# Patient Record
Sex: FEMALE | Race: BLACK OR AFRICAN AMERICAN | Employment: FULL TIME | URBAN - METROPOLITAN AREA
[De-identification: names, ages, dates, MRNs, and addresses within clinical notes are randomized per-mention and may not be internally consistent; named-entity substitution may affect disease eponyms.]

---

## 2017-01-16 ENCOUNTER — OFFICE VISIT (OUTPATIENT)
Dept: INTERNAL MEDICINE CLINIC | Age: 32
End: 2017-01-16

## 2017-01-16 VITALS
HEIGHT: 64 IN | SYSTOLIC BLOOD PRESSURE: 113 MMHG | WEIGHT: 175 LBS | BODY MASS INDEX: 29.88 KG/M2 | HEART RATE: 85 BPM | OXYGEN SATURATION: 97 % | DIASTOLIC BLOOD PRESSURE: 76 MMHG | RESPIRATION RATE: 14 BRPM | TEMPERATURE: 96.9 F

## 2017-01-16 DIAGNOSIS — J30.89 SEASONAL ALLERGIC RHINITIS DUE TO FUNGAL SPORES: ICD-10-CM

## 2017-01-16 DIAGNOSIS — Z23 ENCOUNTER FOR IMMUNIZATION: ICD-10-CM

## 2017-01-16 DIAGNOSIS — I10 ESSENTIAL HYPERTENSION: Primary | ICD-10-CM

## 2017-01-16 DIAGNOSIS — J30.1 SEASONAL ALLERGIC RHINITIS DUE TO POLLEN: ICD-10-CM

## 2017-01-16 RX ORDER — CHOLECALCIFEROL (VITAMIN D3) 125 MCG
1000 TABLET ORAL DAILY
COMMUNITY
Start: 2017-01-16 | End: 2017-09-05 | Stop reason: ALTCHOICE

## 2017-01-16 RX ORDER — FLUTICASONE PROPIONATE 50 MCG
2 SPRAY, SUSPENSION (ML) NASAL DAILY
Qty: 1 BOTTLE | Refills: 5 | Status: SHIPPED | OUTPATIENT
Start: 2017-01-16 | End: 2017-01-16

## 2017-01-16 RX ORDER — FLUTICASONE PROPIONATE 50 MCG
2 SPRAY, SUSPENSION (ML) NASAL DAILY
Qty: 1 BOTTLE | Refills: 5 | Status: SHIPPED | OUTPATIENT
Start: 2017-01-16

## 2017-01-16 RX ORDER — LEVOCETIRIZINE DIHYDROCHLORIDE 5 MG/1
5 TABLET, FILM COATED ORAL DAILY
Qty: 30 TAB | Refills: 5 | Status: SHIPPED | OUTPATIENT
Start: 2017-01-16

## 2017-01-16 NOTE — MR AVS SNAPSHOT
Visit Information Date & Time Provider Department Dept. Phone Encounter #  
 1/16/2017  3:15  [facility] [place], [address] [facility] [phone] [id number] Follow-up Instructions Return in about 4 months (around 5/16/2017) for recheck allergies f/up htn 15 min . Your Appointments 6/7/2017 10:15 AM  
ESTABLISHED PATIENT with Kalyn Bloom MD  
[facility] at [facility]) Appt Note: 6 mo f/up [address] [place] [address]  
[phone] [address] Upcoming Health Maintenance Date Due  
 PAP AKA CERVICAL CYTOLOGY 9/24/2007 INFLUENZA AGE 9 TO ADULT 8/1/2016 DTaP/Tdap/Td series (3 - Td) 5/25/2026 Allergies as of 1/16/2017  Review Complete On: 1/16/2017 By: [address] [facility] [place], MD  
  
 Severity Noted Reaction Type Reactions Other Medication  01/24/2013   Side Effect Hives Allergic, to dust mites, molds Pollen Extracts  05/12/2015    Hives Current Immunizations  Reviewed on 5/31/2016 Name Date Influenza Vaccine 10/15/2015,  Deferred (Patient Refused) Influenza Vaccine Intradermal PF 1/16/2017 Pneumococcal Polysaccharide (PPSV-23) 10/15/2015 TDAP Vaccine 8/20/2011 Tdap 5/25/2016  9:21 PM  
  
 Not reviewed this visit You Were Diagnosed With   
  
 Codes Comments Essential hypertension    -  Primary ICD-10-CM: I10 
ICD-9-CM: 401.9 Vitamin D deficiency     ICD-10-CM: E55.9 ICD-9-CM: 268.9 Encounter for immunization     ICD-10-CM: R36 ICD-9-CM: V03.89 Seasonal allergic rhinitis due to pollen     ICD-10-CM: J30.1 ICD-9-CM: 477.0 Seasonal allergic rhinitis due to fungal spores     ICD-10-CM: J30.89 ICD-9-CM: 477.8 Cardiomyopathy, peripartum, postpartum     ICD-10-CM: O90.3 ICD-9-CM: 674.54 Vitals BP Pulse Temp Resp Height(growth percentile) Weight(growth percentile) 113/76 (BP 1 Location: Left arm, BP Patient Position: At rest) 85 96.9 °F (36.1 °C) (Oral) 14 5' 4\" (1.626 m) 175 lb (79.4 kg) LMP SpO2 BMI OB Status Smoking Status 01/10/2017 97% 30.04 kg/m2 Having regular periods Never Smoker BMI and BSA Data Body Mass Index Body Surface Area 30.04 kg/m 2 1.89 m 2 Preferred Pharmacy Pharmacy Name Phone [facility] [address], [address] [facility] [phone] Your Updated Medication List  
  
   
This list is accurate as of: 17  4:02 PM.  Always use your most recent med list.  
  
  
  
  
 carvedilol 25 mg tablet Commonly known as:  COREG  
two (2) times a day. DIGOX 0.125 mg tablet Generic drug:  digoxin  
daily. ergocalciferol (vitamin D2) 2,000 unit Tab Take 1,000 Units by mouth daily. fluticasone 50 mcg/actuation nasal spray Commonly known as:  Lindalee Goodview 2 Sprays by Both Nostrils route daily. levocetirizine 5 mg tablet Commonly known as:  Delories Isis Take 1 Tab by mouth daily. lisinopril 30 mg tablet Commonly known as:  PRINIVIL, ZESTRIL  
daily. MICROGESTIN 1.5/30 (21) 1.5-30 mg-mcg Tab Generic drug:  norethindrone ac-eth estradiol Take  by mouth daily. Prescriptions Sent to Pharmacy Refills  
 fluticasone (FLONASE) 50 mcg/actuation nasal spray 5 Si Sprays by Both Nostrils route daily. Class: Normal  
 Pharmacy: [facility] [place], [place] [address] RD AT [address] [facility] Ph #: [phone] Route: Both Nostrils  
 levocetirizine (XYZAL) 5 mg tablet 5 Sig: Take 1 Tab by mouth daily. Class: Normal  
 Pharmacy: [facility], [address] [place], [place] [address]E RD AT [address] [facility] Ph #: [phone] Route: Oral  
  
We Performed the Following DIGOXIN [11568 CPT(R)] IMM ADMIN Amaury Packerrini [15818 CPT(R)] METABOLIC PANEL, BASIC [72463 CPT(R)] Follow-up Instructions Return in about 4 months (around 5/16/2017) for recheck allergies f/up htn 15 min . Patient Instructions Call with any concerns Introducing [facility] & HEALTH SERVICES! Kaylacaesar Zavala introduces devsisters patient portal. Now you can access parts of your medical record, email your doctor's office, and request medication refills online. 1. In your internet browser, go to [email or web address]://[email or web address]. [employer] 2. Click on the First Time User? Click Here link in the Sign In box. You will see the New Member Sign Up page. 3. Enter your devsisters Access Code exactly as it appears below. You will not need to use this code after youve completed the sign-up process. If you do not sign up before the expiration date, you must request a new code. · devsisters Access Code: [email or web address] Expires: 3/6/2017  9:45 AM 
 
4. Enter the last four digits of your Social Security Number (xxxx) and Date of Birth (mm/dd/yyyy) as indicated and click Submit. You will be taken to the next sign-up page. 5. Create a devsisters ID. This will be your devsisters login ID and cannot be changed, so think of one that is secure and easy to remember. 6. Create a devsisters password. You can change your password at any time. 7. Enter your Password Reset Question and Answer. This can be used at a later time if you forget your password. 8. Enter your e-mail address. You will receive e-mail notification when new information is available in [address] 9. Click Sign Up. You can now view and download portions of your medical record. 10. Click the Download Summary menu link to download a portable copy of your medical information. If you have questions, please visit the Frequently Asked Questions section of the devsisters website.  Remember, devsisters is NOT to be used for urgent needs. For medical emergencies, dial 911. Now available from your iPhone and Android! Please provide this summary of care documentation to your next provider. Your primary care clinician is listed as Mone Pimentel. If you have any questions after today's visit, please call [phone].

## 2017-01-16 NOTE — PROGRESS NOTES
1. Have you been to the ER, urgent care clinic since your last visit? Hospitalized since your last visit[facility]. 2. Have you seen or consulted any other health care providers outside of the [address] [place] [address] since your last visit? Include any pap smears or colon screening. No     Chief Complaint   Patient presents with    Hypertension     4 month follow up     Not fasting    Patient states would like a flu shot.

## 2017-01-16 NOTE — PROGRESS NOTES
Chief Complaint   Patient presents with    Hypertension     4 month follow up           Subjective:   Des Boyce 28 y.o.  female with a  past medical history reviewed see below. comes in today for f/up HTn using meds as directed and low na diet and minimal cv exercise   Denies any med side effects no cp no sob   ROS: c/o nasal sinus congestion denies fever or chills  otherwise feeling generally well. All other systems reviewed and are negative      Current Outpatient Prescriptions   Medication Sig Dispense Refill    levocetirizine (XYZAL) 5 mg tablet Take 1 Tab by mouth daily. 30 Tab 5    ergocalciferol, vitamin D2, 2,000 unit tab Take 1,000 Units by mouth daily.  fluticasone (FLONASE) 50 mcg/actuation nasal spray 2 Sprays by Both Nostrils route daily. 1 Bottle 5    carvedilol (COREG) 25 mg tablet two (2) times a day.  lisinopril (PRINIVIL, ZESTRIL) 30 mg tablet daily.  DIGOX 125 mcg tablet daily.  norethindrone ac-eth estradiol (MICROGESTIN .5, ,) 1.5-30 mg-mcg tab Take  by mouth daily.        Allergies   Allergen Reactions    Other Medication Hives     Allergic, to dust mites, molds    Pollen Extracts Hives     Past Medical History   Diagnosis Date    AICD (automatic cardioverter/defibrillator) present     Anemia NEC     Cardiomyopathy ([address]     Contact dermatitis and other eczema, due to unspecified cause      hives    Hypertension      Past Surgical History   Procedure Laterality Date    Hx gyn      Hx pacemaker      Hx gyn  13         Hx pacemaker  3/19/15     biotronic icd #[id number]     Family History   Problem Relation Age of Onset    Hypertension Mother     Hypertension Father     Diabetes Maternal Grandmother     Heart Disease Maternal Grandmother     Arthritis-osteo Maternal Grandfather     Asthma Brother     Arthritis-osteo Paternal Grandmother     Arthritis-osteo Paternal Grandfather      Social History   Substance Use Topics    Smoking status: Never Smoker    Smokeless tobacco: Never Used    Alcohol use No          Objective:     Visit Vitals    /76 (BP 1 Location: Left arm, BP Patient Position: At rest)    Pulse 85    Temp 96.9 °F (36.1 °C) (Oral)    Resp 14    Ht 5' 4\" (1.626 m)    Wt 175 lb (79.4 kg)    LMP 01/10/2017    SpO2 97%    BMI 30.04 kg/m2     Gen: NAD, pleasant  HEENT: normal appearing head, nares patent, PERRLA, EOMI, oropharynx no erythema, no cervical lymphadenopathy neck supple   Cardio: RRR nl S1S2 no murmur  Lungs CTAB no wheeze no rales no rhonchi  ABD Soft non tender non distended + bowel sounds  Extremities: full ROM X 4 no clubbing no cyanosis  Neuro: no gross focal deficits noted, alert and orientated X 3  Psych.: well groomed no outward signs of depression. Assessment/Plan:   Jimmie Ramirez was seen today for hypertension. Diagnoses and all orders for this visit:    Essential hypertension  -     DIGOXIN  -     METABOLIC PANEL, BASIC    Encounter for immunization  -     Cancel: Influenza virus vaccine (QUADRIVALENT PRES FREE SYRINGE) IM 3 years and older  -     Influenza virus vaccine (FLUZONE INTRADERMAL PF)    Seasonal allergic rhinitis due to pollen  -     Discontinue: fluticasone (FLONASE) 50 mcg/actuation nasal spray; 2 Sprays by Both Nostrils route daily. Seasonal allergic rhinitis due to fungal spores  -     levocetirizine (XYZAL) 5 mg tablet; Take 1 Tab by mouth daily. Cardiomyopathy, peripartum, postpartum    Other orders  -     fluticasone (FLONASE) 50 mcg/actuation nasal spray; 2 Sprays by Both Nostrils route daily. Follow-up Disposition:  Return in about 4 months (around 5/16/2017) for recheck allergies f/up htn 15 min . Donal Stoner avs printed and given to the pt. [place] gloveross encouraged bp;s excellent dash diet reviewed   The patient voiced understanding of the above. Medication side effects were reviewed with the patient. Call with any concerns.

## 2017-01-18 LAB
BUN SERPL-MCNC: 11 MG/DL (ref 6–20)
BUN/CREAT SERPL: 14 (ref 8–20)
CALCIUM SERPL-MCNC: 8.8 MG/DL (ref 8.7–10.2)
CHLORIDE SERPL-SCNC: 106 MMOL/L (ref 96–106)
CO2 SERPL-SCNC: 20 MMOL/L (ref 18–29)
CREAT SERPL-MCNC: 0.8 MG/DL (ref 0.57–1)
DIGOXIN SERPL-MCNC: 0.6 NG/ML (ref 0.5–0.9)
GLUCOSE SERPL-MCNC: 105 MG/DL (ref 65–99)
POTASSIUM SERPL-SCNC: 4.1 MMOL/L (ref 3.5–5.2)
SODIUM SERPL-SCNC: 142 MMOL/L (ref 134–144)

## 2017-06-07 ENCOUNTER — OFFICE VISIT (OUTPATIENT)
Dept: CARDIOLOGY CLINIC | Age: 32
End: 2017-06-07

## 2017-06-07 VITALS
BODY MASS INDEX: 30.97 KG/M2 | HEIGHT: 64 IN | HEART RATE: 89 BPM | WEIGHT: 181.4 LBS | DIASTOLIC BLOOD PRESSURE: 72 MMHG | SYSTOLIC BLOOD PRESSURE: 110 MMHG | OXYGEN SATURATION: 99 %

## 2017-06-07 DIAGNOSIS — I10 ESSENTIAL HYPERTENSION: Primary | ICD-10-CM

## 2017-06-07 DIAGNOSIS — Z95.810 ICD (IMPLANTABLE CARDIOVERTER-DEFIBRILLATOR) IN PLACE: ICD-10-CM

## 2017-06-07 RX ORDER — NORETHINDRONE ACETATE AND ETHINYL ESTRADIOL, AND FERROUS FUMARATE 1.5-30(21)
KIT ORAL
COMMUNITY
Start: 2017-05-31 | End: 2017-09-05 | Stop reason: ALTCHOICE

## 2017-06-07 NOTE — MR AVS SNAPSHOT
Visit Information Date & Time Provider Department Dept. Phone Encounter #  
 6/7/2017 10:15 AM Jovan Mai MD [facility] at [facility] [phone] Upcoming Health Maintenance Date Due  
 PAP AKA CERVICAL CYTOLOGY 9/24/2007 INFLUENZA AGE 9 TO ADULT 8/1/2017 DTaP/Tdap/Td series (3 - Td) 5/25/2026 Allergies as of 6/7/2017  Review Complete On: 6/7/2017 By: Sharan Pop Severity Noted Reaction Type Reactions Other Medication  01/24/2013   Side Effect Hives Allergic, to dust mites, molds Pollen Extracts  05/12/2015    Hives Current Immunizations  Reviewed on 5/31/2016 Name Date Influenza Vaccine 10/15/2015,  Deferred (Patient Refused) Influenza Vaccine Intradermal PF 1/16/2017 Pneumococcal Polysaccharide (PPSV-23) 10/15/2015 TDAP Vaccine 8/20/2011 Tdap 5/25/2016  9:21 PM  
  
 Not reviewed this visit You Were Diagnosed With   
  
 Codes Comments Essential hypertension    -  Primary ICD-10-CM: I10 
ICD-9-CM: 401.9 Cardiomyopathy, peripartum, postpartum     ICD-10-CM: O90.3 ICD-9-CM: 674.54   
 ICD (implantable cardioverter-defibrillator) in place     ICD-10-CM: Z95.810 ICD-9-CM: V45.02 Vitals BP Pulse Height(growth percentile) Weight(growth percentile) SpO2 BMI  
 110/72 89 5' 4\" (1.626 m) 181 lb 6.4 oz (82.3 kg) 99% 31.14 kg/m2 OB Status Smoking Status Having regular periods Never Smoker Vitals History BMI and BSA Data Body Mass Index Body Surface Area  
 31.14 kg/m 2 1.93 m 2 Preferred Pharmacy Pharmacy Name Phone [facility] [facility] [phone] Your Updated Medication List  
  
   
This list is accurate as of: 6/7/17 11:25 AM.  Always use your most recent med list.  
  
  
  
  
 carvedilol 25 mg tablet Commonly known as:  Patricia Stable  
 Take 12.5 mg by mouth two (2) times a day. ergocalciferol (vitamin D2) 2,000 unit Tab Take 1,000 Units by mouth daily. fluticasone 50 mcg/actuation nasal spray Commonly known as:  Mobley Mya 2 Sprays by Both Nostrils route daily. JUNEL FE 1.5/30 (28) 1.5 mg-30 mcg (21)/75 mg (7) Tab Generic drug:  norethindrone-ethinyl estradiol-iron  
  
 levocetirizine 5 mg tablet Commonly known as:  Gertrudis Round Take 1 Tab by mouth daily. MICROGESTIN 1.5/30 (21) 1.5-30 mg-mcg Tab Generic drug:  norethindrone ac-eth estradiol Take  by mouth daily. We Performed the Following AMB POC EKG ROUTINE W/ 12 LEADS, INTER & REP [76117 CPT(R)] To-Do List   
 09/07/2017 ECHO:  2D ECHO COMPLETE ADULT (TTE) W OR WO CONTR Patient Instructions   
-- We will repeat the echo in September 
-- For now, decrease your Coreg dose to 12.5 mg by mouth twice a day for two weeks -- call the office with your home blood pressure readings, if they are good: 
-- Then decrease it again to 6.25 mg by mouth twice a day for two weeks -- Call the office again with your home blood pressure readings,  
-- After that, if your blood pressure remains in a good range, you can discontinue it altogether Home Blood Pressure Test: About This Test 
What is it? A home blood pressure test allows you to keep track of your blood pressure at home. Blood pressure is a measure of the force of blood against the walls of your arteries. Blood pressure readings include two numbers, such as 130/80 (say \"130 over 80\"). The first number is the systolic pressure. The second number is the diastolic pressure. Why is this test done? You may do this test at home to: · Find out if you have high blood pressure. · Track your blood pressure if you have high blood pressure. · Track how well medicine is working to reduce high blood pressure.  
· Check how lifestyle changes, such as weight loss and exercise, are affecting blood pressure. How can you prepare for the test? 
· Do not use caffeine, tobacco, or medicines known to raise blood pressure (such as nasal decongestant sprays) for at least 30 minutes before taking your blood pressure. · Do not exercise for at least 30 minutes before taking your blood pressure. What happens before the test? 
Take your blood pressure while you feel comfortable and relaxed. Sit quietly with both feet on the floor for at least 5 minutes before the test. 
What happens during the test? 
· Sit with your arm slightly bent and resting on a table so that your upper arm is at the same level as your heart. · Roll up your sleeve or take off your shirt to expose your upper arm. · Wrap the blood pressure cuff around your upper arm so that the lower edge of the cuff is about 1 inch above the bend of your elbow. Proceed with the following steps depending on if you are using an automatic or manual pressure monitor. Automatic blood pressure monitors · Press the on/off button on the automatic monitor and wait until the ready-to-measure \"heart\" symbol appears next to zero in the display window. · Press the start button. The cuff will inflate and deflate by itself. · Your blood pressure numbers will appear on the screen. · Write your numbers in your log book, along with the date and time. Manual blood pressure monitors · Place the earpieces of a stethoscope in your ears, and place the bell of the stethoscope over the artery, just below the cuff. · Close the valve on the rubber inflating bulb. · Squeeze the bulb rapidly with your opposite hand to inflate the cuff until the dial or column of mercury reads about 30 mm Hg higher than your usual systolic pressure. If you do not know your usual pressure, inflate the cuff to 210 mm Hg or until the pulse at your wrist disappears. · Open the pressure valve just slightly by twisting or pressing the valve on the bulb. · As you watch the pressure slowly fall, note the level on the dial at which you first start to hear a pulsing or tapping sound through the stethoscope. This is your systolic blood pressure. · Continue letting the air out slowly. The sounds will become muffled and will finally disappear. Note the pressure when the sounds completely disappear. This is your diastolic blood pressure. Let out all the remaining air. · Write your numbers in your log book, along with the date and time. What else should you know about the test? 
Results for adults ages 25 and older (mm Hg): · Normal (ideal): Systolic 729 or below. Diastolic 79 or below. · Prehypertension: Systolic 528 to 099. Diastolic 80 to 89. · Hypertension: Systolic 346 or above. Diastolic 90 or above. Follow-up care is a key part of your treatment and safety. Be sure to make and go to all appointments, and call your doctor if you are having problems. It's also a good idea to keep a list of the medicines you take. Where can you learn more? Go to http://desiree-kinga.info/. Enter C427 in the search box to learn more about \"Home Blood Pressure Test: About This Test.\" Current as of: January 27, 2016 Content Version: 11.2 © 2622-0284 [employer]. Care instructions adapted under license by [employer] (which disclaims liability or warranty for this information). If you have questions about a medical condition or this instruction, always ask your healthcare professional. [place] [address] any warranty or liability for your use of this information. Introducing [facility] & HEALTH SERVICES! Saúl Velasquez introduces [employer] patient portal. Now you can access parts of your medical record, email your doctor's office, and request medication refills online. 1. In your internet browser, go to [email or web address]. [employer]. [employer] 2. Click on the First Time User? Click Here link in the Sign In box.  You will see the New Member Sign Up page. 3. Enter your [employer] Access Code exactly as it appears below. You will not need to use this code after youve completed the sign-up process. If you do not sign up before the expiration date, you must request a new code. · [employer] Access Code: [email or web address] Expires: 9/5/2017 11:21 AM 
 
4. Enter the last four digits of your Social Security Number (xxxx) and Date of Birth (mm/dd/yyyy) as indicated and click Submit. You will be taken to the next sign-up page. 5. Create a [employer] ID. This will be your [employer] login ID and cannot be changed, so think of one that is secure and easy to remember. 6. Create a [employer] password. You can change your password at any time. 7. Enter your Password Reset Question and Answer. This can be used at a later time if you forget your password. 8. Enter your e-mail address. You will receive e-mail notification when new information is available in [address] 9. Click Sign Up. You can now view and download portions of your medical record. 10. Click the Download Summary menu link to download a portable copy of your medical information. If you have questions, please visit the Frequently Asked Questions section of the [employer] website. Remember, [employer] is NOT to be used for urgent needs. For medical emergencies, dial 911. Now available from your iPhone and Android! Please provide this summary of care documentation to your next provider. Your primary care clinician is listed as Trisha Sumner. If you have any questions after today's visit, please call [phone].

## 2017-06-07 NOTE — PROGRESS NOTES
[facility]   [address] [facility], [address] [place] [address]                                          NEW PATIENT HPI/FOLLOW-UP      NAME:  Emil Skelton   :   1985   MRN:   [id number]   PCP:  Iris Silva MD           Subjective: The patient is a 28y.o. year old female with a history of  who returns for a routine follow-up. Since the last visit, patient reports stopping her lisinopril and digoxin, on her own several weeks ago. Possibly April. She is not regularly check HR and BP at home. She is taking Coreg, but only daily rather than BID. She is not planning any pregnancies at this time. She reports no new symptoms. Denies change in exercise tolerance, chest pain, edema, medication intolerance, palpitations, shortness of breath, PND/orthopnea wheezing, sputum, syncope, dizziness or light headedness. Doing satisfactorily. Review of Systems  General: Pt denies excessive weight gain or loss. Pt is able to conduct ADL's. Respiratory: Denies shortness of breath, MOULTON, wheezing or stridor.   Cardiovascular: Denies precordial pain, palpitations, edema or PND  Gastrointestinal: Denies poor appetite, indigestion, abdominal pain or blood in stool  Peripheral vascular: Denies claudication, leg cramps  Neuropsychiatric: Denies paresthesias,tingling,numbness,anxiety,depression,fatigue  Musculoskeletal: Denies pain,tenderness, soreness,swelling      Past Medical History:   Diagnosis Date    AICD (automatic cardioverter/defibrillator) present     Anemia NEC     Cardiomyopathy ([facility] [address]     Contact dermatitis and other eczema, due to unspecified cause     hives    Hypertension      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    Vitamin D deficiency 2011    Snoring 2011      Past Surgical History: Procedure Laterality Date    HX GYN      HX GYN  13        HX PACEMAKER      HX PACEMAKER  3/19/15    biotronic icd #[id number]     Allergies   Allergen Reactions    Other Medication Hives     Allergic, to dust mites, molds    Pollen Extracts Hives      Family History   Problem Relation Age of Onset    Hypertension Mother     Hypertension Father     Diabetes Maternal Grandmother     Heart Disease Maternal Grandmother     Arthritis-osteo Maternal Grandfather     Asthma Brother     Arthritis-osteo Paternal Grandmother     Arthritis-osteo Paternal Grandfather       Social History     Social History    Marital status:      Spouse name: N/A    Number of children: 0    Years of education: N/A     Occupational History    FDC analyist      Unknown     Social History Main Topics    Smoking status: Never Smoker    Smokeless tobacco: Never Used    Alcohol use No    Drug use: No      Comment: denies     Sexual activity: Yes     Partners: Male     Birth control/ protection: Pill     Other Topics Concern    Not on file     Social History Narrative    ** Merged History Encounter **           Current Outpatient Prescriptions   Medication Sig    JUNEL FE 1., , 1.5 mg-30 mcg (21)/75 mg (7) tab     levocetirizine (XYZAL) 5 mg tablet Take 1 Tab by mouth daily.  ergocalciferol, vitamin D2, 2,000 unit tab Take 1,000 Units by mouth daily.  fluticasone (FLONASE) 50 mcg/actuation nasal spray 2 Sprays by Both Nostrils route daily.  carvedilol (COREG) 25 mg tablet two (2) times a day.  lisinopril (PRINIVIL, ZESTRIL) 30 mg tablet daily.  DIGOX 125 mcg tablet daily.  norethindrone ac-eth estradiol (MICROGESTIN ,) 1.5-30 mg-mcg tab Take  by mouth daily. No current facility-administered medications for this visit.          I have reviewed the MAs notes, vitals, problem list, allergy list, medical history, family medical, social history and medications. Objective:     Physical Exam:     Vitals:    06/07/17 1048   BP: 110/72   Pulse: 89   SpO2: 99%   Weight: 181 lb 6.4 oz (82.3 kg)   Height: 5' 4\" (1.626 m)    Body mass index is 31.14 kg/(m^2). General: WDWN adult female, in no acute distress. Reserved affect. HEENT: No carotid bruits, no JVD, trach is midline. Heart:  Normal S1/S2 negative S3 or S4. Regular, no murmur, gallop or rub.   Respiratory: Clear bilaterally, no wheezing or rales  Abdomen:   Soft, non-tender, bowel sounds are active.   Extremities:  No edema, normal cap refill, no cyanosis. Neuro: A&Ox3, speech clear, gait stable. Skin: Skin color is normal. No rashes or lesions. No diaphoresis.   Vascular: 2+ pulses symmetric in all extremities        Data Review:       Cardiographics:    EKG: NSR    Cardiology Labs:    Results for orders placed or performed during the hospital encounter of 09/16/15   EKG, 12 LEAD, INITIAL   Result Value Ref Range    Ventricular Rate 73 BPM    Atrial Rate 73 BPM    P-R Interval 138 ms    QRS Duration 82 ms    Q-T Interval 448 ms    QTC Calculation (Bezet) 493 ms    Calculated P Axis 51 degrees    Calculated R Axis 44 degrees    Calculated T Axis 2 degrees    Diagnosis       Normal sinus rhythm  Normal ekg  Confirmed by Carol Pruitt ([id number]) on 9/17/2015 7:29:30 AM         Lab Results   Component Value Date/Time    Cholesterol, total 139 06/02/2016 08:19 AM    HDL Cholesterol 45 06/02/2016 08:19 AM    LDL, calculated 76 06/02/2016 08:19 AM    Triglyceride 89 06/02/2016 08:19 AM       Lab Results   Component Value Date/Time    Sodium 142 01/17/2017 02:05 PM    Potassium 4.1 01/17/2017 02:05 PM    Chloride 106 01/17/2017 02:05 PM    CO2 20 01/17/2017 02:05 PM    Anion gap 9 09/16/2015 08:29 PM    Glucose 105 01/17/2017 02:05 PM    BUN 11 01/17/2017 02:05 PM    Creatinine 0.80 01/17/2017 02:05 PM    BUN/Creatinine ratio 14 01/17/2017 02:05 PM    GFR est  01/17/2017 02:05 PM    GFR est non-AA 99 01/17/2017 02:05 PM    Calcium 8.8 01/17/2017 02:05 PM    Bilirubin, total 0.3 09/16/2015 08:29 PM    AST (SGOT) 15 09/16/2015 08:29 PM    Alk. phosphatase 57 09/16/2015 08:29 PM    Protein, total 7.9 09/16/2015 08:29 PM    Albumin 3.5 09/16/2015 08:29 PM    Globulin 4.4 09/16/2015 08:29 PM    A-G Ratio 0.8 09/16/2015 08:29 PM    ALT (SGPT) 18 09/16/2015 08:29 PM          Assessment:       ICD-10-CM ICD-9-CM    1. Essential hypertension I10 401.9 AMB POC EKG ROUTINE W/ 12 LEADS, INTER & REP      2D ECHO COMPLETE ADULT (TTE) W OR WO CONTR      CANCELED: 2D ECHO COMPLETE ADULT (TTE) W OR WO CONTR   2. Cardiomyopathy, peripartum, postpartum O90.3 674.54 2D ECHO COMPLETE ADULT (TTE) W OR WO CONTR      CANCELED: 2D ECHO COMPLETE ADULT (TTE) W OR WO CONTR   3. ICD (implantable cardioverter-defibrillator) in place Z95.810 V45.02 2D ECHO COMPLETE ADULT (TTE) W OR WO CONTR      CANCELED: 2D ECHO COMPLETE ADULT (TTE) W OR WO CONTR         Discussion: Patient presents at this time stable from a cardiac perspective. Pleased with present status. Plan:     1. -- ECHO in 1 month   -- Decrease Coreg to 12.5 mg BID now   -- Call office with home BP readings, if BP controlled   -- Then decrease again to 6.25 mg BID in 2 weeks, if BP controlled   -- Then discontinue Coreg 1 month from now    2. Encouraged to exercise to tolerance, lose weight, stop smoking and follow low fat, low cholesterol, low sodium predominantly Plant-based (consider Mediterranean) diet. 3.Follow up: 6 months   -- Call with questions or concerns. Will follow up any test results by phone and/or f/u here in office if needed. .      I have discussed the diagnosis with the patient and the intended plan as seen in the above orders. The patient has received an after-visit summary and questions were answered concerning future plans. I have discussed any concerning medication side effects and warnings with the patient as well. Patient seen and examined. All pertinent data reviewed. I have reviewed detailed note as outlined by Vladimir Ying. Case discussed with Nursing/medical assistant staff and Vladimir Ying. Patient returns doing well. Has on her own stopped all meds in 4/17 for PPCM except Coreg 25 mg every day sometimes. Has been taking BP's at grocery store and <140/90. Would like to stop all meds but cautioned should be done slowly. As off all meds and VS's stable,continue to wean off. Repeat echo within next month. Call if SOB,leg swelling,etc. Plans as outlined.       Ember Lange PA-C  6/7/2017     ADELFO Villa

## 2017-06-07 NOTE — PATIENT INSTRUCTIONS
-- We will repeat the echo in July  -- For now, decrease your Coreg dose to 12.5 mg by mouth twice a day for two weeks  -- call the office with your home blood pressure readings, if they are good:  -- Then decrease it again to 6.25 mg by mouth twice a day for two weeks  -- Call the office again with your home blood pressure readings,   -- After that, if your blood pressure remains in a good range, you can discontinue it altogether          Home Blood Pressure Test: About This Test  What is it? A home blood pressure test allows you to keep track of your blood pressure at home. Blood pressure is a measure of the force of blood against the walls of your arteries. Blood pressure readings include two numbers, such as 130/80 (say \"130 over 80\"). The first number is the systolic pressure. The second number is the diastolic pressure. Why is this test done? You may do this test at home to:  · Find out if you have high blood pressure. · Track your blood pressure if you have high blood pressure. · Track how well medicine is working to reduce high blood pressure. · Check how lifestyle changes, such as weight loss and exercise, are affecting blood pressure. How can you prepare for the test?  · Do not use caffeine, tobacco, or medicines known to raise blood pressure (such as nasal decongestant sprays) for at least 30 minutes before taking your blood pressure. · Do not exercise for at least 30 minutes before taking your blood pressure. What happens before the test?  Take your blood pressure while you feel comfortable and relaxed. Sit quietly with both feet on the floor for at least 5 minutes before the test.  What happens during the test?  · Sit with your arm slightly bent and resting on a table so that your upper arm is at the same level as your heart. · Roll up your sleeve or take off your shirt to expose your upper arm.   · Wrap the blood pressure cuff around your upper arm so that the lower edge of the cuff is about 1 inch above the bend of your elbow. Proceed with the following steps depending on if you are using an automatic or manual pressure monitor. Automatic blood pressure monitors  · Press the on/off button on the automatic monitor and wait until the ready-to-measure \"heart\" symbol appears next to zero in the display window. · Press the start button. The cuff will inflate and deflate by itself. · Your blood pressure numbers will appear on the screen. · Write your numbers in your log book, along with the date and time. Manual blood pressure monitors  · Place the earpieces of a stethoscope in your ears, and place the bell of the stethoscope over the artery, just below the cuff. · Close the valve on the rubber inflating bulb. · Squeeze the bulb rapidly with your opposite hand to inflate the cuff until the dial or column of mercury reads about 30 mm Hg higher than your usual systolic pressure. If you do not know your usual pressure, inflate the cuff to 210 mm Hg or until the pulse at your wrist disappears. · Open the pressure valve just slightly by twisting or pressing the valve on the bulb. · As you watch the pressure slowly fall, note the level on the dial at which you first start to hear a pulsing or tapping sound through the stethoscope. This is your systolic blood pressure. · Continue letting the air out slowly. The sounds will become muffled and will finally disappear. Note the pressure when the sounds completely disappear. This is your diastolic blood pressure. Let out all the remaining air. · Write your numbers in your log book, along with the date and time. What else should you know about the test?  Results for adults ages 25 and older (mm Hg):  · Normal (ideal): Systolic 884 or below. Diastolic 79 or below. · Prehypertension: Systolic 090 to 401. Diastolic 80 to 89. · Hypertension: Systolic 115 or above. Diastolic 90 or above. Follow-up care is a key part of your treatment and safety.  Be sure to make and go to all appointments, and call your doctor if you are having problems. It's also a good idea to keep a list of the medicines you take. Where can you learn more? Go to http://desiree-kinga.info/. Enter C427 in the search box to learn more about \"Home Blood Pressure Test: About This Test.\"  Current as of: January 27, 2016  Content Version: 11.2  © 6835-1783 [employer], [employer]. Care instructions adapted under license by [facility] (which disclaims liability or warranty for this information). If you have questions about a medical condition or this instruction, always ask your healthcare professional. [address] any warranty or liability for your use of this information.

## 2017-07-10 ENCOUNTER — HOSPITAL ENCOUNTER (OUTPATIENT)
Dept: NON INVASIVE DIAGNOSTICS | Age: 32
Discharge: HOME | End: 2017-07-10
Attending: PHYSICIAN ASSISTANT
Payer: COMMERCIAL

## 2017-07-10 DIAGNOSIS — Z95.810 ICD (IMPLANTABLE CARDIOVERTER-DEFIBRILLATOR) IN PLACE: ICD-10-CM

## 2017-07-10 DIAGNOSIS — I10 ESSENTIAL HYPERTENSION: ICD-10-CM

## 2017-07-10 PROCEDURE — 93306 TTE W/DOPPLER COMPLETE: CPT

## 2017-07-13 ENCOUNTER — TELEPHONE (OUTPATIENT)
Dept: CARDIOLOGY CLINIC | Age: 32
End: 2017-07-13

## 2017-07-13 NOTE — TELEPHONE ENCOUNTER
Outgoing call to patient per. [facility] JON STOKES PA-C, Dr. Regarding Coreg dose and BP readings. Patient was asked if she's still taking the Coreg and if she was taking her blood pressure as instructed from last office visit? Patient stated \"she's currently taking 25 mg of Coreg, and that she hasn't been checking her blood pressure. \" Patient is scheduled to follow up with Subhash Oseguera, to discuss Echo, results. on 7/17/17 at 10:00 am. Patient verbalized understanding.

## 2017-07-14 ENCOUNTER — OFFICE VISIT (OUTPATIENT)
Dept: CARDIOLOGY CLINIC | Age: 32
End: 2017-07-14

## 2017-07-14 VITALS
SYSTOLIC BLOOD PRESSURE: 119 MMHG | HEIGHT: 64 IN | DIASTOLIC BLOOD PRESSURE: 75 MMHG | WEIGHT: 185.8 LBS | HEART RATE: 99 BPM | OXYGEN SATURATION: 100 % | BODY MASS INDEX: 31.72 KG/M2

## 2017-07-14 DIAGNOSIS — R06.83 SNORING: ICD-10-CM

## 2017-07-14 DIAGNOSIS — I10 ESSENTIAL HYPERTENSION: ICD-10-CM

## 2017-07-14 DIAGNOSIS — I50.22 SYSTOLIC CHF, CHRONIC (HCC): ICD-10-CM

## 2017-07-14 DIAGNOSIS — Z95.810 ICD (IMPLANTABLE CARDIOVERTER-DEFIBRILLATOR) IN PLACE: ICD-10-CM

## 2017-07-14 RX ORDER — VALSARTAN 40 MG/1
40 TABLET ORAL DAILY
Qty: 30 TAB | Refills: 6 | Status: SHIPPED | OUTPATIENT
Start: 2017-07-14 | End: 2018-01-08 | Stop reason: SDUPTHER

## 2017-07-14 RX ORDER — CHLORTHALIDONE 25 MG/1
12.5 TABLET ORAL DAILY
Qty: 30 TAB | Refills: 6 | Status: SHIPPED | OUTPATIENT
Start: 2017-07-14 | End: 2018-01-31

## 2017-07-14 NOTE — MR AVS SNAPSHOT
Visit Information Date & Time Provider Department Dept. Phone Encounter #  
 7/14/2017 10:00 AM Andria Kocher, MD [facility] at [place] [address] [id number] Your Appointments 7/25/2017 11:15 AM  
ESTABLISHED PATIENT with Andria Kocher, MD [facility] at [facility]) Appt Note: 2 WEEKS F/U; 2 WEEKS F/U  
 [address]  
[phone] [address] Upcoming Health Maintenance Date Due  
 PAP AKA CERVICAL CYTOLOGY 9/24/2007 INFLUENZA AGE 9 TO ADULT 8/1/2017 DTaP/Tdap/Td series (3 - Td) 5/25/2026 Allergies as of 7/14/2017  Review Complete On: 6/8/2017 By: Andria Kocher, MD  
  
 Severity Noted Reaction Type Reactions Other Medication  01/24/2013   Side Effect Hives Allergic, to dust mites, molds Pollen Extracts  05/12/2015    Hives Current Immunizations  Reviewed on 5/31/2016 Name Date Influenza Vaccine 10/15/2015,  Deferred (Patient Refused) Influenza Vaccine Intradermal PF 1/16/2017 Pneumococcal Polysaccharide (PPSV-23) 10/15/2015 TDAP Vaccine 8/20/2011 Tdap 5/25/2016  9:21 PM  
  
 Not reviewed this visit You Were Diagnosed With   
  
 Codes Comments Cardiomyopathy, peripartum, postpartum    -  Primary ICD-10-CM: O90.3 ICD-9-CM: 674.54 Essential hypertension     ICD-10-CM: I10 
ICD-9-CM: 401.9 ICD (implantable cardioverter-defibrillator) in place     ICD-10-CM: Z95.810 ICD-9-CM: V45.02 Snoring     ICD-10-CM: R06.83 
ICD-9-CM: 786.09 Systolic CHF, chronic (HCC)     ICD-10-CM: I50.22 ICD-9-CM: 428.22, 428.0 EF 40-45% Vitals BP Pulse Height(growth percentile) Weight(growth percentile) SpO2 BMI  
 119/75 99 5' 4\" (1.626 m) 185 lb 12.8 oz (84.3 kg) 100% 31.89 kg/m2 OB Status Smoking Status Having regular periods Never Smoker Vitals History BMI and BSA Data Body Mass Index Body Surface Area  
 31.89 kg/m 2 1.95 m 2 Preferred Pharmacy Pharmacy Name Phone [facility], [address] [facility] [phone] Your Updated Medication List  
  
   
This list is accurate as of: 7/14/17 11:29 AM.  Always use your most recent med list.  
  
  
  
  
 carvedilol 25 mg tablet Commonly known as:  Yuan Rider Take 12.5 mg by mouth two (2) times a day. chlorthalidone 25 mg tablet Commonly known as:  Hermina Iva Take 0.5 Tabs by mouth daily. Indications: PERIPHERAL EDEMA DUE TO CHRONIC HEART FAILURE  
  
 ergocalciferol (vitamin D2) 2,000 unit Tab Take 1,000 Units by mouth daily. fluticasone 50 mcg/actuation nasal spray Commonly known as:  Laisha Hurtado 2 Sprays by Both Nostrils route daily. JUNEL FE 1.5/30 (28) 1.5 mg-30 mcg (21)/75 mg (7) Tab Generic drug:  norethindrone-ethinyl estradiol-iron  
  
 levocetirizine 5 mg tablet Commonly known as:  Marval Kitten Take 1 Tab by mouth daily. valsartan 40 mg tablet Commonly known as:  DIOVAN Take 1 Tab by mouth daily. Indications: Chronic Heart Failure Prescriptions Sent to Pharmacy Refills  
 valsartan (DIOVAN) 40 mg tablet 6 Sig: Take 1 Tab by mouth daily. Indications: Chronic Heart Failure Class: Normal  
 Pharmacy: [facility], [address] AT [address] [place] [address] Ph #: [phone] Route: Oral  
 chlorthalidone (HYGROTEN) 25 mg tablet 6 Sig: Take 0.5 Tabs by mouth daily. Indications: PERIPHERAL EDEMA DUE TO CHRONIC HEART FAILURE Class: Normal  
 Pharmacy: [facility], [address] AT [address] [place] [address] Ph #: [phone] Route: Oral  
  
We Performed the Following REFERRAL TO CARDIAC ELECTROPHYSIOLOGY [REF11 Custom] Comments:  
 Please evaluate patient's ICD; pacemaker clinic Referral Information Referral ID Referred By Referred To  
  
 [id number] Tony Cooper MD   
   [id number] [facility] [place], [address] Phone: [phone] Fax: [phone] Visits Status Start Date End Date 1 New Request 7/14/17 7/14/18 If your referral has a status of pending review or denied, additional information will be sent to support the outcome of this decision. Introducing [facility]! Jj Franklin introduces [employer] patient portal. Now you can access parts of your medical record, email your doctor's office, and request medication refills online. 1. In your internet browser, go to [email or web address] 2. Click on the First Time User? Click Here link in the Sign In box. You will see the New Member Sign Up page. 3. Enter your [employer] Access Code exactly as it appears below. You will not need to use this code after youve completed the sign-up process. If you do not sign up before the expiration date, you must request a new code. · [employer] Access Code: [email or web address] Expires: 9/5/2017 11:21 AM 
 
4. Enter the last four digits of your Social Security Number (xxxx) and Date of Birth (mm/dd/yyyy) as indicated and click Submit. You will be taken to the next sign-up page. 5. Create a Misticomt ID. This will be your [employer] login ID and cannot be changed, so think of one that is secure and easy to remember. 6. Create a [employer] password. You can change your password at any time. 7. Enter your Password Reset Question and Answer. This can be used at a later time if you forget your password. 8. Enter your e-mail address. You will receive e-mail notification when new information is available in [address] 9. Click Sign Up. You can now view and download portions of your medical record. 10. Click the Download Summary menu link to download a portable copy of your medical information. If you have questions, please visit the Frequently Asked Questions section of the [employer] website. Remember, [facility] is NOT to be used for urgent needs. For medical emergencies, dial 911. Now available from your iPhone and Android! Please provide this summary of care documentation to your next provider. Your primary care clinician is listed as Kristin Crisostomo. If you have any questions after today's visit, please call [phone].

## 2017-07-14 NOTE — PROGRESS NOTES
[facility]   [address] [facility], [address] [place] [address]                                          NEW PATIENT HPI/FOLLOW-UP      NAME:  Tracy Roque   :   1985   MRN:   [id number]   PCP:  Mauro Arellano MD           Subjective: The patient is a 28y.o. year old female h/o iban/postpartum cardiomyopathy, s/p ICD and HTN who returns for a routine follow-up. Since the last visit, patient had updated echo and pt was asked to follow up for results. Pt reports no new symptoms, but when pressed states that she is often fatigued, dyspneic after a flight of stairs, has gained weight recently and cannot lay flat to sleep d/t SOB. Records show a 4 lb weight gain since last visit 1 month ago. She has not been taking home BP readings. She notes she is still on Coreg 12.5mg BID. Denies  chest pain, edema, medication intolerance, palpitations, wheezing, sputum, syncope, dizziness or light headedness. Doing satisfactorily. Review of Systems  General: +weight gain. Pt is able to conduct ADL's. Respiratory: +shortness of breath, MOULTON, Denies wheezing or stridor.   Cardiovascular: Denies precordial pain, palpitations, edema or PND  Gastrointestinal: Denies poor appetite, indigestion, abdominal pain or blood in stool  Peripheral vascular: Denies claudication, leg cramps  Neuropsychiatric: +fatigue Denies paresthesias,tingling,numbness,anxiety,depression,  Musculoskeletal: Denies pain,tenderness, soreness,swelling      Past Medical History:   Diagnosis Date    AICD (automatic cardioverter/defibrillator) present     Anemia NEC     Cardiomyopathy ([facility] [address]     Contact dermatitis and other eczema, due to unspecified cause     hives    Hypertension      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    Vitamin D deficiency 2011    Snoring 2011      Past Surgical History:   Procedure Laterality Date    HX GYN      HX GYN  13        HX PACEMAKER      HX PACEMAKER  3/19/15    biotronic icd #[id number]     Allergies   Allergen Reactions    Other Medication Hives     Allergic, to dust mites, molds    Pollen Extracts Hives      Family History   Problem Relation Age of Onset    Hypertension Mother     Hypertension Father     Diabetes Maternal Grandmother     Heart Disease Maternal Grandmother     Arthritis-osteo Maternal Grandfather     Asthma Brother     Arthritis-osteo Paternal Grandmother     Arthritis-osteo Paternal Grandfather       Social History     Social History    Marital status:      Spouse name: N/A    Number of children: 0    Years of education: N/A     Occupational History    alf analyist      Unknown     Social History Main Topics    Smoking status: Never Smoker    Smokeless tobacco: Never Used    Alcohol use No    Drug use: No      Comment: denies     Sexual activity: Yes     Partners: Male     Birth control/ protection: Pill     Other Topics Concern    Not on file     Social History Narrative    ** Merged History Encounter **           Current Outpatient Prescriptions   Medication Sig    valsartan (DIOVAN) 40 mg tablet Take 1 Tab by mouth daily. Indications: Chronic Heart Failure    chlorthalidone (HYGROTEN) 25 mg tablet Take 0.5 Tabs by mouth daily. Indications: PERIPHERAL EDEMA DUE TO CHRONIC HEART FAILURE    JUNEL FE 1.5, , 1.5 mg-30 mcg (21)/75 mg (7) tab     levocetirizine (XYZAL) 5 mg tablet Take 1 Tab by mouth daily.  ergocalciferol, vitamin D2, 2,000 unit tab Take 1,000 Units by mouth daily.  fluticasone (FLONASE) 50 mcg/actuation nasal spray 2 Sprays by Both Nostrils route daily.  carvedilol (COREG) 25 mg tablet Take 12.5 mg by mouth two (2) times a day. No current facility-administered medications for this visit. I have reviewed the MAs notes, vitals, problem list, allergy list, medical history, family medical, social history and medications. Objective:     Physical Exam:     Vitals:    07/14/17 1029   BP: 119/75   Pulse: 99   SpO2: 100%   Weight: 185 lb 12.8 oz (84.3 kg)   Height: 5' 4\" (1.626 m)    Body mass index is 31.89 kg/(m^2). Vitals 7/14/2017 6/7/2017 1/16/2017 12/6/2016 9/27/2016   Weight 185 lb 12.8 oz 181 lb 6.4 oz 175 lb 176 lb 12.8 oz      Vitals 9/27/2016 9/13/2016 8/18/2016 8/18/2016 6/6/2016   Weight 178 lb 12.8 oz 179 lb 1.6 oz  183 lb 14.4 oz 175 lb 8 oz     Vitals 5/31/2016 5/25/2016 4/12/2016 4/12/2016 4/12/2016   Weight 176 lb 8 oz 170 lb            General: WDWN , adult female in no acute distress. Full affect. HEENT: No carotid bruits, no JVD, trach is midline. Heart:  Normal S1/S2 negative S3 or S4. Regular, no murmur, gallop or rub.   Respiratory: Clear bilaterally, no wheezing or rales  Abdomen:   Soft, non-tender, bowel sounds are active.   Extremities:  No edema, normal cap refill, no cyanosis. Neuro: A&Ox3, speech clear, gait stable. Skin: Skin color is normal. No rashes or lesions. No diaphoresis.   Vascular: 2+ pulses symmetric in all extremities      Data Review:       Cardiographics:    EKG: None today    Cardiology Labs:    Results for orders placed or performed during the hospital encounter of 09/16/15   EKG, 12 LEAD, INITIAL   Result Value Ref Range    Ventricular Rate 73 BPM    Atrial Rate 73 BPM    P-R Interval 138 ms    QRS Duration 82 ms    Q-T Interval 448 ms    QTC Calculation (Bezet) 493 ms    Calculated P Axis 51 degrees    Calculated R Axis 44 degrees    Calculated T Axis 2 degrees    Diagnosis       Normal sinus rhythm  Normal ekg  Confirmed by Sirena Conway ([id number]) on 9/17/2015 7:29:30 AM         Lab Results   Component Value Date/Time    Cholesterol, total 139 06/02/2016 08:19 AM    HDL Cholesterol 45 06/02/2016 08:19 AM    LDL, calculated 76 06/02/2016 08:19 AM    Triglyceride 89 06/02/2016 08:19 AM       Lab Results   Component Value Date/Time    Sodium 142 01/17/2017 02:05 PM    Potassium 4.1 01/17/2017 02:05 PM    Chloride 106 01/17/2017 02:05 PM    CO2 20 01/17/2017 02:05 PM    Anion gap 9 09/16/2015 08:29 PM    Glucose 105 01/17/2017 02:05 PM    BUN 11 01/17/2017 02:05 PM    Creatinine 0.80 01/17/2017 02:05 PM    BUN/Creatinine ratio 14 01/17/2017 02:05 PM    GFR est  01/17/2017 02:05 PM    GFR est non-AA 99 01/17/2017 02:05 PM    Calcium 8.8 01/17/2017 02:05 PM    Bilirubin, total 0.3 09/16/2015 08:29 PM    AST (SGOT) 15 09/16/2015 08:29 PM    Alk. phosphatase 57 09/16/2015 08:29 PM    Protein, total 7.9 09/16/2015 08:29 PM    Albumin 3.5 09/16/2015 08:29 PM    Globulin 4.4 09/16/2015 08:29 PM    A-G Ratio 0.8 09/16/2015 08:29 PM    ALT (SGPT) 18 09/16/2015 08:29 PM      ECHO    IMPRESSIONS:  Very mild LV systolic impairment. PA pressure not obtained andf may be  normal since there is no TR. Device cable in RV    SUMMARY:  Left ventricle: Size was at the upper limits of normal. Systolic function  was normal by visual assessment. Ejection fraction was estimated to be 40  % to 45 %. There were no regional wall motion abnormalities. There was  mild diffuse hypokinesis. Hypertrophy was noted. Right ventricle: The ventricle was mildly dilated. Wall thickness was at  the upper limits of normal.    Atrial septum: The septum appears intact with no unusual motion    Right atrium: The coronary sinus was dilated. Summary Measurements  2D measurements:  Left Atrium:   Ao Diam was 2.2 cm.  LA Diam was 3.4 cm.  LVOT Diam was 1.9  cm. Left Ventricle:   %FS was 24.7 %.  EDV(Teich) was 123.9 ml.  EF(Teich)  was 48.7 %.  ESV(Teich) was 63.6 ml.  IVSd was 1.1 cm.  LVEDV MOD A4C was  108.3 ml.  LVEF MOD A4C was 31.2 %.  LVESV MOD A4C was 74.6 ml.  LVIDd was  5.1 cm.  LVIDs was 3.8 cm.  LVLd A4C was 7.7 cm.  LVLs A4C was 6.9 cm.    LVPWd was 1 cm.  SV MOD A4C was 33.8 ml.  SV(Teich) was 60.3 ml. Unspecified Anatomy:   HONORIO Planimetry was 2.7 cm2.  AVAI Planimetry was 0  cm2/m2.  LAAs A4C was 11.6 cm2.  LAESV A-L A4C was 23.1 ml.  LAESV MOD A4C  was 19.9 ml.  LALs A4C was 4.9 cm.  MVA Planimetry was 5.6 cm2.  Briana was  11 cm2.  RAAs was 8.1 cm2.  RAEDV A-L was 23.1 ml.  RAEDV MOD was 21 ml. RAESV A-L was 13.5 ml.  RAESV MOD was 12.9 ml.  RALd was 4.5 cm.  RALs was  4.1 cm. CW measurements:  Mitral Valve:   MV PHT was 51.2 ms.  MV VTI was 17.3 cm.  MV Vmax was 0.7  m/s.  MV Vmean was 0.4 m/s.  MV maxPG was 1.8 mmHg.  MV meanPG was 0.8  mmHg.  MVA By PHT was 4.3 cm2. Pulmonic Valve:   AK Dec [place] was 2.8  m/s2.  AK DecT was 434.3 ms.  AK PHT was 125.9 ms.  AK Vmax was 1.2 m/s. AK maxPG was 5.9 mmHg. PW measurements:  Left Atrium:   LVOT Vmax was 0.7 m/s.  LVOT maxPG was 2 mmHg. Mitral Valve:   MV A Jose Alberto was 0.4 m/s.  MV Dec [place] was 2.7 m/s2.  MV DecT  was 118 ms.  MV E Jose Alberto was 0.3 m/s.  MV E/A Ratio was 0.8 . Pulmonic Valve:   PV Vmax was 0.7 m/s.  PV maxPG was 1.8 mmHg. Unspecified Anatomy:   Lateral A' was 0.1 m/s.  Lateral E' was 0.1 m/s. Lateral E/E' was 2.9 .  Septal A' was 0.1 m/s.  Septal E' was 0.1 m/s. Septal E/E' was 4.3 . INDICATIONS: Assess left ventricular function. HISTORY: Prior history: Follow up of post partum cardiomyopathy    PROCEDURE: This was a routine study. The study included complete 2D  imaging, M-mode, complete spectral Doppler, and color Doppler. The heart  rate was 82 bpm, at the start of the study. Systolic blood pressure was  110 mmHg, at the start of the study. Diastolic blood pressure was 70 mmHg,  at the start of the study. Images were obtained from the parasternal,  apical, subcostal, and suprasternal notch acoustic windows. Image quality  was good. LEFT VENTRICLE: Size was at the upper limits of normal. Systolic function  was normal by visual assessment. Ejection fraction was estimated to be 40  % to 45 %.  There were no regional wall motion abnormalities. There was  mild diffuse hypokinesis. Wall thickness was normal. Hypertrophy was  noted. DOPPLER: There was no dynamic obstruction. The transmitral flow  pattern was normal. The deceleration time of the early transmitral flow  velocity was normal. The pulmonary vein flow pattern was normal. Left  ventricular diastolic function parameters were normal.    RIGHT VENTRICLE: The ventricle was mildly dilated. Systolic function was  normal. There were no regional wall motion abnormalities. Wall thickness  was at the upper limits of normal. There was no evidence of a mass. A  pacing wire was present in the ventricular cavity. The tip was at the RV  apex. There was no associated thrombus. DOPPLER: Systolic pressure was not  estimated. LEFT ATRIUM: Size was at the upper limits of normal. No mass was present. ATRIAL SEPTUM: The septum appears intact with no unusual motion    RIGHT ATRIUM: Size was normal. CORONARY SINUS: The coronary sinus was  dilated. MITRAL VALVE: Normal valve structure. There was normal leaflet separation. DOPPLER: The transmitral velocity was within the normal range. There was  no evidence for stenosis. There was no regurgitation. AORTIC VALVE: The valve was trileaflet. Leaflets exhibited normal  thickness and normal cuspal separation. DOPPLER: Transaortic velocity was  within the normal range. There was no stenosis. There was no regurgitation. TRICUSPID VALVE: Normal valve structure. There was normal leaflet  separation. DOPPLER: The transtricuspid velocity was within the normal  range. There was no evidence for tricuspid stenosis. There was no  regurgitation. PULMONIC VALVE: Leaflets exhibited normal thickness, no calcification, and  normal cuspal separation. DOPPLER: The transpulmonic velocity was within  the normal range. There was trivial regurgitation. AORTA: The root exhibited normal size. PERICARDIUM: There was no pericardial effusion. The pericardium was normal  in appearance. SYSTEM MEASUREMENT TABLES    2D  Ao Diam: 2.2 cm  LA Diam: 3.4 cm  LVOT Diam: 1.9 cm  %FS: 24.7 %  EDV(Teich): 123.9 ml  EF(Teich): 48.7 %  ESV(Teich): 63.6 ml  IVSd: 1.1 cm  LVEDV MOD A4C: 108.3 ml  LVEF MOD A4C: 31.2 %  LVESV MOD A4C: 74.6 ml  LVIDd: 5.1 cm  LVIDs: 3.8 cm  LVLd A4C: 7.7 cm  LVLs A4C: 6.9 cm  LVPWd: 1 cm  SV MOD A4C: 33.8 ml  SV(Teich): 60.3 ml  HONORIO Planimetry: 2.7 cm2  AVAI Planimetry: 0 cm2/m2  LAAs A4C: 11.6 cm2  LAESV A-L A4C: 23.1 ml  LAESV MOD A4C: 19.9 ml  LALs A4C: 4.9 cm  MVA Planimetry: 5.6 cm2  Briana: 11 cm2  RAAs: 8.1 cm2  RAEDV A-L: 23.1 ml  RAEDV MOD: 21 ml  RAESV A-L: 13.5 ml  RAESV MOD: 12.9 ml  RALd: 4.5 cm  RALs: 4.1 cm    CW  MV PHT: 51.2 ms  MV VTI: 17.3 cm  MV Vmax: 0.7 m/s  MV Vmean: 0.4 m/s  MV maxP.8 mmHg  MV meanP.8 mmHg  MVA By PHT: 4.3 cm2  SD Dec [place]: 2.8 m/s2  SD DecT: 434.3 ms  SD PHT: 125.9 ms  SD Vmax: 1.2 m/s  SD maxP.9 mmHg    PW  LVOT Vmax: 0.7 m/s  LVOT maxP mmHg  MV A Jose Alberto: 0.4 m/s  MV Dec [place]: 2.7 m/s2  MV DecT: 118 ms  MV E Jose Alberto: 0.3 m/s  MV E/A Ratio: 0.8  PV Vmax: 0.7 m/s  PV maxP.8 mmHg  Lateral A': 0.1 m/s  Lateral E': 0.1 m/s  Lateral E/E': 2.9  Septal A': 0.1 m/s  Septal E': 0.1 m/s  Septal E/E': 4.3    Prepared and E-signed by    Harjinder Morse MD  Signed 10-Jul-2017 16:21:42    Assessment:       ICD-10-CM ICD-9-CM    1. Cardiomyopathy, peripartum, postpartum O90.3 674.54 valsartan (DIOVAN) 40 mg tablet      REFERRAL TO CARDIAC ELECTROPHYSIOLOGY      chlorthalidone (HYGROTEN) 25 mg tablet   2. Essential hypertension I10 401.9 valsartan (DIOVAN) 40 mg tablet      REFERRAL TO CARDIAC ELECTROPHYSIOLOGY      chlorthalidone (HYGROTEN) 25 mg tablet   3. ICD (implantable cardioverter-defibrillator) in place Z95.810 V45.02 valsartan (DIOVAN) 40 mg tablet      REFERRAL TO CARDIAC ELECTROPHYSIOLOGY      chlorthalidone (HYGROTEN) 25 mg tablet   4.  Snoring R06.83 786.09 valsartan (DIOVAN) 40 mg tablet      REFERRAL TO CARDIAC ELECTROPHYSIOLOGY      chlorthalidone (HYGROTEN) 25 mg tablet   5. Systolic CHF, chronic (HCC) I50.22 428.22 valsartan (DIOVAN) 40 mg tablet     428.0 REFERRAL TO CARDIAC ELECTROPHYSIOLOGY      chlorthalidone (HYGROTEN) 25 mg tablet    EF 40-45%         Discussion: Patient presents at this time stable from a cardiac perspective with worsening systolic function on recent echo. Pt shows slightly reduced EF of 40-45% (previously 55-60%); hypertrophy of LV and RV and dilation of RV --  new findings when compared with previous echo. Having mild symptoms consistent with mild systolic heart failure. Has not had routine ICD care/check up with previous EP MD, is asking for another practice to follow up with. Plan: 1. Continue Coreg 12.5 mg BID   -- Valsartan 40 mg every day   -- Chlorthalidone 12.5 mg every day   -- referral to Pacemaker clinic; [facility] Darian Duncan [facility]    2. Encouraged to exercise to tolerance, and follow low fat, low cholesterol, low sodium predominantly Plant-based (consider Mediterranean) diet. -- Keep daily weights and BP      -- Call with questions or concerns. -- Will follow up any test results by phone and/or f/u here in office if needed. [employer] Dials 3.Follow up: 2 weeks    I have discussed the diagnosis with the patient and the intended plan as seen in the above orders. The patient has received an after-visit summary and questions were answered concerning future plans. I have discussed any concerning medication side effects and warnings with the patient as well. Patient seen and examined. All pertinent data reviewed. I have reviewed detailed note as outlined by Elda Cruz. Case discussed with Nursing/medical assistant staff and Elda Cruz. Patient with recurrent reduction in LVEF 40-45% from 55-60% 2016 and 25-30% 2015 with hx of post-partum CM. Stopped meds on her own since last echo in 2016 and lost to f/u.  Will to resume ARB for ACE,continue Coreg as 12.5 mg BID and chlorthalidone. Adjust as BP will allow. Had been on digoxin since 2015. Plans as outlined.         Paco Rosas PA-C  7/14/2017    ADELFO Baker

## 2017-07-19 DIAGNOSIS — I10 ESSENTIAL HYPERTENSION: ICD-10-CM

## 2017-07-19 DIAGNOSIS — Z95.810 ICD (IMPLANTABLE CARDIOVERTER-DEFIBRILLATOR) IN PLACE: Primary | ICD-10-CM

## 2017-07-19 RX ORDER — CARVEDILOL 12.5 MG/1
6.25 TABLET ORAL 2 TIMES DAILY WITH MEALS
Qty: 30 TAB | Refills: 3 | Status: SHIPPED | OUTPATIENT
Start: 2017-07-19 | End: 2017-10-09 | Stop reason: SDUPTHER

## 2017-07-19 NOTE — PROGRESS NOTES
Ms. Cathy Persaud called on-call service tonight noting hypotension with systolic numbers in 89T today. She has taken only 12.5 mg coreg this morning and was questioning whether she should take chlorthalidone and valsartan tonight. She sounds fatigued and congested over the phone and patient endorses an URI. States she has not been taking much PO in past few days and feel dehydrated. She notes a 5 lb weight LOSS since chlorthalidone was initiated at her last OV on 7/14/17. I instructed her to hold her meds tonight, rest and drink plenty of fluids and I would reduce coreg to 6.25 BID starting tomorrow. Will follow up with her by phone tomorrow as well.     Balta Farmer PA-C

## 2017-07-25 ENCOUNTER — OFFICE VISIT (OUTPATIENT)
Dept: CARDIOLOGY CLINIC | Age: 32
End: 2017-07-25

## 2017-07-25 VITALS
HEART RATE: 87 BPM | SYSTOLIC BLOOD PRESSURE: 109 MMHG | DIASTOLIC BLOOD PRESSURE: 74 MMHG | BODY MASS INDEX: 30.93 KG/M2 | OXYGEN SATURATION: 99 % | RESPIRATION RATE: 18 BRPM | WEIGHT: 181.2 LBS | TEMPERATURE: 96.1 F | HEIGHT: 64 IN

## 2017-07-25 DIAGNOSIS — I10 ESSENTIAL HYPERTENSION: Primary | ICD-10-CM

## 2017-07-25 DIAGNOSIS — J06.9 UPPER RESPIRATORY TRACT INFECTION, UNSPECIFIED TYPE: ICD-10-CM

## 2017-07-25 DIAGNOSIS — Z95.810 ICD (IMPLANTABLE CARDIOVERTER-DEFIBRILLATOR) IN PLACE: ICD-10-CM

## 2017-07-25 NOTE — PROGRESS NOTES
Chief Complaint   Patient presents with    Hypertension     2 week f/u     1. Have you been to the ER, urgent care clinic since your last visit? Hospitalized since your last visit? No    2. Have you seen or consulted any other health care providers outside of the [address] [place] [address] since your last visit? Include any pap smears or colon screening.  Yes When: [facility] 7/18/2017 for sinus infection

## 2017-07-25 NOTE — MR AVS SNAPSHOT
Visit Information Date & Time Provider Department Dept. Phone Encounter #  
 7/25/2017 11:15 AM Claudeen Platts, MD [facility] at [address] [place] [address] [id number] Your Appointments 8/22/2017 10:35 AM  
ESTABLISHED PATIENT with Claudeen Platts, MD [facility] at [facility]) Appt Note: 6 MO. F/U  
 [address] [place]  
[phone] [address] Upcoming Health Maintenance Date Due  
 PAP AKA CERVICAL CYTOLOGY 9/24/2007 INFLUENZA AGE 9 TO ADULT 8/1/2017 DTaP/Tdap/Td series (3 - Td) 5/25/2026 Allergies as of 7/25/2017  Review Complete On: 7/25/2017 By: Uriel Saez LPN Severity Noted Reaction Type Reactions Other Medication  01/24/2013   Side Effect Hives Allergic, to dust mites, molds Pollen Extracts  05/12/2015    Hives Current Immunizations  Reviewed on 5/31/2016 Name Date Influenza Vaccine 10/15/2015,  Deferred (Patient Refused) Influenza Vaccine Intradermal PF 1/16/2017 Pneumococcal Polysaccharide (PPSV-23) 10/15/2015 TDAP Vaccine 8/20/2011 Tdap 5/25/2016  9:21 PM  
  
 Not reviewed this visit You Were Diagnosed With   
  
 Codes Comments Essential hypertension    -  Primary ICD-10-CM: I10 
ICD-9-CM: 401.9 Cardiomyopathy, peripartum, postpartum     ICD-10-CM: O90.3 ICD-9-CM: 674.54   
 ICD (implantable cardioverter-defibrillator) in place     ICD-10-CM: Z95.810 ICD-9-CM: V45.02 Upper respiratory tract infection, unspecified type     ICD-10-CM: J06.9 ICD-9-CM: 465.9 Vitals BP Pulse Temp Resp Height(growth percentile) Weight(growth percentile) 109/74 (BP 1 Location: Left arm, BP Patient Position: Sitting) 87 96.1 °F (35.6 °C) (Oral) 18 5' 4\" (1.626 m) 181 lb 3.2 oz (82.2 kg) LMP SpO2 BMI OB Status Smoking Status 07/18/2017 99% 31.1 kg/m2 Having regular periods Never Smoker BMI and BSA Data Body Mass Index Body Surface Area  
 31.1 kg/m 2 1.93 m 2 Preferred Pharmacy Pharmacy Name Phone [phone], [phone] [facility] [phone] Your Updated Medication List  
  
   
This list is accurate as of: 7/25/17 12:29 PM.  Always use your most recent med list.  
  
  
  
  
 carvedilol 12.5 mg tablet Commonly known as:  Ashley Solid Take 0.5 Tabs by mouth two (2) times daily (with meals). Indications: Chronic Heart Failure  
  
 chlorthalidone 25 mg tablet Commonly known as:  Rose Roles Take 0.5 Tabs by mouth daily. Indications: PERIPHERAL EDEMA DUE TO CHRONIC HEART FAILURE  
  
 ergocalciferol (vitamin D2) 2,000 unit Tab Take 1,000 Units by mouth daily. fluticasone 50 mcg/actuation nasal spray Commonly known as:  Abraham Gary 2 Sprays by Both Nostrils route daily. JUNEL FE 1.5/30 (28) 1.5 mg-30 mcg (21)/75 mg (7) Tab Generic drug:  norethindrone-ethinyl estradiol-iron  
  
 levocetirizine 5 mg tablet Commonly known as:  Fredrica Candido Take 1 Tab by mouth daily. valsartan 40 mg tablet Commonly known as:  DIOVAN Take 1 Tab by mouth daily. Indications: Chronic Heart Failure Introducing [facility]! Rebecca Jeffery introduces [employer] patient portal. Now you can access parts of your medical record, email your doctor's office, and request medication refills online. 1. In your internet browser, go to [email or web address] 2. Click on the First Time User? Click Here link in the Sign In box. You will see the New Member Sign Up page. 3. Enter your [employer] Access Code exactly as it appears below. You will not need to use this code after youve completed the sign-up process. If you do not sign up before the expiration date, you must request a new code. · [employer] Access Code: [email or web address] Expires: 9/5/2017 11:21 AM 
 
4. Enter the last four digits of your Social Security Number (xxxx) and Date of Birth (mm/dd/yyyy) as indicated and click Submit. You will be taken to the next sign-up page. 5. Create a [employer] ID. This will be your [employer] login ID and cannot be changed, so think of one that is secure and easy to remember. 6. Create a [employer] password. You can change your password at any time. 7. Enter your Password Reset Question and Answer. This can be used at a later time if you forget your password. 8. Enter your e-mail address. You will receive e-mail notification when new information is available in [address] 9. Click Sign Up. You can now view and download portions of your medical record. 10. Click the Download Summary menu link to download a portable copy of your medical information. If you have questions, please visit the Frequently Asked Questions section of the [employer] website. Remember, [employer] is NOT to be used for urgent needs. For medical emergencies, dial 911. Now available from your iPhone and Android! Please provide this summary of care documentation to your next provider. Your primary care clinician is listed as Ever Leigh. If you have any questions after today's visit, please call [phone].

## 2017-07-25 NOTE — PROGRESS NOTES
[facility]   [address] [facility], [address] [place] [address]                                          NEW PATIENT HPI/FOLLOW-UP      NAME:  Pavel oCok   :   1985   MRN:   [id number]   PCP:  [address] [place], [place]           Subjective: The patient is a 28y.o. year old female  who returns for a routine follow-up. Since the last visit, patient has spoken with STEPHEN Huertas as on-call provider with concerns for HYPOtension with lightheadedness upon standing on regimen of Coreg, valsartan and chlorthalidone. Pt reported she was fighting and URI and not tolerating PO as per her usual. She reported a 5 lb weight loss since starting the chlorthalidone. She was instructed to continue Coreg at half dose of only 6.25 mg BID and hold other medications. She reports she is doing better. She is mid-way through Amoxicillin regimen for her URI. She is taking PO a little better but she is still not feeling at her peak. Lightheadedness has resolved. Denies change in exercise tolerance, chest pain, edema, medication intolerance, palpitations, shortness of breath, PND/orthopnea wheezing, sputum, syncope, dizziness or light headedness. Doing satisfactorily. Review of Systems  General: Pt denies excessive weight gain or loss. Pt is able to conduct ADL's. Respiratory: Denies shortness of breath, MOULTON, wheezing or stridor.   Cardiovascular: Denies precordial pain, palpitations, edema or PND  Gastrointestinal: Denies poor appetite, indigestion, abdominal pain or blood in stool  Peripheral vascular: Denies claudication, leg cramps  Neuropsychiatric: Denies paresthesias,tingling,numbness,anxiety,depression,fatigue  Musculoskeletal: Denies pain,tenderness, soreness,swelling      Past Medical History:   Diagnosis Date    AICD (automatic cardioverter/defibrillator) present     Anemia NEC     Cardiomyopathy ([facility] [address]     Contact dermatitis and other eczema, due to unspecified cause     hives  Hypertension      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    Vitamin D deficiency 2011    Snoring 2011      Past Surgical History:   Procedure Laterality Date    HX GYN      HX GYN  13        HX PACEMAKER      HX PACEMAKER  3/19/15    biotronic icd #[id number]     Allergies   Allergen Reactions    Other Medication Hives     Allergic, to dust mites, molds    Pollen Extracts Hives      Family History   Problem Relation Age of Onset    Hypertension Mother     Hypertension Father     Diabetes Maternal Grandmother     Heart Disease Maternal Grandmother     Arthritis-osteo Maternal Grandfather     Asthma Brother     Arthritis-osteo Paternal Grandmother     Arthritis-osteo Paternal Grandfather       Social History     Social History    Marital status:      Spouse name: N/A    Number of children: 0    Years of education: N/A     Occupational History    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      Unknown     Social History Main Topics    Smoking status: Never Smoker    Smokeless tobacco: Never Used    Alcohol use No    Drug use: No      Comment: denies     Sexual activity: Yes     Partners: Male     Birth control/ protection: Pill     Other Topics Concern    Not on file     Social History Narrative    ** Merged History Encounter **           Current Outpatient Prescriptions   Medication Sig    carvedilol (COREG) 12.5 mg tablet Take 0.5 Tabs by mouth two (2) times daily (with meals). Indications: Chronic Heart Failure    JUNEL FE 1.5/ 28, 1.5 mg-30 mcg (21)/75 mg (7) tab     levocetirizine (XYZAL) 5 mg tablet Take 1 Tab by mouth daily.  ergocalciferol, vitamin D2, 2,000 unit tab Take 1,000 Units by mouth daily.  fluticasone (FLONASE) 50 mcg/actuation nasal spray 2 Sprays by Both Nostrils route daily.     valsartan (DIOVAN) 40 mg tablet Take 1 Tab by mouth daily. Indications: Chronic Heart Failure    chlorthalidone (HYGROTEN) 25 mg tablet Take 0.5 Tabs by mouth daily. Indications: PERIPHERAL EDEMA DUE TO CHRONIC HEART FAILURE     No current facility-administered medications for this visit. I have reviewed the nurses notes, vitals, problem list, allergy list, medical history, family medical, social history and medications. Objective:     Physical Exam:     Vitals:    07/25/17 1206   BP: 109/74   Pulse: 87   Resp: 18   Temp: 96.1 °F (35.6 °C)   TempSrc: Oral   SpO2: 99%   Weight: 181 lb 3.2 oz (82.2 kg)   Height: 5' 4\" (1.626 m)    Body mass index is 31.1 kg/(m^2). General: WDWN adult female, in no acute distress. Reserved in affect. HEENT: No carotid bruits, no JVD, trach is midline. Heart:  Normal S1/S2 negative S3 or S4. Regular, no murmur, gallop or rub.   Respiratory: Clear bilaterally, no wheezing or rales  Abdomen:   Soft, non-tender, bowel sounds are active.   Extremities:  No edema, normal cap refill, no cyanosis. Neuro: A&Ox3, speech clear, gait stable. Skin: Skin color is normal. No rashes or lesions. No diaphoresis. Vascular: 2+ pulses symmetric in all extremities        Data Review:       Cardiographics:    EKG: None today.     Cardiology Labs:    Results for orders placed or performed during the hospital encounter of 09/16/15   EKG, 12 LEAD, INITIAL   Result Value Ref Range    Ventricular Rate 73 BPM    Atrial Rate 73 BPM    P-R Interval 138 ms    QRS Duration 82 ms    Q-T Interval 448 ms    QTC Calculation (Bezet) 493 ms    Calculated P Axis 51 degrees    Calculated R Axis 44 degrees    Calculated T Axis 2 degrees    Diagnosis       Normal sinus rhythm  Normal ekg  Confirmed by Sher Mcgovern ([id number]) on 9/17/2015 7:29:30 AM         Lab Results   Component Value Date/Time    Cholesterol, total 139 06/02/2016 08:19 AM    HDL Cholesterol 45 06/02/2016 08:19 AM    LDL, calculated 76 06/02/2016 08:19 AM    Triglyceride 89 06/02/2016 08:19 AM       Lab Results   Component Value Date/Time    Sodium 142 01/17/2017 02:05 PM    Potassium 4.1 01/17/2017 02:05 PM    Chloride 106 01/17/2017 02:05 PM    CO2 20 01/17/2017 02:05 PM    Anion gap 9 09/16/2015 08:29 PM    Glucose 105 01/17/2017 02:05 PM    BUN 11 01/17/2017 02:05 PM    Creatinine 0.80 01/17/2017 02:05 PM    BUN/Creatinine ratio 14 01/17/2017 02:05 PM    GFR est  01/17/2017 02:05 PM    GFR est non-AA 99 01/17/2017 02:05 PM    Calcium 8.8 01/17/2017 02:05 PM    Bilirubin, total 0.3 09/16/2015 08:29 PM    AST (SGOT) 15 09/16/2015 08:29 PM    Alk. phosphatase 57 09/16/2015 08:29 PM    Protein, total 7.9 09/16/2015 08:29 PM    Albumin 3.5 09/16/2015 08:29 PM    Globulin 4.4 09/16/2015 08:29 PM    A-G Ratio 0.8 09/16/2015 08:29 PM    ALT (SGPT) 18 09/16/2015 08:29 PM          Assessment:       ICD-10-CM ICD-9-CM    1. Essential hypertension I10 401.9    2. Cardiomyopathy, peripartum, postpartum O90.3 674.54    3. ICD (implantable cardioverter-defibrillator) in place Z95.810 V45.02    4. Upper respiratory tract infection, unspecified type J06.9 465.9          Discussion: Patient presents at this time stable from a cardiac perspective. May not tolerate chlorthalidone in future. Last echo showed decreased EF of 40-45%. Would like to place her back on ARB when she shows tolerance, I.e after the URI is resolved. Plan: 1. Continue Coreg 6.25 mg BID   -- HOLD valsartan 40 mg    -- HOLD chlorthalidone    2. Encouraged to exercise to tolerance, lose weight, and follow low fat, low cholesterol, low sodium predominantly Plant-based (consider Mediterranean) diet. -- Call with questions or concerns. -- Will follow up any test results by phone and/or f/u here in office if needed. [place] 3.Follow up: 1 month    I have discussed the diagnosis with the patient and the intended plan as seen in the above orders.   The patient has received an after-visit summary and questions were answered concerning future plans. I have discussed any concerning medication side effects and warnings with the patient as well. Patient seen and examined. All pertinent data reviewed. I have reviewed detailed note as outlined by Todd Razo. Case discussed with Nursing/medical assistant staff and Todd Razo. Patient with vasodilator intolerance. Plans as outlined.     Indu Mooney PA-C  7/25/2017     ADELFO Rosales

## 2017-08-15 ENCOUNTER — CLINICAL SUPPORT (OUTPATIENT)
Dept: CARDIOLOGY CLINIC | Age: 32
End: 2017-08-15

## 2017-08-15 DIAGNOSIS — Z95.810 ICD (IMPLANTABLE CARDIOVERTER-DEFIBRILLATOR) IN PLACE: Primary | ICD-10-CM

## 2017-09-05 ENCOUNTER — OFFICE VISIT (OUTPATIENT)
Dept: CARDIOLOGY CLINIC | Age: 32
End: 2017-09-05

## 2017-09-05 VITALS
BODY MASS INDEX: 31.48 KG/M2 | HEART RATE: 112 BPM | OXYGEN SATURATION: 99 % | WEIGHT: 184.4 LBS | HEIGHT: 64 IN | SYSTOLIC BLOOD PRESSURE: 123 MMHG | DIASTOLIC BLOOD PRESSURE: 81 MMHG

## 2017-09-05 DIAGNOSIS — Z95.810 ICD (IMPLANTABLE CARDIOVERTER-DEFIBRILLATOR) IN PLACE: ICD-10-CM

## 2017-09-05 DIAGNOSIS — R06.83 SNORING: ICD-10-CM

## 2017-09-05 DIAGNOSIS — I10 ESSENTIAL HYPERTENSION: Primary | ICD-10-CM

## 2017-09-05 RX ORDER — AMOXICILLIN 875 MG/1
TABLET, FILM COATED ORAL
COMMUNITY
Start: 2017-07-18 | End: 2017-09-05 | Stop reason: ALTCHOICE

## 2017-09-05 RX ORDER — CARVEDILOL 25 MG/1
TABLET ORAL
COMMUNITY
Start: 2017-07-03 | End: 2017-09-05 | Stop reason: ALTCHOICE

## 2017-09-05 NOTE — PROGRESS NOTES
[place]   [address] [facility], [address] [place] [address]                                          NEW PATIENT HPI/FOLLOW-UP      NAME:  Chitra Hunt   :   1985   MRN:   [id number]   PCP:  Amaury Oconnor MD           Subjective: The patient is a 28y.o. year old female h/o HTN and more recently HYPOtension, post/peripartum cardiomyopathy with ICD in situ who returns for a routine follow-up. Since the last visit, patient reports no further episodes of hypotension. Started back on Valsartan 40 mg after she finished Amoxicillin course. Is not recording, but is tracking home BPs and she states they have been in 120s/80s. Denies change in exercise tolerance, chest pain, edema, medication intolerance, palpitations, shortness of breath, PND/orthopnea wheezing, sputum, syncope, dizziness or light headedness. Doing satisfactorily. Review of Systems  General: Pt denies excessive weight gain or loss. Pt is able to conduct ADL's. Respiratory: Denies shortness of breath, MOULTON, wheezing or stridor.   Cardiovascular: Denies precordial pain, palpitations, edema or PND  Gastrointestinal: Denies poor appetite, indigestion, abdominal pain or blood in stool  Peripheral vascular: Denies claudication, leg cramps  Neuropsychiatric: Denies paresthesias,tingling,numbness,anxiety,depression,fatigue  Musculoskeletal: Denies pain,tenderness, soreness,swelling      Past Medical History:   Diagnosis Date    AICD (automatic cardioverter/defibrillator) present     Anemia NEC     Cardiomyopathy ([facility] [address]     Contact dermatitis and other eczema, due to unspecified cause     hives    Hypertension      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    Vitamin D deficiency 2011    Snoring 2011      Past Surgical History:   Procedure Laterality Date    HX GYN      HX GYN  13        HX PACEMAKER      HX PACEMAKER  3/19/15    biotronic icd #[id number]     Allergies   Allergen Reactions    Other Medication Hives     Allergic, to dust mites, molds    Pollen Extracts Hives      Family History   Problem Relation Age of Onset    Hypertension Mother     Hypertension Father     Diabetes Maternal Grandmother     Heart Disease Maternal Grandmother     Arthritis-osteo Maternal Grandfather     Asthma Brother     Arthritis-osteo Paternal Grandmother     Arthritis-osteo Paternal Grandfather       Social History     Social History    Marital status:      Spouse name: N/A    Number of children: 0    Years of education: N/A     Occupational History    long-term analyist      Unknown     Social History Main Topics    Smoking status: Never Smoker    Smokeless tobacco: Never Used    Alcohol use No    Drug use: No      Comment: denies     Sexual activity: Yes     Partners: Male     Birth control/ protection: Pill     Other Topics Concern    Not on file     Social History Narrative    ** Merged History Encounter **           Current Outpatient Prescriptions   Medication Sig    CHOLECALCIFEROL, VITAMIN D3, (VITAMIN D3 PO) Take  by mouth daily.  carvedilol (COREG) 12.5 mg tablet Take 0.5 Tabs by mouth two (2) times daily (with meals). Indications: Chronic Heart Failure    valsartan (DIOVAN) 40 mg tablet Take 1 Tab by mouth daily. Indications: Chronic Heart Failure    chlorthalidone (HYGROTEN) 25 mg tablet Take 0.5 Tabs by mouth daily. Indications: PERIPHERAL EDEMA DUE TO CHRONIC HEART FAILURE    levocetirizine (XYZAL) 5 mg tablet Take 1 Tab by mouth daily.  fluticasone (FLONASE) 50 mcg/actuation nasal spray 2 Sprays by Both Nostrils route daily. No current facility-administered medications for this visit.          I have reviewed the MAs notes, vitals, problem list, allergy list, medical history, family medical, social history and medications. Objective:     Physical Exam:     Vitals:    09/05/17 0946   BP: 123/81   Pulse: (!) 112   SpO2: 99%   Weight: 184 lb 6.4 oz (83.6 kg)   Height: 5' 4\" (1.626 m)    Body mass index is 31.65 kg/(m^2). General: WDWN adult female, in no acute distress. Pleasant affect. HEENT: No carotid bruits, no JVD, trach is midline. Heart:  Normal S1/S2 negative S3 or S4. Regular, no murmur, gallop or rub.   Respiratory: Clear bilaterally, no wheezing or rales  Abdomen:   Soft, non-tender, bowel sounds are active.   Extremities:  No edema, normal cap refill, no cyanosis. Neuro: A&Ox3, speech clear, gait stable. Skin: Skin color is normal. No rashes or lesions. No diaphoresis. Vascular: 2+ pulses symmetric in all extremities      Data Review:       Cardiographics:    EKG: Sinus tachycardia to 108 bpm. Normal axis. No ST segment changes, flattened T waves laterally.     Cardiology Labs:    Results for orders placed or performed during the hospital encounter of 09/16/15   EKG, 12 LEAD, INITIAL   Result Value Ref Range    Ventricular Rate 73 BPM    Atrial Rate 73 BPM    P-R Interval 138 ms    QRS Duration 82 ms    Q-T Interval 448 ms    QTC Calculation (Bezet) 493 ms    Calculated P Axis 51 degrees    Calculated R Axis 44 degrees    Calculated T Axis 2 degrees    Diagnosis       Normal sinus rhythm  Normal ekg  Confirmed by Sher Mcgovern ([id number]) on 9/17/2015 7:29:30 AM         Lab Results   Component Value Date/Time    Cholesterol, total 139 06/02/2016 08:19 AM    HDL Cholesterol 45 06/02/2016 08:19 AM    LDL, calculated 76 06/02/2016 08:19 AM    Triglyceride 89 06/02/2016 08:19 AM       Lab Results   Component Value Date/Time    Sodium 142 01/17/2017 02:05 PM    Potassium 4.1 01/17/2017 02:05 PM    Chloride 106 01/17/2017 02:05 PM    CO2 20 01/17/2017 02:05 PM    Anion gap 9 09/16/2015 08:29 PM    Glucose 105 01/17/2017 02:05 PM    BUN 11 01/17/2017 02:05 PM Creatinine 0.80 01/17/2017 02:05 PM    BUN/Creatinine ratio 14 01/17/2017 02:05 PM    GFR est  01/17/2017 02:05 PM    GFR est non-AA 99 01/17/2017 02:05 PM    Calcium 8.8 01/17/2017 02:05 PM    Bilirubin, total 0.3 09/16/2015 08:29 PM    AST (SGOT) 15 09/16/2015 08:29 PM    Alk. phosphatase 57 09/16/2015 08:29 PM    Protein, total 7.9 09/16/2015 08:29 PM    Albumin 3.5 09/16/2015 08:29 PM    Globulin 4.4 09/16/2015 08:29 PM    A-G Ratio 0.8 09/16/2015 08:29 PM    ALT (SGPT) 18 09/16/2015 08:29 PM          Assessment:       ICD-10-CM ICD-9-CM    1. Essential hypertension I10 401.9 AMB POC EKG ROUTINE W/ 12 LEADS, INTER & REP   2. Cardiomyopathy, peripartum, postpartum O90.3 674.54    3. ICD (implantable cardioverter-defibrillator) in place Z95.810 V45.02          Discussion: Patient presents at this time stable from a cardiac perspective. BP well controlled, on Coreg and Valsartan. Episodes of HYPOtension appear to have resolved with improvement in URI and completing of Abx course. Will slowly increase BB to help with tachycardia and achieve highest tolerable dose for CM. Generally pleased with present status. Plan:     1. INCREASE Coreg to 12.5 mg BID   -- Continue Valsartan 40 mg every day    2. Encouraged to exercise to tolerance, lose weight, ng and follow low fat, low cholesterol, low sodium predominantly Plant-based (consider Mediterranean) diet. 3.Follow up: 1 month in office; 2 weeks by phone with home BP readings   -- Call with questions or concerns. I have discussed the diagnosis with the patient and the intended plan as seen in the above orders. The patient has received an after-visit summary and questions were answered concerning future plans. I have discussed any concerning medication side effects and warnings with the patient as well. Patient seen and examined. All pertinent data reviewed. I have reviewed detailed note as outlined by Todd Razo.  Case discussed with Nursing/medical assistant staff and Kathytej Nick. Patient presents for further adjustment of Cardiomyopathy meds with hx of post-partum CM. Had been on Coreg 25 mg BID and digoxin before stopping both meds herself. Goal is to resume to same. Plans as outlined.       Charla Motley PA-C  9/5/2017     EMANUEL Nonnie Cipro

## 2017-09-05 NOTE — MR AVS SNAPSHOT
Visit Information Date & Time Provider Department Dept. Phone Encounter #  
 9/5/2017  9:30 AM Claudeen Platts, MD [facility] 1 [id number] Your Appointments 11/30/2017  8:45 AM  
PACEMAKER with PACEMAKER, [facility]) Appt Note: 3mo bio scicd [address]  
[phone] [address]  
  
    
 11/30/2017  9:00 AM  
New Patient with Mariola Oconnor MD  
[facility]) Appt Note: Dr. Fanny Roman patient, [address]  
[phone] [address]  
  
    
  
 11/17/2017  8:00 AM  
REMOTE OFFICE VISIT with [facility]) Appt Note: NOT AN OFFICE VISIT - BIOTRONIK ICD  
 [id number]  
[phone] [address] Upcoming Health Maintenance Date Due  
 PAP AKA CERVICAL CYTOLOGY 9/24/2007 INFLUENZA AGE 9 TO ADULT 8/1/2017 DTaP/Tdap/Td series (3 - Td) 5/25/2026 Allergies as of 9/5/2017  Review Complete On: 9/5/2017 By: Monet Yost Severity Noted Reaction Type Reactions Other Medication  01/24/2013   Side Effect Hives Allergic, to dust mites, molds Pollen Extracts  05/12/2015    Hives Current Immunizations  Reviewed on 5/31/2016 Name Date Influenza Vaccine 10/15/2015,  Deferred (Patient Refused) Influenza Vaccine Intradermal PF 1/16/2017 Pneumococcal Polysaccharide (PPSV-23) 10/15/2015 TDAP Vaccine 8/20/2011 Tdap 5/25/2016  9:21 PM  
  
 Not reviewed this visit You Were Diagnosed With   
  
 Codes Comments Essential hypertension    -  Primary ICD-10-CM: I10 
ICD-9-CM: 401.9 Cardiomyopathy, peripartum, postpartum     ICD-10-CM: O90.3 ICD-9-CM: 674.54   
 ICD (implantable cardioverter-defibrillator) in place     ICD-10-CM: Z95.810 ICD-9-CM: V45.02 Snoring     ICD-10-CM: R06.83 
ICD-9-CM: 786.09 Vitals BP Pulse Height(growth percentile) Weight(growth percentile) SpO2 BMI  
 123/81 (!) 112 5' 4\" (1.626 m) 184 lb 6.4 oz (83.6 kg) 99% 31.65 kg/m2 OB Status Smoking Status Having regular periods Never Smoker Vitals History BMI and BSA Data Body Mass Index Body Surface Area  
 31.65 kg/m 2 1.94 m 2 Preferred Pharmacy Pharmacy Name Phone [address] [facility], [address] [phone] Your Updated Medication List  
  
   
This list is accurate as of: 9/5/17 10:14 AM.  Always use your most recent med list.  
  
  
  
  
 carvedilol 12.5 mg tablet Commonly known as:  Devin Jamison Take 0.5 Tabs by mouth two (2) times daily (with meals). Indications: Chronic Heart Failure  
  
 chlorthalidone 25 mg tablet Commonly known as:  Tavo Ma Take 0.5 Tabs by mouth daily. Indications: PERIPHERAL EDEMA DUE TO CHRONIC HEART FAILURE  
  
 fluticasone 50 mcg/actuation nasal spray Commonly known as:  Shakila Geetha 2 Sprays by Both Nostrils route daily. levocetirizine 5 mg tablet Commonly known as:  Charol High Take 1 Tab by mouth daily. valsartan 40 mg tablet Commonly known as:  DIOVAN Take 1 Tab by mouth daily. Indications: Chronic Heart Failure VITAMIN D3 PO Take  by mouth daily. We Performed the Following AMB POC EKG ROUTINE W/ 12 LEADS, INTER & REP [68442 CPT(R)] Patient Instructions Follow up: 1 month in office; 2 weeks by phone with home Blood Pressure readings INCREASE Coreg to 12.5 mg twice a day 
 -- Continue Valsartan 40 mg every day Encouraged to exercise to tolerance, and follow low fat, low cholesterol, low sodium predominantly Plant-based (consider Mediterranean) diet. Learning About the [address] Diet What is the Mediterranean diet? The Mediterranean diet is a style of eating rather than a diet plan. It features foods eaten in [place], [place], [place] and [place], and other countries along the [facility]. It emphasizes eating foods like fish, fruits, vegetables, beans, high-fiber breads and whole grains, nuts, and olive oil. This style of eating includes limited red meat, cheese, and sweets. Why choose the Mediterranean diet? A Mediterranean-style diet may improve heart health. It contains more fat than other heart-healthy diets. But the fats are mainly from nuts, unsaturated oils (such as fish oils and olive oil), and certain nut or seed oils (such as canola, soybean, or flaxseed oil). These fats may help protect the heart and blood vessels. How can you get started on the Mediterranean diet? Here are some things you can do to switch to a more Mediterranean way of eating. What to eat · Eat a variety of fruits and vegetables each day, such as grapes, blueberries, tomatoes, broccoli, peppers, figs, olives, spinach, eggplant, beans, lentils, and chickpeas. · Eat a variety of whole-grain foods each day, such as oats, brown rice, and whole wheat bread, pasta, and couscous. · Eat fish at least 2 times a week. Try tuna, salmon, mackerel, lake trout, herring, or sardines. · Eat moderate amounts of low-fat dairy products, such as milk, cheese, or yogurt. · Eat moderate amounts of poultry and eggs. · Choose healthy (unsaturated) fats, such as nuts, olive oil, and certain nut or seed oils like canola, soybean, and flaxseed. · Limit unhealthy (saturated) fats, such as butter, palm oil, and coconut oil. And limit fats found in animal products, such as meat and dairy products made with whole milk. Try to eat red meat only a few times a month in very small amounts. · Limit sweets and desserts to only a few times a week. This includes sugar-sweetened drinks like soda. The Mediterranean diet may also include red wine with your meal1 glass each day for women and up to 2 glasses a day for men. Tips for eating at home · Use herbs, spices, garlic, lemon zest, and citrus juice instead of salt to add flavor to foods. · Add avocado slices to your sandwich instead of marlow. · Have fish for lunch or dinner instead of red meat. Brush the fish with olive oil, and broil or grill it. · Sprinkle your salad with seeds or nuts instead of cheese. · Cook with olive or canola oil instead of butter or oils that are high in saturated fat. · Switch from 2% milk or whole milk to 1% or fat-free milk. · Dip raw vegetables in a vinaigrette dressing or hummus instead of dips made from mayonnaise or sour cream. 
· Have a piece of fruit for dessert instead of a piece of cake. Try baked apples, or have some dried fruit. Tips for eating out · Try broiled, grilled, baked, or poached fish instead of having it fried or breaded. · Ask your  to have your meals prepared with olive oil instead of butter. · Order dishes made with marinara sauce or sauces made from olive oil. Avoid sauces made from cream or mayonnaise. · Choose whole-grain breads, whole wheat pasta and pizza crust, brown rice, beans, and lentils. · Cut back on butter or margarine on bread. Instead, you can dip your bread in a small amount of olive oil. · Ask for a side salad or grilled vegetables instead of french fries or chips. Where can you learn more? Go to http://desiree-kinga.info/. Enter 510-574-5665 in the search box to learn more about \"Learning About the Mediterranean Diet. \" Current as of: December 29, 2016 Content Version: 11.3 © 3916-9119 [employer], [employer].  Care instructions adapted under license by [employer] (which disclaims liability or warranty for this information). If you have questions about a medical condition or this instruction, always ask your healthcare professional. [address] any warranty or liability for your use of this information. Introducing [facility]! Bhavik Erazo introduces [email or web address] patient portal. Now you can access parts of your medical record, email your doctor's office, and request medication refills online. 1. In your internet browser, go to [email or web address] 2. Click on the First Time User? Click Here link in the Sign In box. You will see the New Member Sign Up page. 3. Enter your [email or web address].com Access Code exactly as it appears below. You will not need to use this code after youve completed the sign-up process. If you do not sign up before the expiration date, you must request a new code. · [email or web address] Access Code: [email or web address] Expires: 9/5/2017 11:21 AM 
 
4. Enter the last four digits of your Social Security Number (xxxx) and Date of Birth (mm/dd/yyyy) as indicated and click Submit. You will be taken to the next sign-up page. 5. Create a [email or web address] ID. This will be your [email or web address] login ID and cannot be changed, so think of one that is secure and easy to remember. 6. Create a [email or web address].com password. You can change your password at any time. 7. Enter your Password Reset Question and Answer. This can be used at a later time if you forget your password. 8. Enter your e-mail address. You will receive e-mail notification when new information is available in [address] 9. Click Sign Up. You can now view and download portions of your medical record. 10. Click the Download Summary menu link to download a portable copy of your medical information. If you have questions, please visit the Frequently Asked Questions section of the [email or web address] website. Remember, [email or web address].com is NOT to be used for urgent needs. For medical emergencies, dial 911. Now available from your iPhone and Android! Please provide this summary of care documentation to your next provider. Your primary care clinician is listed as Blake Williamson. If you have any questions after today's visit, please call [phone].

## 2017-09-05 NOTE — PATIENT INSTRUCTIONS
Follow up: 1 month in office; 2 weeks by phone with home Blood Pressure readings    INCREASE Coreg to 12.5 mg twice a day   -- Continue Valsartan 40 mg every day    Encouraged to exercise to tolerance, and follow low fat, low cholesterol, low sodium predominantly Plant-based (consider Mediterranean) diet. Learning About the [address] Diet  What is the Mediterranean diet? The Mediterranean diet is a style of eating rather than a diet plan. It features foods eaten in [place], [place], [place] and [place], and other countries along the . It emphasizes eating foods like fish, fruits, vegetables, beans, high-fiber breads and whole grains, nuts, and olive oil. This style of eating includes limited red meat, cheese, and sweets. Why choose the Mediterranean diet? A Mediterranean-style diet may improve heart health. It contains more fat than other heart-healthy diets. But the fats are mainly from nuts, unsaturated oils (such as fish oils and olive oil), and certain nut or seed oils (such as canola, soybean, or flaxseed oil). These fats may help protect the heart and blood vessels. How can you get started on the Mediterranean diet? Here are some things you can do to switch to a more Mediterranean way of eating. What to eat  · Eat a variety of fruits and vegetables each day, such as grapes, blueberries, tomatoes, broccoli, peppers, figs, olives, spinach, eggplant, beans, lentils, and chickpeas. · Eat a variety of whole-grain foods each day, such as oats, brown rice, and whole wheat bread, pasta, and couscous. · Eat fish at least 2 times a week. Try tuna, salmon, mackerel, lake trout, herring, or sardines. · Eat moderate amounts of low-fat dairy products, such as milk, cheese, or yogurt. · Eat moderate amounts of poultry and eggs. · Choose healthy (unsaturated) fats, such as nuts, olive oil, and certain nut or seed oils like canola, soybean, and flaxseed.   · Limit unhealthy (saturated) fats, such as butter, palm oil, and coconut oil. And limit fats found in animal products, such as meat and dairy products made with whole milk. Try to eat red meat only a few times a month in very small amounts. · Limit sweets and desserts to only a few times a week. This includes sugar-sweetened drinks like soda. The Mediterranean diet may also include red wine with your meal--1 glass each day for women and up to 2 glasses a day for men. Tips for eating at home  · Use herbs, spices, garlic, lemon zest, and citrus juice instead of salt to add flavor to foods. · Add avocado slices to your sandwich instead of marlow. · Have fish for lunch or dinner instead of red meat. Brush the fish with olive oil, and broil or grill it. · Sprinkle your salad with seeds or nuts instead of cheese. · Cook with olive or canola oil instead of butter or oils that are high in saturated fat. · Switch from 2% milk or whole milk to 1% or fat-free milk. · Dip raw vegetables in a vinaigrette dressing or hummus instead of dips made from mayonnaise or sour cream.  · Have a piece of fruit for dessert instead of a piece of cake. Try baked apples, or have some dried fruit. Tips for eating out  · Try broiled, grilled, baked, or poached fish instead of having it fried or breaded. · Ask your  to have your meals prepared with olive oil instead of butter. · Order dishes made with marinara sauce or sauces made from olive oil. Avoid sauces made from cream or mayonnaise. · Choose whole-grain breads, whole wheat pasta and pizza crust, brown rice, beans, and lentils. · Cut back on butter or margarine on bread. Instead, you can dip your bread in a small amount of olive oil. · Ask for a side salad or grilled vegetables instead of french fries or chips. Where can you learn more? Go to http://desiree-kinga.info/. Enter 125-737-6344 in the search box to learn more about \"Learning About the Mediterranean Diet. \"  Current as of: December 29, 2016  Content Version: 11.3  © 1393-6687 [employer]. Care instructions adapted under license by [employer] (which disclaims liability or warranty for this information). If you have questions about a medical condition or this instruction, always ask your healthcare professional. [place] [address] any warranty or liability for your use of this information.

## 2017-10-03 ENCOUNTER — OFFICE VISIT (OUTPATIENT)
Dept: CARDIOLOGY CLINIC | Age: 32
End: 2017-10-03

## 2017-10-03 VITALS
DIASTOLIC BLOOD PRESSURE: 75 MMHG | WEIGHT: 182.6 LBS | BODY MASS INDEX: 31.18 KG/M2 | HEIGHT: 64 IN | OXYGEN SATURATION: 100 % | SYSTOLIC BLOOD PRESSURE: 112 MMHG | HEART RATE: 96 BPM

## 2017-10-03 DIAGNOSIS — R06.83 SNORING: ICD-10-CM

## 2017-10-03 DIAGNOSIS — Z95.810 ICD (IMPLANTABLE CARDIOVERTER-DEFIBRILLATOR) IN PLACE: ICD-10-CM

## 2017-10-03 DIAGNOSIS — I10 ESSENTIAL HYPERTENSION: Primary | ICD-10-CM

## 2017-10-03 NOTE — PROGRESS NOTES
[facility]   [address] [facility], [address] [place] [address]                                          NEW PATIENT HPI/FOLLOW-UP      NAME:  Lamine Lorenzana   :   1985   MRN:   [id number]   PCP:  Jessi Nicolas MD           Subjective: The patient is a 33y.o. year old female  who returns for a routine follow-up. Since the last visit, patient reports no new symptoms. BP < 130/85. HR's in 80-90's. Denies change in exercise tolerance, chest pain, edema, medication intolerance, palpitations, shortness of breath, PND/orthopnea wheezing, sputum, syncope, dizziness or light headedness. Doing satisfactorily. Review of Systems  General: Pt denies excessive weight gain or loss. Pt is able to conduct ADL's. Respiratory: Denies shortness of breath, MOULTON, wheezing or stridor.   Cardiovascular: Denies precordial pain, palpitations, edema or PND  Gastrointestinal: Denies poor appetite, indigestion, abdominal pain or blood in stool  Peripheral vascular: Denies claudication, leg cramps  Neuropsychiatric: Denies paresthesias,tingling,numbness,anxiety,depression,fatigue  Musculoskeletal: Denies pain,tenderness, soreness,swelling      Past Medical History:   Diagnosis Date    AICD (automatic cardioverter/defibrillator) present     Anemia NEC     Cardiomyopathy ([facility] [address]     Contact dermatitis and other eczema, due to unspecified cause     hives    Hypertension      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    Vitamin D deficiency 2011    Snoring 2011      Past Surgical History:   Procedure Laterality Date    HX GYN      HX GYN  13        HX PACEMAKER      HX PACEMAKER  3/19/15    biotronic icd #[id number]     Allergies   Allergen Reactions    Other Medication Hives     Allergic, to dust mites, molds    Pollen Extracts Hives      Family History   Problem Relation Age of Onset    Hypertension Mother     Hypertension Father     Diabetes Maternal Grandmother     Heart Disease Maternal Grandmother     Arthritis-osteo Maternal Grandfather     Asthma Brother     Arthritis-osteo Paternal Grandmother     Arthritis-osteo Paternal Grandfather       Social History     Social History    Marital status:      Spouse name: N/A    Number of children: 0    Years of education: N/A     Occupational History    halfway analyist      Unknown     Social History Main Topics    Smoking status: Never Smoker    Smokeless tobacco: Never Used    Alcohol use No    Drug use: No      Comment: denies     Sexual activity: Yes     Partners: Male     Birth control/ protection: Pill     Other Topics Concern    Not on file     Social History Narrative    ** Merged History Encounter **           Current Outpatient Prescriptions   Medication Sig    CHOLECALCIFEROL, VITAMIN D3, (VITAMIN D3 PO) Take  by mouth daily.  carvedilol (COREG) 12.5 mg tablet Take 0.5 Tabs by mouth two (2) times daily (with meals). Indications: Chronic Heart Failure    valsartan (DIOVAN) 40 mg tablet Take 1 Tab by mouth daily. Indications: Chronic Heart Failure    chlorthalidone (HYGROTEN) 25 mg tablet Take 0.5 Tabs by mouth daily. Indications: PERIPHERAL EDEMA DUE TO CHRONIC HEART FAILURE    levocetirizine (XYZAL) 5 mg tablet Take 1 Tab by mouth daily.  fluticasone (FLONASE) 50 mcg/actuation nasal spray 2 Sprays by Both Nostrils route daily. No current facility-administered medications for this visit. I have reviewed the nurses notes, vitals, problem list, allergy list, medical history, family medical, social history and medications.         Objective:     Physical Exam:     Vitals:    10/03/17 1021   BP: 112/75   Pulse: 96   SpO2: 100%   Weight: 182 lb 9.6 oz (82.8 kg)   Height: 5' 4\" (1.626 m)    Body mass index is 31.34 kg/(m^2). General: Well developed, in no acute distress. HEENT: No carotid bruits, no JVD, trach is midline. Heart:  Normal S1/S2 negative S3 or S4. Regular, no murmur, gallop or rub.   Respiratory: Clear bilaterally, no wheezing or rales  Abdomen:   Soft, non-tender, bowel sounds are active.   Extremities:  No edema, normal cap refill, no cyanosis. Neuro: A&Ox3, speech clear, gait stable. Skin: Skin color is normal. No rashes or lesions. No diaphoresis. Vascular: 2+ pulses symmetric in all extremities        Data Review:       Cardiographics:    Cardiology Labs:    Results for orders placed or performed during the hospital encounter of 09/16/15   EKG, 12 LEAD, INITIAL   Result Value Ref Range    Ventricular Rate 73 BPM    Atrial Rate 73 BPM    P-R Interval 138 ms    QRS Duration 82 ms    Q-T Interval 448 ms    QTC Calculation (Bezet) 493 ms    Calculated P Axis 51 degrees    Calculated R Axis 44 degrees    Calculated T Axis 2 degrees    Diagnosis       Normal sinus rhythm  Normal ekg  Confirmed by Sole Long ([id number]) on 9/17/2015 7:29:30 AM         Lab Results   Component Value Date/Time    Cholesterol, total 139 06/02/2016 08:19 AM    HDL Cholesterol 45 06/02/2016 08:19 AM    LDL, calculated 76 06/02/2016 08:19 AM    Triglyceride 89 06/02/2016 08:19 AM       Lab Results   Component Value Date/Time    Sodium 142 01/17/2017 02:05 PM    Potassium 4.1 01/17/2017 02:05 PM    Chloride 106 01/17/2017 02:05 PM    CO2 20 01/17/2017 02:05 PM    Anion gap 9 09/16/2015 08:29 PM    Glucose 105 01/17/2017 02:05 PM    BUN 11 01/17/2017 02:05 PM    Creatinine 0.80 01/17/2017 02:05 PM    BUN/Creatinine ratio 14 01/17/2017 02:05 PM    GFR est  01/17/2017 02:05 PM    GFR est non-AA 99 01/17/2017 02:05 PM    Calcium 8.8 01/17/2017 02:05 PM    Bilirubin, total 0.3 09/16/2015 08:29 PM    AST (SGOT) 15 09/16/2015 08:29 PM    Alk.  phosphatase 57 09/16/2015 08:29 PM    Protein, total 7.9 09/16/2015 08:29 PM Albumin 3.5 09/16/2015 08:29 PM    Globulin 4.4 09/16/2015 08:29 PM    A-G Ratio 0.8 09/16/2015 08:29 PM    ALT (SGPT) 18 09/16/2015 08:29 PM          Assessment:       ICD-10-CM ICD-9-CM    1. Essential hypertension I10 401.9    2. Cardiomyopathy, peripartum, postpartum O90.3 674.54    3. ICD (implantable cardioverter-defibrillator) in place Z95.810 V45.02    4. Snoring R06.83 786.09          Discussion: Patient presents at this time stable from a cardiac perspective. BP at goal. HR tad up into 80-90's. Will increase Coreg to 18.75 mg BID. Pleased with present cardiac status. Plan: 1. Continue same meds. Lipid profile and labs followed by PCP. 2.Encouraged to exercise to tolerance, lose weight and follow low fat, low cholesterol, low sodium predominantly Plant-based (consider Mediterranean) diet. Call with questions or concerns. Will follow up any test results by phone and/or f/u here in office if needed. Jg Jones 3.Follow up: 1 month. Call with HR and BP in 1 week. Adjust meds as needed. Had been on Coreg 25 mgf BID prior to patient self adjusting back to lower doses. Repeat echo 1-2 months. I have discussed the diagnosis with the patient and the intended plan as seen in the above orders. The patient has received an after-visit summary and questions were answered concerning future plans. I have discussed any concerning medication side effects and warnings with the patient as well.     Amrit Barlow MD  10/3/2017

## 2017-10-03 NOTE — MR AVS SNAPSHOT
Visit Information Date & Time Provider Department Dept. Phone Encounter #  
 10/3/2017  9:15 AM Giovanna Carmichael MD [facility] at [facility] [id number] Your Appointments 11/30/2017  8:45 AM  
PACEMAKER with PACEMAKER, [facility] [address]) Appt Note: 3mo bio scicd [address]  
[phone] [address]  
  
    
 11/30/2017  9:00 AM  
New Patient with [facility] [address]) Appt Note: Dr. Esquivel Poor patient, [address] [address] [facility]  
[phone] [address]  
  
    
  
 11/17/2017  8:00 AM  
REMOTE OFFICE VISIT with [facility] [address]) Appt Note: NOT AN OFFICE VISIT - BIOTRONIK ICD  
 [address]  
[phone] [address] Upcoming Health Maintenance Date Due  
 PAP AKA CERVICAL CYTOLOGY 9/24/2007 INFLUENZA AGE 9 TO ADULT 8/1/2017 DTaP/Tdap/Td series (3 - Td) 5/25/2026 Allergies as of 10/3/2017  Review Complete On: 9/5/2017 By: Giovanna Carmichael MD  
  
 Severity Noted Reaction Type Reactions Other Medication  01/24/2013   Side Effect Hives Allergic, to dust mites, molds Pollen Extracts  05/12/2015    Hives Current Immunizations  Reviewed on 5/31/2016 Name Date Influenza Vaccine 10/15/2015,  Deferred (Patient Refused) Influenza Vaccine Intradermal PF 1/16/2017 Pneumococcal Polysaccharide (PPSV-23) 10/15/2015 TDAP Vaccine 8/20/2011 Tdap 5/25/2016  9:21 PM  
  
 Not reviewed this visit You Were Diagnosed With   
  
 Codes Comments Essential hypertension    -  Primary ICD-10-CM: I10 
ICD-9-CM: 401.9 Cardiomyopathy, peripartum, postpartum     ICD-10-CM: O90.3 ICD-9-CM: 674.54   
 ICD (implantable cardioverter-defibrillator) in place     ICD-10-CM: Z95.810 ICD-9-CM: V45.02 Snoring     ICD-10-CM: R06.83 
ICD-9-CM: 786.09 Vitals BP Pulse Height(growth percentile) Weight(growth percentile) SpO2 BMI  
 112/75 96 5' 4\" (1.626 m) 182 lb 9.6 oz (82.8 kg) 100% 31.34 kg/m2 OB Status Smoking Status Having regular periods Never Smoker Vitals History BMI and BSA Data Body Mass Index Body Surface Area  
 31.34 kg/m 2 1.93 m 2 Preferred Pharmacy Pharmacy Name Phone [facility], [address] [facility] [phone] Your Updated Medication List  
  
   
This list is accurate as of: 10/3/17 10:38 AM.  Always use your most recent med list.  
  
  
  
  
 carvedilol 12.5 mg tablet Commonly known as:  Wilpaul Encarnacion Take 0.5 Tabs by mouth two (2) times daily (with meals). Indications: Chronic Heart Failure  
  
 chlorthalidone 25 mg tablet Commonly known as:  Yessi Bugler Take 0.5 Tabs by mouth daily. Indications: PERIPHERAL EDEMA DUE TO CHRONIC HEART FAILURE  
  
 COQ10 (UBIQUINOL) PO Take 100 mg by mouth two (2) times a day. fluticasone 50 mcg/actuation nasal spray Commonly known as:  Mai Gut 2 Sprays by Both Nostrils route daily. levocetirizine 5 mg tablet Commonly known as:  Debra Old Take 1 Tab by mouth daily. valsartan 40 mg tablet Commonly known as:  DIOVAN Take 1 Tab by mouth daily. Indications: Chronic Heart Failure VITAMIN D3 PO Take  by mouth daily. Introducing [facility]! Ziyad Ratliff introduces [employer]centric patient portal. Now you can access parts of your medical record, email your doctor's office, and request medication refills online. 1. In your internet browser, go to [email or web address] 2. Click on the First Time User? Click Here link in the Sign In box. You will see the New Member Sign Up page. 3. Enter your BrandMaker Access Code exactly as it appears below. You will not need to use this code after youve completed the sign-up process. If you do not sign up before the expiration date, you must request a new code. · BrandMaker Access Code: [email or web address] Expires: 1/1/2018 10:38 AM 
 
4. Enter the last four digits of your Social Security Number (xxxx) and Date of Birth (mm/dd/yyyy) as indicated and click Submit. You will be taken to the next sign-up page. 5. Create a BrandMaker ID. This will be your BrandMaker login ID and cannot be changed, so think of one that is secure and easy to remember. 6. Create a BrandMaker password. You can change your password at any time. 7. Enter your Password Reset Question and Answer. This can be used at a later time if you forget your password. 8. Enter your e-mail address. You will receive e-mail notification when new information is available in [address] 9. Click Sign Up. You can now view and download portions of your medical record. 10. Click the Download Summary menu link to download a portable copy of your medical information. If you have questions, please visit the Frequently Asked Questions section of the BrandMaker website. Remember, BrandMaker is NOT to be used for urgent needs. For medical emergencies, dial 911. Now available from your iPhone and Android! Please provide this summary of care documentation to your next provider. Your primary care clinician is listed as Nicol Hughes. If you have any questions after today's visit, please call [phone].

## 2017-10-09 DIAGNOSIS — I10 ESSENTIAL HYPERTENSION: ICD-10-CM

## 2017-10-09 DIAGNOSIS — Z95.810 ICD (IMPLANTABLE CARDIOVERTER-DEFIBRILLATOR) IN PLACE: ICD-10-CM

## 2017-10-09 RX ORDER — CARVEDILOL 12.5 MG/1
18.75 TABLET ORAL 2 TIMES DAILY WITH MEALS
Qty: 90 TAB | Refills: 3 | Status: SHIPPED | OUTPATIENT
Start: 2017-10-09 | End: 2017-11-10 | Stop reason: SDUPTHER

## 2017-11-10 ENCOUNTER — OFFICE VISIT (OUTPATIENT)
Dept: CARDIOLOGY CLINIC | Age: 32
End: 2017-11-10

## 2017-11-10 VITALS
DIASTOLIC BLOOD PRESSURE: 69 MMHG | HEART RATE: 92 BPM | BODY MASS INDEX: 31.58 KG/M2 | SYSTOLIC BLOOD PRESSURE: 110 MMHG | OXYGEN SATURATION: 100 % | HEIGHT: 64 IN | WEIGHT: 185 LBS

## 2017-11-10 DIAGNOSIS — Z95.810 ICD (IMPLANTABLE CARDIOVERTER-DEFIBRILLATOR) IN PLACE: ICD-10-CM

## 2017-11-10 DIAGNOSIS — R06.83 SNORING: ICD-10-CM

## 2017-11-10 DIAGNOSIS — I10 ESSENTIAL HYPERTENSION: Primary | ICD-10-CM

## 2017-11-10 RX ORDER — CARVEDILOL 25 MG/1
25 TABLET ORAL 2 TIMES DAILY WITH MEALS
Qty: 60 TAB | Refills: 6 | Status: SHIPPED | OUTPATIENT
Start: 2017-11-10 | End: 2018-06-23 | Stop reason: SDUPTHER

## 2017-11-10 NOTE — MR AVS SNAPSHOT
Visit Information Date & Time Provider Department Dept. Phone Encounter #  
 11/10/2017  9:30 AM Radha Kurtz MD [facility] at [facility]  Your Appointments 11/30/2017  8:45 AM  
PACEMAKER with PACEMAKER, [facility] [address]) Appt Note: 3mo bio scicd [id number] [facility]  
[phone] [facility]  
  
    
 11/30/2017  9:00 AM  
New Patient with Carla Downs MD  
[facility] [address]) Appt Note: Dr. Rojas Grad patient, [address] [facility]  
[phone] [address] [facility]  
  
    
  
 11/17/2017  8:00 AM  
REMOTE OFFICE VISIT with [facility] [address]) Appt Note: NOT AN OFFICE VISIT - BIOTRONIK ICD  
 [id number] [facility]  
[phone] [address] [facility] Upcoming Health Maintenance Date Due  
 PAP AKA CERVICAL CYTOLOGY 9/24/2007 Influenza Age 5 to Adult 8/1/2017 DTaP/Tdap/Td series (3 - Td) 5/25/2026 Allergies as of 11/10/2017  Review Complete On: 10/3/2017 By: Radha Kurtz MD  
  
 Severity Noted Reaction Type Reactions Other Medication  01/24/2013   Side Effect Hives Allergic, to dust mites, molds Pollen Extracts  05/12/2015    Hives Current Immunizations  Reviewed on 5/31/2016 Name Date Influenza Vaccine 10/15/2015,  Deferred (Patient Refused) Influenza Vaccine Intradermal PF 1/16/2017 Pneumococcal Polysaccharide (PPSV-23) 10/15/2015 TDAP Vaccine 8/20/2011 Tdap 5/25/2016  9:21 PM  
  
 Not reviewed this visit You Were Diagnosed With   
  
 Codes Comments Essential hypertension    -  Primary ICD-10-CM: I10 
ICD-9-CM: 401.9 Cardiomyopathy, peripartum, postpartum     ICD-10-CM: O90.3 ICD-9-CM: 674.54   
 ICD (implantable cardioverter-defibrillator) in place     ICD-10-CM: Z95.810 ICD-9-CM: V45.02 Snoring     ICD-10-CM: R06.83 
ICD-9-CM: 786.09 Vitals BP Pulse Height(growth percentile) Weight(growth percentile) SpO2 BMI  
 110/69 92 5' 4\" (1.626 m) 185 lb (83.9 kg) 100% 31.76 kg/m2 OB Status Smoking Status Having regular periods Never Smoker Vitals History BMI and BSA Data Body Mass Index Body Surface Area 31.76 kg/m 2 1.95 m 2 Preferred Pharmacy Pharmacy Name Phone [address] [facility], [address] [facility] [phone] Your Updated Medication List  
  
   
This list is accurate as of: 11/10/17 10:15 AM.  Always use your most recent med list.  
  
  
  
  
 carvedilol 12.5 mg tablet Commonly known as:  Jinx Re Take 1.5 Tabs by mouth two (2) times daily (with meals). Indications: Chronic Heart Failure  
  
 chlorthalidone 25 mg tablet Commonly known as:  Vonzella Gaytan Take 0.5 Tabs by mouth daily. Indications: PERIPHERAL EDEMA DUE TO CHRONIC HEART FAILURE  
  
 COQ10 (UBIQUINOL) PO Take 100 mg by mouth two (2) times a day. fluticasone 50 mcg/actuation nasal spray Commonly known as:  Med Hyman 2 Sprays by Both Nostrils route daily. levocetirizine 5 mg tablet Commonly known as:  Julia Nails Take 1 Tab by mouth daily. valsartan 40 mg tablet Commonly known as:  DIOVAN Take 1 Tab by mouth daily. Indications: Chronic Heart Failure VITAMIN D3 PO Take  by mouth daily. We Performed the Following AMB POC EKG ROUTINE W/ 12 LEADS, INTER & REP [39499 CPT(R)] Introducing [facility] & [facility]! Shaquille Briseno introduces [employer] patient portal. Now you can access parts of your medical record, email your doctor's office, and request medication refills online. 1. In your internet browser, go to [email or web address] 2. Click on the First Time User? Click Here link in the Sign In box. You will see the New Member Sign Up page. 3. Enter your Grid Net Access Code exactly as it appears below. You will not need to use this code after youve completed the sign-up process. If you do not sign up before the expiration date, you must request a new code. · Grid Net Access Code: [email or web address] Expires: 1/1/2018  9:38 AM 
 
4. Enter the last four digits of your Social Security Number (xxxx) and Date of Birth (mm/dd/yyyy) as indicated and click Submit. You will be taken to the next sign-up page. 5. Create a SoundCuret ID. This will be your Grid Net login ID and cannot be changed, so think of one that is secure and easy to remember. 6. Create a Grid Net password. You can change your password at any time. 7. Enter your Password Reset Question and Answer. This can be used at a later time if you forget your password. 8. Enter your e-mail address. You will receive e-mail notification when new information is available in [address] 9. Click Sign Up. You can now view and download portions of your medical record. 10. Click the Download Summary menu link to download a portable copy of your medical information. If you have questions, please visit the Frequently Asked Questions section of the Grid Net website. Remember, Grid Net is NOT to be used for urgent needs. For medical emergencies, dial 911. Now available from your iPhone and Android! Please provide this summary of care documentation to your next provider. Your primary care clinician is listed as Angelina Jeter. If you have any questions after today's visit, please call [phone].

## 2017-11-10 NOTE — PROGRESS NOTES
[facility]   [address] [facility], [address] [place] [address] Road                                          NEW PATIENT HPI/FOLLOW-UP      NAME:  David Caldwell   :   1985   MRN:   [id number]   PCP:  Alfonzo Torres MD           Subjective: The patient is a 33y.o. year old female  who returns for a routine follow-up. Since the last visit, patient reports no new symptoms. SBP and HR's better-- mmHg range and diastolics < 90. Denies change in exercise tolerance, chest pain, edema, medication intolerance, palpitations, shortness of breath, PND/orthopnea wheezing, sputum, syncope, dizziness or light headedness. Doing satisfactorily. Review of Systems  General: Pt denies excessive weight gain or loss. Pt is able to conduct ADL's. Respiratory: Denies shortness of breath, MOULTON, wheezing or stridor.   Cardiovascular: Denies precordial pain, palpitations, edema or PND  Gastrointestinal: Denies poor appetite, indigestion, abdominal pain or blood in stool  Peripheral vascular: Denies claudication, leg cramps  Neuropsychiatric: Denies paresthesias,tingling,numbness,anxiety,depression,fatigue  Musculoskeletal: Denies pain,tenderness, soreness,swelling      Past Medical History:   Diagnosis Date    AICD (automatic cardioverter/defibrillator) present     Anemia NEC     Cardiomyopathy ([facility] [address]     Contact dermatitis and other eczema, due to unspecified cause     hives    Hypertension      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    Vitamin D deficiency 2011    Snoring 2011      Past Surgical History:   Procedure Laterality Date    HX GYN      HX GYN  13        HX PACEMAKER      HX PACEMAKER  3/19/15    biotronic icd #[id number]     Allergies   Allergen Reactions    Other Medication Hives Allergic, to dust mites, molds    Pollen Extracts Hives      Family History   Problem Relation Age of Onset    Hypertension Mother     Hypertension Father     Diabetes Maternal Grandmother     Heart Disease Maternal Grandmother     Arthritis-osteo Maternal Grandfather     Asthma Brother     Arthritis-osteo Paternal Grandmother     Arthritis-osteo Paternal Grandfather       Social History     Social History    Marital status:      Spouse name: N/A    Number of children: 0    Years of education: N/A     Occupational History    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 Indications: Chronic Heart Failure    COQ10, UBIQUINOL, PO Take 100 mg by mouth two (2) times a day.  CHOLECALCIFEROL, VITAMIN D3, (VITAMIN D3 PO) Take  by mouth daily.  valsartan (DIOVAN) 40 mg tablet Take 1 Tab by mouth daily. Indications: Chronic Heart Failure    chlorthalidone (HYGROTEN) 25 mg tablet Take 0.5 Tabs by mouth daily. Indications: PERIPHERAL EDEMA DUE TO CHRONIC HEART FAILURE    levocetirizine (XYZAL) 5 mg tablet Take 1 Tab by mouth daily.  fluticasone (FLONASE) 50 mcg/actuation nasal spray 2 Sprays by Both Nostrils route daily. No current facility-administered medications for this visit. I have reviewed the nurses notes, vitals, problem list, allergy list, medical history, family medical, social history and medications.         Objective:     Physical Exam:     Vitals:    11/10/17 0948   BP: 110/69   Pulse: 92 SpO2: 100%   Weight: 185 lb (83.9 kg)   Height: 5' 4\" (1.626 m)    Body mass index is 31.76 kg/(m^2). General: Well developed, in no acute distress. HEENT: No carotid bruits, no JVD, trach is midline. Heart:  Normal S1/S2 negative S3 or S4. Regular, no murmur, gallop or rub.   Respiratory: Clear bilaterally, no wheezing or rales  Abdomen:   Soft, non-tender, bowel sounds are active.   Extremities:  No edema, normal cap refill, no cyanosis. Neuro: A&Ox3, speech clear, gait stable. Skin: Skin color is normal. No rashes or lesions. No diaphoresis.   Vascular: 2+ pulses symmetric in all extremities        Data Review:       Cardiographics:    EKG: NSR, minor non-specific Twave changes    Cardiology Labs:    Results for orders placed or performed during the hospital encounter of 09/16/15   EKG, 12 LEAD, INITIAL   Result Value Ref Range    Ventricular Rate 73 BPM    Atrial Rate 73 BPM    P-R Interval 138 ms    QRS Duration 82 ms    Q-T Interval 448 ms    QTC Calculation (Bezet) 493 ms    Calculated P Axis 51 degrees    Calculated R Axis 44 degrees    Calculated T Axis 2 degrees    Diagnosis       Normal sinus rhythm  Normal ekg  Confirmed by Linda Hdez ([id number]) on 9/17/2015 7:29:30 AM         Lab Results   Component Value Date/Time    Cholesterol, total 139 06/02/2016 08:19 AM    HDL Cholesterol 45 06/02/2016 08:19 AM    LDL, calculated 76 06/02/2016 08:19 AM    Triglyceride 89 06/02/2016 08:19 AM       Lab Results   Component Value Date/Time    Sodium 142 01/17/2017 02:05 PM    Potassium 4.1 01/17/2017 02:05 PM    Chloride 106 01/17/2017 02:05 PM    CO2 20 01/17/2017 02:05 PM    Anion gap 9 09/16/2015 08:29 PM    Glucose 105 01/17/2017 02:05 PM    BUN 11 01/17/2017 02:05 PM    Creatinine 0.80 01/17/2017 02:05 PM    BUN/Creatinine ratio 14 01/17/2017 02:05 PM    GFR est  01/17/2017 02:05 PM    GFR est non-AA 99 01/17/2017 02:05 PM    Calcium 8.8 01/17/2017 02:05 PM    Bilirubin, total 0.3 09/16/2015 08:29 PM    AST (SGOT) 15 09/16/2015 08:29 PM    Alk. phosphatase 57 09/16/2015 08:29 PM    Protein, total 7.9 09/16/2015 08:29 PM    Albumin 3.5 09/16/2015 08:29 PM    Globulin 4.4 09/16/2015 08:29 PM    A-G Ratio 0.8 09/16/2015 08:29 PM    ALT (SGPT) 18 09/16/2015 08:29 PM          Assessment:       ICD-10-CM ICD-9-CM    1. Essential hypertension I10 401.9 AMB POC EKG ROUTINE W/ 12 LEADS, INTER & REP   2. Cardiomyopathy, peripartum, postpartum O90.3 674.54 AMB POC EKG ROUTINE W/ 12 LEADS, INTER & REP   3. ICD (implantable cardioverter-defibrillator) in place Z95.810 V45.02 AMB POC EKG ROUTINE W/ 12 LEADS, INTER & REP   4. Snoring R06.83 786.09          Discussion: Patient presents at this time stable from a cardiac perspective. BP AT GOAL. HR in 80's primarily on Coreg 18.75 mg BID. Will maximize back to 25 mg BID and maintain Valsartan 40 mg and Chlorthalidone 25 mg every day for now. Continue to keep diary and call in 1-2 weeks with BP/HR results. Echo after total 3 months on readjusted meds which will be in 2 months. BMP/mag at this time as well. Pleased with present cardiac status. Plan: 1. Continue same meds. Lipid profile and labs followed by PCP. 2.Encouraged to exercise to tolerance, lose weight and follow low fat, low cholesterol, low sodium predominantly Plant-based (consider Mediterranean) diet. Call with questions or concerns. Will follow up any test results by phone and/or f/u here in office if needed. Evangelina Barrios 3.Follow up:2 months    I have discussed the diagnosis with the patient and the intended plan as seen in the above orders. The patient has received an after-visit summary and questions were answered concerning future plans. I have discussed any concerning medication side effects and warnings with the patient as well.     Juliane Rodriguez MD  11/10/2017

## 2017-11-17 ENCOUNTER — OFFICE VISIT (OUTPATIENT)
Dept: CARDIOLOGY CLINIC | Age: 32
End: 2017-11-17

## 2017-11-17 DIAGNOSIS — Z95.810 ICD (IMPLANTABLE CARDIOVERTER-DEFIBRILLATOR) IN PLACE: Primary | ICD-10-CM

## 2017-11-30 ENCOUNTER — CLINICAL SUPPORT (OUTPATIENT)
Dept: CARDIOLOGY CLINIC | Age: 32
End: 2017-11-30

## 2017-11-30 ENCOUNTER — OFFICE VISIT (OUTPATIENT)
Dept: CARDIOLOGY CLINIC | Age: 32
End: 2017-11-30

## 2017-11-30 VITALS
BODY MASS INDEX: 30.52 KG/M2 | WEIGHT: 178.8 LBS | RESPIRATION RATE: 16 BRPM | OXYGEN SATURATION: 98 % | HEIGHT: 64 IN | SYSTOLIC BLOOD PRESSURE: 110 MMHG | HEART RATE: 94 BPM | DIASTOLIC BLOOD PRESSURE: 68 MMHG

## 2017-11-30 DIAGNOSIS — Z95.810 ICD (IMPLANTABLE CARDIOVERTER-DEFIBRILLATOR) IN PLACE: ICD-10-CM

## 2017-11-30 DIAGNOSIS — E55.9 VITAMIN D DEFICIENCY: ICD-10-CM

## 2017-11-30 DIAGNOSIS — I10 ESSENTIAL HYPERTENSION: Primary | ICD-10-CM

## 2017-11-30 DIAGNOSIS — Z95.810 ICD (IMPLANTABLE CARDIOVERTER-DEFIBRILLATOR) IN PLACE: Primary | ICD-10-CM

## 2017-11-30 NOTE — PROGRESS NOTES
Subjective:      Fe Norris is a 33 y.o. female is here for EP consult and to establish care in device clinic. The patient denies chest pain/ shortness of breath, orthopnea, PND, LE edema, palpitations, syncope, presyncope or fatigue.        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    Vitamin D deficiency 2011    Snoring 2011      Nguyen Pantoja MD  Past Medical History:   Diagnosis Date    AICD (automatic cardioverter/defibrillator) present     Anemia NEC     Cardiomyopathy ([address]     Contact dermatitis and other eczema, due to unspecified cause     hives    Hypertension       Past Surgical History:   Procedure Laterality Date    HX GYN      HX GYN  13        HX PACEMAKER      HX PACEMAKER  3/19/15    biotronic icd #[id number]     Allergies   Allergen Reactions    Other Medication Hives     Allergic, to dust mites, molds    Pollen Extracts Hives      Family History   Problem Relation Age of Onset    Hypertension Mother     Hypertension Father     Diabetes Maternal Grandmother     Heart Disease Maternal Grandmother     Arthritis-osteo Maternal Grandfather     Asthma Brother     Arthritis-osteo Paternal Grandmother     Arthritis-osteo Paternal Grandfather     negative for cardiac disease  Social History     Social History    Marital status:      Spouse name: N/A    Number of children: 0    Years of education: N/A     Occupational History    penitentiary analyist      Unknown     Social History Main Topics    Smoking status: Never Smoker    Smokeless tobacco: Never Used    Alcohol use No    Drug use: No      Comment: denies     Sexual activity: Yes     Partners: Male     Birth control/ protection: Pill     Other Topics Concern    None     Social History Narrative    ** Merged History Encounter **          Current Outpatient Prescriptions   Medication Sig    carvedilol (COREG) 25 mg tablet Take 1 Tab by mouth two (2) times daily (with meals). Indications: Chronic Heart Failure    COQ10, UBIQUINOL, PO Take 100 mg by mouth two (2) times a day.  CHOLECALCIFEROL, VITAMIN D3, (VITAMIN D3 PO) Take  by mouth daily.  valsartan (DIOVAN) 40 mg tablet Take 1 Tab by mouth daily. Indications: Chronic Heart Failure    chlorthalidone (HYGROTEN) 25 mg tablet Take 0.5 Tabs by mouth daily. Indications: PERIPHERAL EDEMA DUE TO CHRONIC HEART FAILURE    levocetirizine (XYZAL) 5 mg tablet Take 1 Tab by mouth daily.  fluticasone (FLONASE) 50 mcg/actuation nasal spray 2 Sprays by Both Nostrils route daily. No current facility-administered medications for this visit. Vitals:    11/30/17 0851   BP: 110/68   Pulse: 94   Resp: 16   SpO2: 98%   Weight: 178 lb 12.8 oz (81.1 kg)   Height: 5' 4\" (1.626 m)       I have reviewed the nurses notes, vitals, problem list, allergy list, medical history, family, social history and medications. Review of Symptoms:    General: Pt denies excessive weight gain or loss. Pt is able to conduct ADL's  HEENT: Denies blurred vision, headaches, epistaxis and difficulty swallowing. Respiratory: Denies shortness of breath, MOULTON, wheezing or stridor. Cardiovascular: Denies precordial pain, palpitations, edema or PND  Gastrointestinal: Denies poor appetite, indigestion, abdominal pain or blood in stool  Urinary: Denies dysuria, pyuria  Musculoskeletal: Denies pain or swelling from muscles or joints  Neurologic: Denies tremor, paresthesias, or sensory motor disturbance  Skin: Denies rash, itching or texture change. Psych: Denies depression      Physical Exam:      General: Well developed, in no acute distress. HEENT: Eyes - PERRL, no jvd  Heart:  Normal S1/S2 negative S3 or S4.  Regular, no murmur, gallop or rub.   Respiratory: Clear bilaterally x 4, no wheezing or rales  Abdomen:   Soft, non-tender, bowel sounds are active.   Extremities:  No edema, normal cap refill, no cyanosis. Musculoskeletal: No clubbing  Neuro: A&Ox3, speech clear, gait stable. Skin: Skin color is normal. No rashes or lesions. Non diaphoretic  Vascular: 2+ pulses symmetric in all extremities    Cardiographics    Ekg: sinus rhythm   Biotronik ICD: 0% pacing, ST    Results for orders placed or performed during the hospital encounter of 09/16/15   EKG, 12 LEAD, INITIAL   Result Value Ref Range    Ventricular Rate 73 BPM    Atrial Rate 73 BPM    P-R Interval 138 ms    QRS Duration 82 ms    Q-T Interval 448 ms    QTC Calculation (Bezet) 493 ms    Calculated P Axis 51 degrees    Calculated R Axis 44 degrees    Calculated T Axis 2 degrees    Diagnosis       Normal sinus rhythm  Normal ekg  Confirmed by Lilliana Arenas ([id number]) on 9/17/2015 7:29:30 AM           Lab Results   Component Value Date/Time    WBC 9.5 09/16/2015 08:29 PM    HGB 12.2 09/16/2015 08:29 PM    HCT 36.4 09/16/2015 08:29 PM    PLATELET 840 19/05/1371 08:29 PM    MCV 92.9 09/16/2015 08:29 PM      Lab Results   Component Value Date/Time    Sodium 142 01/17/2017 02:05 PM    Potassium 4.1 01/17/2017 02:05 PM    Chloride 106 01/17/2017 02:05 PM    CO2 20 01/17/2017 02:05 PM    Anion gap 9 09/16/2015 08:29 PM    Glucose 105 01/17/2017 02:05 PM    BUN 11 01/17/2017 02:05 PM    Creatinine 0.80 01/17/2017 02:05 PM    BUN/Creatinine ratio 14 01/17/2017 02:05 PM    GFR est  01/17/2017 02:05 PM    GFR est non-AA 99 01/17/2017 02:05 PM    Calcium 8.8 01/17/2017 02:05 PM    Bilirubin, total 0.3 09/16/2015 08:29 PM    AST (SGOT) 15 09/16/2015 08:29 PM    Alk.  phosphatase 57 09/16/2015 08:29 PM    Protein, total 7.9 09/16/2015 08:29 PM    Albumin 3.5 09/16/2015 08:29 PM    Globulin 4.4 09/16/2015 08:29 PM    A-G Ratio 0.8 09/16/2015 08:29 PM    ALT (SGPT) 18 09/16/2015 08:29 PM         Assessment:     Assessment:        ICD-10-CM ICD-9-CM    1. Essential hypertension I10 401.9 AMB POC EKG ROUTINE W/ 12 LEADS, INTER & REP   2. Cardiomyopathy, peripartum, postpartum O90.3 674.54    3. ICD (implantable cardioverter-defibrillator) in place Z95.810 V45.02    4. Vitamin D deficiency E55.9 268.9      Orders Placed This Encounter    AMB POC EKG ROUTINE W/ 12 LEADS, INTER & REP     Order Specific Question:   Reason for Exam:     Answer:   routine        Plan:   Ms. Ilia Sofia is here to establish care for ICD/Cardiomyopathy management. Her ICD was placed in 2015 for post/iban partum cardiomyopathy. Device interrogation today demonstrates normal functioning with 0% pacing, 19 episodes of a sinus tachycardia (150bpm) which coincides with exertional activity as recorded on her fitbit. She denies cardiac complaints. Continue follow ups per device clinic and with Dr. Eriberto Reed annually. Continue medical management for HTN, cardiomyopathy. Thank you for allowing me to participate in Frieda Blackmon 's care.     Mackenzie Cagle NP

## 2017-11-30 NOTE — PROGRESS NOTES
1. Have you been to the ER, urgent care clinic since your last visit? Hospitalized since your last visit? No.      2. Have you seen or consulted any other health care providers outside of the [address] [place] [address] since your last visit? Include any pap smears or colon screening.      No.      Chief Complaint   Patient presents with    New Patient     device check-pt denies any cardiac symptoms

## 2018-01-08 ENCOUNTER — TELEPHONE (OUTPATIENT)
Dept: CARDIOLOGY CLINIC | Age: 33
End: 2018-01-08

## 2018-01-08 DIAGNOSIS — R06.83 SNORING: ICD-10-CM

## 2018-01-08 DIAGNOSIS — I50.22 SYSTOLIC CHF, CHRONIC (HCC): ICD-10-CM

## 2018-01-08 DIAGNOSIS — Z95.810 ICD (IMPLANTABLE CARDIOVERTER-DEFIBRILLATOR) IN PLACE: ICD-10-CM

## 2018-01-08 DIAGNOSIS — I10 ESSENTIAL HYPERTENSION: ICD-10-CM

## 2018-01-08 RX ORDER — VALSARTAN 40 MG/1
40 TABLET ORAL DAILY
Qty: 30 TAB | Refills: 11 | Status: SHIPPED | OUTPATIENT
Start: 2018-01-08 | End: 2018-01-31 | Stop reason: DRUGHIGH

## 2018-01-08 NOTE — TELEPHONE ENCOUNTER
spoke to pt . verified 2 pt id. pt requesting refill on valsartan pt states she lost her medication and is to soon to refill valsartan 40mg. pt requesting samples told pt i would send information to kosta MITCHELL to advise. Pt verbalized understanding.

## 2018-01-08 NOTE — TELEPHONE ENCOUNTER
Spoke to pt . Verified 2 pt id. Told pt per STEPHEN mccabe A new rx has been sent to her pharmacy  and to  at pharmacy as we have no samples to give her. If she runs into other problems, please let us know. Pt verbalized understanding.

## 2018-01-10 ENCOUNTER — OFFICE VISIT (OUTPATIENT)
Dept: CARDIOLOGY CLINIC | Age: 33
End: 2018-01-10

## 2018-01-10 VITALS
OXYGEN SATURATION: 99 % | WEIGHT: 181 LBS | DIASTOLIC BLOOD PRESSURE: 78 MMHG | HEIGHT: 64 IN | RESPIRATION RATE: 16 BRPM | HEART RATE: 97 BPM | SYSTOLIC BLOOD PRESSURE: 104 MMHG | BODY MASS INDEX: 30.9 KG/M2

## 2018-01-10 DIAGNOSIS — I10 ESSENTIAL HYPERTENSION: Primary | ICD-10-CM

## 2018-01-10 DIAGNOSIS — Z95.810 ICD (IMPLANTABLE CARDIOVERTER-DEFIBRILLATOR) IN PLACE: ICD-10-CM

## 2018-01-10 NOTE — MR AVS SNAPSHOT
Visit Information Date & Time Provider Department Dept. Phone Encounter #  
 1/10/2018  9:00 AM Alfredo Escalera MD [facility] [phone] [id number] 2/16/2018  8:00 AM  
REMOTE OFFICE VISIT with [facility] [address]) Appt Note: NOT AN OFFICE VISIT - REMOTE BIO ICD  
 [id number] [facility]  
[phone] [id number] [facility] Upcoming Health Maintenance Date Due  
 PAP AKA CERVICAL CYTOLOGY 9/24/2007 Influenza Age 5 to Adult 8/1/2017 DTaP/Tdap/Td series (3 - Td) 5/25/2026 Allergies as of 1/10/2018  Review Complete On: 1/10/2018 By: Glenda Hunter LPN Severity Noted Reaction Type Reactions Other Medication  01/24/2013   Side Effect Hives Allergic, to dust mites, molds Pollen Extracts  05/12/2015    Hives Current Immunizations  Reviewed on 5/31/2016 Name Date Influenza Vaccine 10/15/2015,  Deferred (Patient Refused) Influenza Vaccine Intradermal PF 1/16/2017 Pneumococcal Polysaccharide (PPSV-23) 10/15/2015 TDAP Vaccine 8/20/2011 Tdap 5/25/2016  9:21 PM  
  
 Not reviewed this visit You Were Diagnosed With   
  
 Codes Comments Essential hypertension    -  Primary ICD-10-CM: I10 
ICD-9-CM: 401.9 Cardiomyopathy, peripartum, postpartum     ICD-10-CM: O90.3 ICD-9-CM: 674.54   
 ICD (implantable cardioverter-defibrillator) in place     ICD-10-CM: Z95.810 ICD-9-CM: V45.02 Vitals BP Pulse Resp Height(growth percentile) Weight(growth percentile) SpO2  
 104/78 (BP 1 Location: Right arm, BP Patient Position: Sitting) 97 16 5' 4\" (1.626 m) 181 lb (82.1 kg) 99% BMI OB Status Smoking Status 31.07 kg/m2 Having regular periods Never Smoker Vitals History BMI and BSA Data Body Mass Index Body Surface Area  31.07 kg/m 2 1.93 m 2  
  
  
 Preferred Pharmacy Pharmacy Name Phone [phone] [facility] [phone] Your Updated Medication List  
  
   
This list is accurate as of: 1/10/18 10:10 AM.  Always use your most recent med list.  
  
  
  
  
 carvedilol 25 mg tablet Commonly known as:  Gwendolyn Sham Take 1 Tab by mouth two (2) times daily (with meals). Indications: Chronic Heart Failure  
  
 chlorthalidone 25 mg tablet Commonly known as:  Tamara Edvin Take 0.5 Tabs by mouth daily. Indications: PERIPHERAL EDEMA DUE TO CHRONIC HEART FAILURE  
  
 COQ10 (UBIQUINOL) PO Take 100 mg by mouth two (2) times a day. fluticasone 50 mcg/actuation nasal spray Commonly known as:  Marlaine Romance 2 Sprays by Both Nostrils route daily. levocetirizine 5 mg tablet Commonly known as:  Marcellina Clamp Take 1 Tab by mouth daily. valsartan 40 mg tablet Commonly known as:  DIOVAN Take 1 Tab by mouth daily. Indications: Chronic Heart Failure VITAMIN D3 PO Take  by mouth daily. We Performed the Following MAGNESIUM [id number] CPT(R)] METABOLIC PANEL, BASIC [66205 CPT(R)] REFERRAL TO HEART FAILURE CLINIC [RTZ117 Custom] Comments:  
 Please evaluate patient for hypotension in setting of dilated cardiomyopathy. Repeat echo ordered. To-Do List   
 01/10/2018 ECHO:  2D ECHO COMPLETE ADULT (TTE) W OR WO CONTR Referral Information Referral ID Referred By Referred To  
  
 [id number] Loi Orourke MD   
   [address] [place] [address] Phone: [phone] Fax: [phone] Visits Status Start Date End Date 1 New Request 1/10/18 1/10/19 If your referral has a status of pending review or denied, additional information will be sent to support the outcome of this decision. Patient Instructions -- Please continue all your current medications -- We will update labs and echo and make adjustments to meds if needed, based on these results -- Please get echo schedule and labs drawn in the next week 
-- We will call with results -- Referral to Dr. Paul Stallworth 
 
--1 month follow up visit Dilated Cardiomyopathy: Care Instructions Your Care Instructions Dilated cardiomyopathy is a condition that weakens your heart muscle and causes it to stretch, or dilate. When your heart muscle is weak, it can't pump out blood as well as it should. More blood stays in your heart after each heartbeat. As more blood fills and stays in the heart, the heart muscle stretches even more and gets even weaker. Many things can cause dilated cardiomyopathy. It can be caused by another disease or condition, such as high blood pressure or a heart attack. Some people have a family history of dilated cardiomyopathy. For some people, the cause is not known. You may not have any symptoms at first. Or you may have mild symptoms, such as feeling very tired or weak. If your heart gets weaker, you may develop heart failure. Heart failure means that your heart muscle doesn't pump as much blood as your body needs. If this happens, you will feel other symptoms such as shortness of breath or trouble breathing when you lie down. The goal of treatment is to slow the disease and help you feel better. You may also have treatment for the cause of the cardiomyopathy. You will probably take a few medicines. If your doctor thinks it will help your heart and prevent problems, you may get a device such as a pacemaker. Self-care is another important part of your treatment. It includes the things you can do every day to feel better and stay as healthy as possible. Follow-up care is a key part of your treatment and safety. Be sure to make and go to all appointments, and call your doctor if you are having problems.  It's also a good idea to know your test results and keep a list of the medicines you take. How can you care for yourself at home? Medicines ? · Be safe with medicines. Take your medicines exactly as prescribed. Call your doctor if you think you are having a problem with your medicine. You may be taking some of the following medicines: ¨ Angiotensin-converting enzyme (ACE) inhibitors or angiotensin II receptor blockers (ARBs). These make it easier for blood to flow. ¨ Diuretics. These help remove excess fluid from the body. ¨ Beta-blockers. These slow the heart rate and can help the heart fill with blood more completely. ? Heart-healthy lifestyle ? · Be active. Exercise regularly, but don't exercise too hard. If you aren't already active, your doctor may want you to start exercising. But don't start until you have talked with your doctor to make an exercise program that is safe for you. ? · Do not smoke. Smoking can make a heart condition worse. If you need help quitting, talk to your doctor about stop-smoking programs and medicines. These can increase your chances of quitting for good. ? · Eat a heart-healthy diet. ? · Stay at a healthy weight. Lose weight if you need to.  
? · If your doctor recommends it, limit sodium. This helps keep fluid from building up in your body. It may help you feel better. ? Weight monitoring ? · Weigh yourself without clothing at the same time each day. Record your weight. Call your doctor if you have a sudden weight gain, such as more than 2 to 3 pounds in a day or 5 pounds in a week. (Your doctor may suggest a different range of weight gain.) A sudden weight gain may mean that your condition is getting worse. When should you call for help? Call 911 anytime you think you may need emergency care. For example, call if: 
? · You have symptoms of sudden heart failure. These may include: ¨ Severe trouble breathing. ¨ A fast or irregular heartbeat. ¨ Coughing up pink, foamy mucus. ¨ Passing out. ?Call your doctor now or seek immediate medical care if: 
? · You have new or changed symptoms of heart failure, such as: ¨ New or increased shortness of breath. ¨ New or worse swelling in your legs, ankles, or feet. ¨ Sudden weight gain, such as more than 2 to 3 pounds in a day or 5 pounds in a week. (Your doctor may suggest a different range of weight gain.) ¨ Feeling dizzy or lightheaded or like you may faint. ¨ Feeling so tired or weak that you cannot do your usual activities. ¨ Not sleeping well. Shortness of breath wakes you at night. You need extra pillows to prop yourself up to breathe easier. ? Watch closely for changes in your health, and be sure to contact your doctor if you have any problems. Where can you learn more? Go to http://desiree-kinga.info/. Enter B164 in the search box to learn more about \"Dilated Cardiomyopathy: Care Instructions. \" Current as of: September 21, 2016 Content Version: 11.4 © 3702-7004 [employer]. Care instructions adapted under license by [employer] (which disclaims liability or warranty for this information). If you have questions about a medical condition or this instruction, always ask your healthcare professional. [address] any warranty or liability for your use of this information. Introducing [facility]! [facility] introduces Up[employer]ique patient portal. Now you can access parts of your medical record, email your doctor's office, and request medication refills online. 1. In your internet browser, go to [email or web address] 2. Click on the First Time User? Click Here link in the Sign In box. You will see the New Member Sign Up page. 3. Enter your Upclique Access Code exactly as it appears below. You will not need to use this code after youve completed the sign-up process. If you do not sign up before the expiration date, you must request a new code. · Repligen Access Code: [email or web address] Expires: 4/10/2018 10:05 AM 
 
4. Enter the last four digits of your Social Security Number (xxxx) and Date of Birth (mm/dd/yyyy) as indicated and click Submit. You will be taken to the next sign-up page. 5. Create a Repligen ID. This will be your Repligen login ID and cannot be changed, so think of one that is secure and easy to remember. 6. Create a Repligen password. You can change your password at any time. 7. Enter your Password Reset Question and Answer. This can be used at a later time if you forget your password. 8. Enter your e-mail address. You will receive e-mail notification when new information is available in [address] 9. Click Sign Up. You can now view and download portions of your medical record. 10. Click the Download Summary menu link to download a portable copy of your medical information. If you have questions, please visit the Frequently Asked Questions section of the Re[employer] website. Remember, Repligen is NOT to be used for urgent needs. For medical emergencies, dial 911. Now available from your iPhone and Android! Please provide this summary of care documentation to your next provider. Your primary care clinician is listed as [facility]. If you have any questions after today's visit, please call [phone].

## 2018-01-10 NOTE — PROGRESS NOTES
[facility]   [address] [facility], [address] [place] [address]                                          NEW PATIENT HPI/FOLLOW-UP      NAME:  Kimberly Meadows   :   1985   MRN:   [id number]   PCP:  Garrett Noriega MD           Subjective: The patient is a 33y.o. year old female with h/o peripartum cardiomyopathy, s/p ICD, and HTN who returns for a routine follow-up. Since the last visit, patient reports no new symptoms. Is tolerating the Coreg at 25 mg BID. Is currently w/o valsartan as she has not picked up the prescription sent 2 days ago. Denies change in exercise tolerance, chest pain, edema, medication intolerance, palpitations, shortness of breath, PND/orthopnea wheezing, sputum, syncope, dizziness or light headedness. Doing satisfactorily. Is asking whether it is safe to continue the fluid pill even when she is not swelling. Review of Systems  General: Pt denies excessive weight gain or loss. Pt is able to conduct ADL's. Respiratory: Denies shortness of breath, MOULTON, wheezing or stridor.   Cardiovascular: Denies precordial pain, palpitations, edema or PND  Gastrointestinal: Denies poor appetite, indigestion, abdominal pain or blood in stool  Peripheral vascular: Denies claudication, leg cramps  Neuropsychiatric: Denies paresthesias,tingling,numbness,anxiety,depression,fatigue  Musculoskeletal: Denies pain,tenderness, soreness,swelling      Past Medical History:   Diagnosis Date    AICD (automatic cardioverter/defibrillator) present     Anemia NEC     Cardiomyopathy ([facility] [address]     Contact dermatitis and other eczema, due to unspecified cause     hives    Hypertension      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    Vitamin D deficiency 2011    Snoring 2011      Past Surgical History:   Procedure Laterality Date    HX GYN      HX GYN  13        HX PACEMAKER      HX PACEMAKER  3/19/15    biotronic icd #[id number]     Allergies   Allergen Reactions    Other Medication Hives     Allergic, to dust mites, molds    Pollen Extracts Hives      Family History   Problem Relation Age of Onset    Hypertension Mother     Hypertension Father     Diabetes Maternal Grandmother     Heart Disease Maternal Grandmother     Arthritis-osteo Maternal Grandfather     Asthma Brother     Arthritis-osteo Paternal Grandmother     Arthritis-osteo Paternal Grandfather       Social History     Social History    Marital status:      Spouse name: N/A    Number of children: 0    Years of education: N/A     Occupational History    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 Indications: Chronic Heart Failure    carvedilol (COREG) 25 mg tablet Take 1 Tab by mouth two (2) times daily (with meals). Indications: Chronic Heart Failure    COQ10, UBIQUINOL, PO Take 100 mg by mouth two (2) times a day.  CHOLECALCIFEROL, VITAMIN D3, (VITAMIN D3 PO) Take  by mouth daily.  chlorthalidone (HYGROTEN) 25 mg tablet Take 0.5 Tabs by mouth daily. Indications: PERIPHERAL EDEMA DUE TO CHRONIC HEART FAILURE    levocetirizine (XYZAL) 5 mg tablet Take 1 Tab by mouth daily.  fluticasone (FLONASE) 50 mcg/actuation nasal spray 2 Sprays by Both Nostrils route daily. No current facility-administered medications for this visit.          I have reviewed the nurses notes, vitals, problem list, allergy list, medical history, family medical, social history and medications. Objective:     Physical Exam:     Vitals:    01/10/18 0922 01/10/18 0925   BP: 102/72 104/78   Pulse: 97    Resp: 16    SpO2: 99%    Weight: 181 lb (82.1 kg)    Height: 5' 4\" (1.626 m)     Body mass index is 31.07 kg/(m^2). General: WDWN adult female, in no acute distress. Reserved affect. HEENT: No carotid bruits, no JVD, trach is midline. Heart:  Normal S1/S2 negative S3 or S4. Regular, no murmur, gallop or rub.   Respiratory: Clear bilaterally, no wheezing or rales  Abdomen:   Soft, non-tender, bowel sounds are active.   Extremities:  No edema, normal cap refill, no cyanosis. Neuro: A&Ox3, speech clear, gait stable. Skin: Skin color is normal. No rashes or lesions. No diaphoresis.   Vascular: 2+ pulses symmetric in all extremities        Data Review:       Cardiographics:    EKG interpretation:  None performed today      Cardiology Labs:    Results for orders placed or performed during the hospital encounter of 09/16/15   EKG, 12 LEAD, INITIAL   Result Value Ref Range    Ventricular Rate 73 BPM    Atrial Rate 73 BPM    P-R Interval 138 ms    QRS Duration 82 ms    Q-T Interval 448 ms    QTC Calculation (Bezet) 493 ms    Calculated P Axis 51 degrees    Calculated R Axis 44 degrees    Calculated T Axis 2 degrees    Diagnosis       Normal sinus rhythm  Normal ekg  Confirmed by Gerda Klein ([id number]) on 9/17/2015 7:29:30 AM         Lab Results   Component Value Date/Time    Cholesterol, total 139 06/02/2016 08:19 AM    HDL Cholesterol 45 06/02/2016 08:19 AM    LDL, calculated 76 06/02/2016 08:19 AM    Triglyceride 89 06/02/2016 08:19 AM       Lab Results   Component Value Date/Time    Sodium 142 01/17/2017 02:05 PM    Potassium 4.1 01/17/2017 02:05 PM    Chloride 106 01/17/2017 02:05 PM    CO2 20 01/17/2017 02:05 PM    Anion gap 9 09/16/2015 08:29 PM    Glucose 105 01/17/2017 02:05 PM BUN 11 01/17/2017 02:05 PM    Creatinine 0.80 01/17/2017 02:05 PM    BUN/Creatinine ratio 14 01/17/2017 02:05 PM    GFR est  01/17/2017 02:05 PM    GFR est non-AA 99 01/17/2017 02:05 PM    Calcium 8.8 01/17/2017 02:05 PM    Bilirubin, total 0.3 09/16/2015 08:29 PM    AST (SGOT) 15 09/16/2015 08:29 PM    Alk. phosphatase 57 09/16/2015 08:29 PM    Protein, total 7.9 09/16/2015 08:29 PM    Albumin 3.5 09/16/2015 08:29 PM    Globulin 4.4 09/16/2015 08:29 PM    A-G Ratio 0.8 09/16/2015 08:29 PM    ALT (SGPT) 18 09/16/2015 08:29 PM          Assessment:       ICD-10-CM ICD-9-CM    1. Essential hypertension I10 401.9    2. Cardiomyopathy, peripartum, postpartum O90.3 674.54    3. ICD (implantable cardioverter-defibrillator) in place Z95.810 V45.02          Discussion: Patient presents at this time stable from a cardiac perspective. BP was well controlled without Valsartan. Question medication compliance; pt may be adjusting/withholding meds on her own. HR in 90s - higher than would like. BP in low range of comfort. Needs updated labs and echo to help determine next steps. Cautious about present status. Discussed/seen with Dr. Trisha Montano:     -- Please continue all your current medications  -- We will update labs and echo and make adjustments to meds if needed, based on these results    -- Please get echo schedule and labs drawn in the next week  -- Consider referral to [facility]    -- We will call with results    Follow up: 1 month   --  Call with questions or concerns. -- Will follow up any test results by phone and/or f/u here in office if needed. .        I have discussed the diagnosis with the patient and the intended plan as seen in the above orders. The patient has received an after-visit summary and questions were answered concerning future plans. I have discussed any concerning medication side effects and warnings with the patient as well. Patient seen and examined.  All pertinent data reviewed. I have reviewed detailed note as outlined by [facility]. Case discussed with Nursing/medical assistant staff and [employer]. Patient difficult to evaluate. Suspicious may not be taking meds as prescribed though states she is. In past stopped taking meds on her own when LVEF in 55-60 range. Figured she did not need for post-partum CM. Eventually LVEF receded back down into 40's and meds resumed. Concerned HR in 90's and BP in low 100's. Will repeat echo and believe it would be appropriate to refer to [facility] for opinion and care. Believe would benefit from [facility]. Has had trouble with hypotension in past. Does not have PCP at present. Will refer to Dr. Yosvany Huerta. Plans as outlined.     Toma Ca PA-C  1/10/2018     ADELFO PRADO MD

## 2018-01-10 NOTE — PATIENT INSTRUCTIONS
-- Please continue all your current medications  -- We will update labs and echo and make adjustments to meds if needed, based on these results    -- Please get echo schedule and labs drawn in the next week  -- We will call with results    -- Referral to Dr. Huma Ren    --1 month follow up visit      Dilated Cardiomyopathy: Care Instructions  Your Care Instructions    Dilated cardiomyopathy is a condition that weakens your heart muscle and causes it to stretch, or dilate. When your heart muscle is weak, it can't pump out blood as well as it should. More blood stays in your heart after each heartbeat. As more blood fills and stays in the heart, the heart muscle stretches even more and gets even weaker. Many things can cause dilated cardiomyopathy. It can be caused by another disease or condition, such as high blood pressure or a heart attack. Some people have a family history of dilated cardiomyopathy. For some people, the cause is not known. You may not have any symptoms at first. Or you may have mild symptoms, such as feeling very tired or weak. If your heart gets weaker, you may develop heart failure. Heart failure means that your heart muscle doesn't pump as much blood as your body needs. If this happens, you will feel other symptoms such as shortness of breath or trouble breathing when you lie down. The goal of treatment is to slow the disease and help you feel better. You may also have treatment for the cause of the cardiomyopathy. You will probably take a few medicines. If your doctor thinks it will help your heart and prevent problems, you may get a device such as a pacemaker. Self-care is another important part of your treatment. It includes the things you can do every day to feel better and stay as healthy as possible. Follow-up care is a key part of your treatment and safety. Be sure to make and go to all appointments, and call your doctor if you are having problems.  It's also a good idea to know your test results and keep a list of the medicines you take. How can you care for yourself at home? Medicines  ? · Be safe with medicines. Take your medicines exactly as prescribed. Call your doctor if you think you are having a problem with your medicine. You may be taking some of the following medicines:  ¨ Angiotensin-converting enzyme (ACE) inhibitors or angiotensin II receptor blockers (ARBs). These make it easier for blood to flow. ¨ Diuretics. These help remove excess fluid from the body. ¨ Beta-blockers. These slow the heart rate and can help the heart fill with blood more completely. ? Heart-healthy lifestyle  ? · Be active. Exercise regularly, but don't exercise too hard. If you aren't already active, your doctor may want you to start exercising. But don't start until you have talked with your doctor to make an exercise program that is safe for you. ? · Do not smoke. Smoking can make a heart condition worse. If you need help quitting, talk to your doctor about stop-smoking programs and medicines. These can increase your chances of quitting for good. ? · Eat a heart-healthy diet. ? · Stay at a healthy weight. Lose weight if you need to.   ? · If your doctor recommends it, limit sodium. This helps keep fluid from building up in your body. It may help you feel better. ? Weight monitoring  ? · Weigh yourself without clothing at the same time each day. Record your weight. Call your doctor if you have a sudden weight gain, such as more than 2 to 3 pounds in a day or 5 pounds in a week. (Your doctor may suggest a different range of weight gain.) A sudden weight gain may mean that your condition is getting worse. When should you call for help? Call 911 anytime you think you may need emergency care. For example, call if:  ? · You have symptoms of sudden heart failure. These may include:  ¨ Severe trouble breathing. ¨ A fast or irregular heartbeat. ¨ Coughing up pink, foamy mucus. ¨ Passing out.    ?Call your doctor now or seek immediate medical care if:  ? · You have new or changed symptoms of heart failure, such as:  ¨ New or increased shortness of breath. ¨ New or worse swelling in your legs, ankles, or feet. ¨ Sudden weight gain, such as more than 2 to 3 pounds in a day or 5 pounds in a week. (Your doctor may suggest a different range of weight gain.)  ¨ Feeling dizzy or lightheaded or like you may faint. ¨ Feeling so tired or weak that you cannot do your usual activities. ¨ Not sleeping well. Shortness of breath wakes you at night. You need extra pillows to prop yourself up to breathe easier. ? Watch closely for changes in your health, and be sure to contact your doctor if you have any problems. Where can you learn more? Go to http://desiree-kinga.info/. Enter B164 in the search box to learn more about \"Dilated Cardiomyopathy: Care Instructions. \"  Current as of: September 21, 2016  Content Version: 11.4  © 8704-5369 Healthwise, Incorporated. Care instructions adapted under license by [employer] (which disclaims liability or warranty for this information). If you have questions about a medical condition or this instruction, always ask your healthcare professional. [address] any warranty or liability for your use of this information.

## 2018-01-10 NOTE — PROGRESS NOTES
Chief Complaint   Patient presents with   [facility].     pt has no cardiac complaints. 1. Have you been to the ER, urgent care clinic since your last visit? Hospitalized since your last visit? No    2. Have you seen or consulted any other health care providers outside of the [facility] since your last visit? Include any pap smears or colon screening.  No

## 2018-01-11 LAB
BUN SERPL-MCNC: 18 MG/DL (ref 6–20)
BUN/CREAT SERPL: 18 (ref 9–23)
CALCIUM SERPL-MCNC: 9.1 MG/DL (ref 8.7–10.2)
CHLORIDE SERPL-SCNC: 99 MMOL/L (ref 96–106)
CO2 SERPL-SCNC: 24 MMOL/L (ref 18–29)
CREAT SERPL-MCNC: 1 MG/DL (ref 0.57–1)
GLUCOSE SERPL-MCNC: 85 MG/DL (ref 65–99)
MAGNESIUM SERPL-MCNC: 2.1 MG/DL (ref 1.6–2.3)
POTASSIUM SERPL-SCNC: 3.8 MMOL/L (ref 3.5–5.2)
SODIUM SERPL-SCNC: 139 MMOL/L (ref 134–144)

## 2018-01-15 ENCOUNTER — HOSPITAL ENCOUNTER (OUTPATIENT)
Dept: NON INVASIVE DIAGNOSTICS | Age: 33
Discharge: HOME | End: 2018-01-15
Attending: PHYSICIAN ASSISTANT
Payer: COMMERCIAL

## 2018-01-15 PROCEDURE — 93306 TTE W/DOPPLER COMPLETE: CPT

## 2018-01-16 NOTE — PROGRESS NOTES
Spoke to pt. Verified 2 id. Told pt per kosta MITCHELL , her echo results are the following:Her left ventricle is large and the muscle wall is thin. This is why she has the ICD. Her heart function is intermediate at 45%. continue medications per Dr. Steven Olmedo orders and please do not adjust them on your own. follow up with Dr. Ember Mercado as planned. Pt verbalized understanding and states she would like a copy of results mailed to her home. Copy was sent out today.

## 2018-01-16 NOTE — PROGRESS NOTES
Spoke to pt . Verified 2 id told pt lab normal for blood work of metabolic panel per Kenya MITCHELL. Pt verbalized understanding.

## 2018-01-19 ENCOUNTER — TELEPHONE (OUTPATIENT)
Dept: CARDIOLOGY CLINIC | Age: 33
End: 2018-01-19

## 2018-01-19 NOTE — TELEPHONE ENCOUNTER
Spoke with patient regarding her schedule appointment.    Since patient needs to see Dr. Wray Mask have rescheduled appointment to Wednesday January 31st at 11am.

## 2018-01-31 ENCOUNTER — OFFICE VISIT (OUTPATIENT)
Dept: CARDIOLOGY CLINIC | Age: 33
End: 2018-01-31

## 2018-01-31 VITALS
DIASTOLIC BLOOD PRESSURE: 58 MMHG | HEIGHT: 64 IN | OXYGEN SATURATION: 98 % | BODY MASS INDEX: 30.35 KG/M2 | SYSTOLIC BLOOD PRESSURE: 92 MMHG | RESPIRATION RATE: 20 BRPM | WEIGHT: 177.8 LBS | HEART RATE: 92 BPM | TEMPERATURE: 98.3 F

## 2018-01-31 DIAGNOSIS — Z95.810 ICD (IMPLANTABLE CARDIOVERTER-DEFIBRILLATOR) IN PLACE: ICD-10-CM

## 2018-01-31 DIAGNOSIS — I50.22 SYSTOLIC CHF, CHRONIC (HCC): ICD-10-CM

## 2018-01-31 DIAGNOSIS — I10 ESSENTIAL HYPERTENSION: ICD-10-CM

## 2018-01-31 DIAGNOSIS — R06.83 SNORING: ICD-10-CM

## 2018-01-31 RX ORDER — VALSARTAN 80 MG/1
80 TABLET ORAL DAILY
Qty: 90 TAB | Refills: 3 | Status: SHIPPED | OUTPATIENT
Start: 2018-01-31 | End: 2018-01-31 | Stop reason: SDUPTHER

## 2018-01-31 RX ORDER — VALSARTAN 80 MG/1
80 TABLET ORAL DAILY
Qty: 30 TAB | Refills: 11 | Status: SHIPPED | OUTPATIENT
Start: 2018-01-31 | End: 2018-03-29 | Stop reason: SDUPTHER

## 2018-01-31 NOTE — MR AVS SNAPSHOT
[address] [facility] 
 
 
 [address] [facility] [address] [place] [address] 
[phone] Patient: Gerard Rosas MRN: [id number] MNI:[id number] Visit Information Date & Time Provider Department Dept. Phone Encounter #  
 1/31/2018 11:30 AM Sid Richter 3. [phone] [id number] Follow-up Instructions Return in about 2 months (around 3/31/2018). Your Appointments 2/13/2018  9:00 AM  
ESTABLISHED PATIENT with Disha Barney MD  
[facility]) Appt Note: 1 MO. F/U FOLLOWING LABS AND ECHO TEST  
 [address] [address] [facility]  
[phone] [address]  
  
    
  
 2/16/2018  8:00 AM  
REMOTE OFFICE VISIT with [facility] [address] [facility]) Appt Note: NOT AN OFFICE VISIT - REMOTE BIO ICD  
 [address]  
[phone] [address] Upcoming Health Maintenance Date Due  
 PAP AKA CERVICAL CYTOLOGY 9/24/2007 Influenza Age 5 to Adult 8/1/2017 DTaP/Tdap/Td series (3 - Td) 5/25/2026 Allergies as of 1/31/2018  Review Complete On: 1/31/2018 By: Celso Paredes MD  
  
 Severity Noted Reaction Type Reactions Other Medication  01/24/2013   Side Effect Hives Allergic, to dust mites, molds Pollen Extracts  05/12/2015    Hives Current Immunizations  Reviewed on 5/31/2016 Name Date Influenza Vaccine 10/15/2015,  Deferred (Patient Refused) Influenza Vaccine Intradermal PF 1/16/2017 Pneumococcal Polysaccharide (PPSV-23) 10/15/2015 TDAP Vaccine 8/20/2011 Tdap 5/25/2016  9:21 PM  
  
 Not reviewed this visit You Were Diagnosed With   
  
 Codes Comments Cardiomyopathy, peripartum, postpartum     ICD-10-CM: O90.3 ICD-9-CM: 674.54   
 Essential hypertension     ICD-10-CM: I10 
ICD-9-CM: 401.9 ICD (implantable cardioverter-defibrillator) in place     ICD-10-CM: Z95.810 ICD-9-CM: V45.02 Snoring     ICD-10-CM: R06.83 
ICD-9-CM: 786.09 Systolic CHF, chronic (HCC)     ICD-10-CM: I50.22 ICD-9-CM: 428.22, 428.0 EF 40-45% Vitals BP Pulse Temp Resp Height(growth percentile) Weight(growth percentile) 92/58 (BP 1 Location: Right arm, BP Patient Position: Sitting) 92 98.3 °F (36.8 °C) (Oral) 20 5' 4\" (1.626 m) 177 lb 12.8 oz (80.6 kg) SpO2 BMI OB Status Smoking Status 98% 30.52 kg/m2 Having regular periods Never Smoker Vitals History BMI and BSA Data Body Mass Index Body Surface Area 30.52 kg/m 2 1.91 m 2 [facility] [address] [facility] [phone] Your Updated Medication List  
  
   
This list is accurate as of: 1/31/18  1:00 PM.  Always use your most recent med list.  
  
  
  
  
 carvedilol 25 mg tablet Commonly known as:  Sai Ordonez Take 1 Tab by mouth two (2) times daily (with meals). Indications: Chronic Heart Failure COQ10 (UBIQUINOL) PO Take 100 mg by mouth two (2) times a day. fluticasone 50 mcg/actuation nasal spray Commonly known as:  Yanira Paget 2 Sprays by Both Nostrils route daily. levocetirizine 5 mg tablet Commonly known as:  Golden Marine Take 1 Tab by mouth daily. valsartan 80 mg tablet Commonly known as:  DIOVAN Take 1 Tab by mouth daily. VITAMIN D3 PO Take  by mouth daily. Prescriptions Sent to Pharmacy Refills  
 valsartan (DIOVAN) 80 mg tablet 11 Sig: Take 1 Tab by mouth daily. Class: Normal  
 Pharmacy: [facility] [address] [facility] [place] [address] RD AT [address] [facility] Ph #: [phone] Route: Oral  
  
Follow-up Instructions Return in about 2 months (around 3/31/2018). Patient Instructions 1. Discontinue chlorthalidone 2. Increase valsartan to 80 mg daily - take at bedtime 3. Continue carvedilol 25 mg twice daily - take after meals 4. Follow up with Dr. Carol Tsai in February 5. Follow up with [facility] in 2 months Introducing [facility]! Sonny Tamayo introduces [employer] patient portal. Now you can access parts of your medical record, email your doctor's office, and request medication refills online. 1. In your internet browser, go to [email or web address] 2. Click on the First Time User? Click Here link in the Sign In box. You will see the New Member Sign Up page. 3. Enter your [employer] Access Code exactly as it appears below. You will not need to use this code after youve completed the sign-up process. If you do not sign up before the expiration date, you must request a new code. · [employer] Access Code: [email or web address] Expires: 4/10/2018 10:05 AM 
 
4. Enter the last four digits of your Social Security Number (xxxx) and Date of Birth (mm/dd/yyyy) as indicated and click Submit. You will be taken to the next sign-up page. 5. Create a [employer] ID. This will be your [employer] login ID and cannot be changed, so think of one that is secure and easy to remember. 6. Create a [employer] password. You can change your password at any time. 7. Enter your Password Reset Question and Answer. This can be used at a later time if you forget your password. 8. Enter your e-mail address. You will receive e-mail notification when new information is available in [address] 9. Click Sign Up. You can now view and download portions of your medical record. 10. Click the Download Summary menu link to download a portable copy of your medical information. If you have questions, please visit the Frequently Asked Questions section of the [employer] website.  Remember, [employer] is NOT to be used for urgent needs. For medical emergencies, dial 911. Now available from your iPhone and Android! Please provide this summary of care documentation to your next provider. Your primary care clinician is listed as NONE. If you have any questions after today's visit, please call [phone].

## 2018-01-31 NOTE — PATIENT INSTRUCTIONS
1. Discontinue chlorthalidone  2. Increase valsartan to 80 mg daily - take at bedtime  3. Continue carvedilol 25 mg twice daily - take after meals  4. Follow up with Dr. Leigh in February  5.  Follow up with [facility] in 2 months

## 2018-01-31 NOTE — PROGRESS NOTES
[facility] Consultation Note      DOS:   2018  NAME:  Anaya Wagner    MRN:   [id number]   REFERRING PROVIDER:  Vernell Blum MD  PRIMARY CARE PHYSICIAN: None  PRIMARY CARDIOLOGIST: Vernell Blum MD      Chief Complaint:   Chief Complaint   Patient presents with    Follow-up       HPI: 33y.o. year old female with a history of PPCM who presents for further evaluation of her chronic systolic heart failure. She denies PND, orthopnea, edema, palpitations, presyncope, and syncope, AICD shocks. She admitted to a decline in her LVEF when she was non compliant with her medications. More recently, she has been compliant. She is not sexually active.     History:  Past Medical History:   Diagnosis Date    AICD (automatic cardioverter/defibrillator) present     Anemia NEC     Cardiomyopathy ([address]     Contact dermatitis and other eczema, due to unspecified cause     hives    Hypertension      Past Surgical History:   Procedure Laterality Date    HX GYN      HX GYN  13        HX PACEMAKER      HX PACEMAKER  3/19/15    biotronic icd #[id number]     Social History     Social History    Marital status:      Spouse name: N/A    Number of children: 0    Years of education: N/A     Occupational History    [employer] analyist      Unknown     Social History Main Topics    Smoking status: Never Smoker    Smokeless tobacco: Never Used    Alcohol use No    Drug use: No      Comment: denies     Sexual activity: Yes     Partners: Male     Birth control/ protection: Pill     Other Topics Concern    Not on file     Social History Narrative    ** Merged History Encounter **          Family History   Problem Relation Age of Onset    Hypertension Mother     Hypertension Father     Diabetes Maternal Grandmother     Heart Disease Maternal Grandmother     Arthritis-osteo Maternal Grandfather     Asthma Brother     Arthritis-osteo Paternal [id number]     Arthritis-osteo Paternal Grandfather        Current Medications:   Current Outpatient Prescriptions   Medication Sig Dispense Refill    valsartan (DIOVAN) 40 mg tablet Take 1 Tab by mouth daily. Indications: Chronic Heart Failure 30 Tab 11    carvedilol (COREG) 25 mg tablet Take 1 Tab by mouth two (2) times daily (with meals). Indications: Chronic Heart Failure 60 Tab 6    COQ10, UBIQUINOL, PO Take 100 mg by mouth two (2) times a day.  CHOLECALCIFEROL, VITAMIN D3, (VITAMIN D3 PO) Take  by mouth daily.  chlorthalidone (HYGROTEN) 25 mg tablet Take 0.5 Tabs by mouth daily. Indications: PERIPHERAL EDEMA DUE TO CHRONIC HEART FAILURE 30 Tab 6    levocetirizine (XYZAL) 5 mg tablet Take 1 Tab by mouth daily. 30 Tab 5    fluticasone (FLONASE) 50 mcg/actuation nasal spray 2 Sprays by Both Nostrils route daily. 1 Bottle 5       Allergies:    Allergies   Allergen Reactions    Other Medication Hives     Allergic, to dust mites, molds    Pollen Extracts Hives       ROS:    General:  negative  HEENT: negative  Pulmonary: no cough, shortness of breath, or wheezing  Cardiac: positive for dyspnea on exertion  GI:  no abdominal pain, change in bowel habits, or black or bloody stools  Musculo: negative  Neuro: no TIA or stroke symptoms  Skin:  negative  Allergies: REMINDER:DOCUMENT ALLERGIES IN ALLERGY ACTIVITY  Psych: negative    Admission Weight: Last Weight   Weight: 177 lb 12.8 oz (80.6 kg) Weight: 177 lb 12.8 oz (80.6 kg)       Physical Exam:   Vitals:    Visit Vitals    BP 92/58 (BP 1 Location: Right arm, BP Patient Position: Sitting)    Pulse 92    Temp 98.3 °F (36.8 °C) (Oral)    Resp 20    Ht 5' 4\" (1.626 m)    Wt 177 lb 12.8 oz (80.6 kg)    SpO2 98%    BMI 30.52 kg/m2       General:  alert, cooperative  HEENT: PERRLA, EOMI and Sclera clear, anicteric  Neck:  supple, no significant adenopathy, carotids upstroke normal bilaterally, no bruits  CVP:  8 cm  ( - ) HJR  Cardiac: regular rate and rhythm, S1, S2 normal, no murmur, click, rub or gallop  Chest: breath sounds clear and equal bilaterally  Abdomen: soft, non-tender. Bowel sounds normal. No masses, no organomegaly. Extremity: extremities normal, atraumatic, no cyanosis or edema  Neuro: Alert and oriented to person, place, and time; normal strength and tone. Normal symmetric reflexes. Normal coordination and gait  Skin:   no rashes    Recent Labs:   Labs Latest Ref Rng & Units 1/10/2018   Calcium 8.7 - 10.2 mg/dL 9.1   Glucose 65 - 99 mg/dL 85   BUN 6 - 20 mg/dL 18   Creatinine 0.57 - 1.00 mg/dL 1.00   Sodium 134 - 144 mmol/L 139   Potassium 3.5 - 5.2 mmol/L 3.8   Some recent data might be hidden     EK/30/17  Sinus  Rhythm   -  Nonspecific T-abnormality. Echocardiogram:  1/15/18  Left ventricle: Size was at the upper limits of normal. Ejection fraction  was estimated to be 50 % in the range of 45 % to 55 %. No obvious wall  motion abnormalities identified in the views obtained except for mild  septal hypokinesis likely due to RV electronic pacing. Wall thickness was  below normal limits to normal--0.8-0.9 cm. Septum echodensity  prominent--similar to RV pacemaker lead density suggesting possible  sclerosis/fibrosis. Left ventricular diastolic function parameters were  normal for the patient's age. Right ventricle: A pacing wire was present. Right atrium: A pacing wire was present. Mitral valve: There was mild regurgitation. Tricuspid valve: There was mild regurgitation. Pulmonary artery systolic  pressure: 25 mmHg. There was no pulmonary hypertension. Pulmonic valve: There was trivial regurgitation. Cardiac MRI:   16    1. Normal left ventricular cavity size. Mild left ventricular systolic   dysfunction. Mild global hypokinesis without significant regional variation. LVEF 50%. 2. Normal right ventricular size and systolic function. RVEF 60%. 3. No significant valvular disease. 4. Normal pleura and pericardium.  There is no significant effusions. 5. On LGE study, there is no areas of myocardial enhancement to suggest   recent or old myocardial infarction. There is no features of infiltrative   sarcoidosis or amyloidosis. There is no features of inflammatory   myocarditis. There is no features of viral myocarditis. All myocardial walls   are completely viable. There is no myocardial scar. 6. Patient had in situ left precordial [employer] single-lead ICD. ICD   MRI protocol was followed. There was significant artifact from the ICD   overshadowing the anteroapical wall.       Impression / Plan:   1. HFrEF, improved - Stage C, NYHA Class II, d/t PPCM   On carvedilol, valsartan, chlorthalidone   Discontinue chlorthalidone   Increase valsartan to 80 mg daily   Counseled patient regarding birth control and risks of worsening HF and death with recurrent pregnancy   She is not currently sexually active   Counseled patient regarding the risk of birth defects with taking ARB    2.  Risk of SCD - s/p AICD   No shocks        Ashley Isidro MD    [facility]  [facility] [address] [place] [address]  [phone]  24 hour VAD/HF Pager: [phone]

## 2018-02-01 NOTE — COMMUNICATION BODY
[facility] Consultation Note      DOS:   2018  NAME:  Shabbir Sousa    MRN:   [id number]   REFERRING PROVIDER:  Kayleigh Hartley MD  PRIMARY CARE PHYSICIAN: None  PRIMARY CARDIOLOGIST: Kayleigh Hartley MD      Chief Complaint:   Chief Complaint   Patient presents with    Follow-up       HPI: 33y.o. year old female with a history of PPCM who presents for further evaluation of her chronic systolic heart failure. She denies PND, orthopnea, edema, palpitations, presyncope, and syncope, AICD shocks. She admitted to a decline in her LVEF when she was non compliant with her medications. More recently, she has been compliant. She is not sexually active.     History:  Past Medical History:   Diagnosis Date    AICD (automatic cardioverter/defibrillator) present     Anemia NEC     Cardiomyopathy ([address]     Contact dermatitis and other eczema, due to unspecified cause     hives    Hypertension      Past Surgical History:   Procedure Laterality Date    HX GYN      HX GYN  13        HX PACEMAKER      HX PACEMAKER  3/19/15    biotronic icd #[id number]     Social History     Social History    Marital status:      Spouse name: N/A    Number of children: 0    Years of education: N/A     Occupational History    jail analyist      Unknown     Social History Main Topics    Smoking status: Never Smoker    Smokeless tobacco: Never Used    Alcohol use No    Drug use: No      Comment: denies     Sexual activity: Yes     Partners: Male     Birth control/ protection: Pill     Other Topics Concern    Not on file     Social History Narrative    ** Merged History Encounter **          Family History   Problem Relation Age of Onset    Hypertension Mother     Hypertension Father     Diabetes Maternal Grandmother     Heart Disease Maternal Grandmother     Arthritis-osteo Maternal Grandfather     Asthma Brother     Arthritis-osteo Paternal [id number]     Arthritis-osteo Paternal Grandfather        Current Medications:   Current Outpatient Prescriptions   Medication Sig Dispense Refill    valsartan (DIOVAN) 40 mg tablet Take 1 Tab by mouth daily. Indications: Chronic Heart Failure 30 Tab 11    carvedilol (COREG) 25 mg tablet Take 1 Tab by mouth two (2) times daily (with meals). Indications: Chronic Heart Failure 60 Tab 6    COQ10, UBIQUINOL, PO Take 100 mg by mouth two (2) times a day.  CHOLECALCIFEROL, VITAMIN D3, (VITAMIN D3 PO) Take  by mouth daily.  chlorthalidone (HYGROTEN) 25 mg tablet Take 0.5 Tabs by mouth daily. Indications: PERIPHERAL EDEMA DUE TO CHRONIC HEART FAILURE 30 Tab 6    levocetirizine (XYZAL) 5 mg tablet Take 1 Tab by mouth daily. 30 Tab 5    fluticasone (FLONASE) 50 mcg/actuation nasal spray 2 Sprays by Both Nostrils route daily. 1 Bottle 5       Allergies:    Allergies   Allergen Reactions    Other Medication Hives     Allergic, to dust mites, molds    Pollen Extracts Hives       ROS:    General:  negative  HEENT: negative  Pulmonary: no cough, shortness of breath, or wheezing  Cardiac: positive for dyspnea on exertion  GI:  no abdominal pain, change in bowel habits, or black or bloody stools  Musculo: negative  Neuro: no TIA or stroke symptoms  Skin:  negative  Allergies: REMINDER:DOCUMENT ALLERGIES IN ALLERGY ACTIVITY  Psych: negative    Admission Weight: Last Weight   Weight: 177 lb 12.8 oz (80.6 kg) Weight: 177 lb 12.8 oz (80.6 kg)       Physical Exam:   Vitals:    Visit Vitals    BP 92/58 (BP 1 Location: Right arm, BP Patient Position: Sitting)    Pulse 92    Temp 98.3 °F (36.8 °C) (Oral)    Resp 20    Ht 5' 4\" (1.626 m)    Wt 177 lb 12.8 oz (80.6 kg)    SpO2 98%    BMI 30.52 kg/m2       General:  alert, cooperative  HEENT: PERRLA, EOMI and Sclera clear, anicteric  Neck:  supple, no significant adenopathy, carotids upstroke normal bilaterally, no bruits  CVP:  8 cm  ( - ) HJR  Cardiac: regular rate and rhythm, S1, S2 normal, no murmur, click, rub or gallop  Chest: breath sounds clear and equal bilaterally  Abdomen: soft, non-tender. Bowel sounds normal. No masses, no organomegaly. Extremity: extremities normal, atraumatic, no cyanosis or edema  Neuro: Alert and oriented to person, place, and time; normal strength and tone. Normal symmetric reflexes. Normal coordination and gait  Skin:   no rashes    Recent Labs:   Labs Latest Ref Rng & Units 1/10/2018   Calcium 8.7 - 10.2 mg/dL 9.1   Glucose 65 - 99 mg/dL 85   BUN 6 - 20 mg/dL 18   Creatinine 0.57 - 1.00 mg/dL 1.00   Sodium 134 - 144 mmol/L 139   Potassium 3.5 - 5.2 mmol/L 3.8   Some recent data might be hidden     EK/30/17  Sinus  Rhythm   -  Nonspecific T-abnormality. Echocardiogram:  1/15/18  Left ventricle: Size was at the upper limits of normal. Ejection fraction  was estimated to be 50 % in the range of 45 % to 55 %. No obvious wall  motion abnormalities identified in the views obtained except for mild  septal hypokinesis likely due to RV electronic pacing. Wall thickness was  below normal limits to normal--0.8-0.9 cm. Septum echodensity  prominent--similar to RV pacemaker lead density suggesting possible  sclerosis/fibrosis. Left ventricular diastolic function parameters were  normal for the patient's age. Right ventricle: A pacing wire was present. Right atrium: A pacing wire was present. Mitral valve: There was mild regurgitation. Tricuspid valve: There was mild regurgitation. Pulmonary artery systolic  pressure: 25 mmHg. There was no pulmonary hypertension. Pulmonic valve: There was trivial regurgitation. Cardiac MRI:   16    1. Normal left ventricular cavity size. Mild left ventricular systolic   dysfunction. Mild global hypokinesis without significant regional variation. LVEF 50%. 2. Normal right ventricular size and systolic function. RVEF 60%. 3. No significant valvular disease. 4. Normal pleura and pericardium.  There is no significant effusions. 5. On LGE study, there is no areas of myocardial enhancement to suggest   recent or old myocardial infarction. There is no features of infiltrative   sarcoidosis or amyloidosis. There is no features of inflammatory   myocarditis. There is no features of viral myocarditis. All myocardial walls   are completely viable. There is no myocardial scar. 6. Patient had in situ left precordial [employer] single-lead ICD. ICD   MRI protocol was followed. There was significant artifact from the ICD   overshadowing the anteroapical wall.       Impression / Plan:   1. HFrEF, improved - Stage C, NYHA Class II, d/t PPCM   On carvedilol, valsartan, chlorthalidone   Discontinue chlorthalidone   Increase valsartan to 80 mg daily   Counseled patient regarding birth control and risks of worsening HF and death with recurrent pregnancy   She is not currently sexually active   Counseled patient regarding the risk of birth defects with taking ARB    2.  Risk of SCD - s/p AICD   No shocks        Durga Bains MD    [facility]  [place] [address]  [phone]  24 hour VAD/HF Pager: [phone]

## 2018-02-13 ENCOUNTER — OFFICE VISIT (OUTPATIENT)
Dept: CARDIOLOGY CLINIC | Age: 33
End: 2018-02-13

## 2018-02-13 VITALS
OXYGEN SATURATION: 99 % | HEART RATE: 76 BPM | WEIGHT: 182 LBS | BODY MASS INDEX: 31.07 KG/M2 | HEIGHT: 64 IN | DIASTOLIC BLOOD PRESSURE: 83 MMHG | SYSTOLIC BLOOD PRESSURE: 123 MMHG

## 2018-02-13 DIAGNOSIS — Z95.810 ICD (IMPLANTABLE CARDIOVERTER-DEFIBRILLATOR) IN PLACE: ICD-10-CM

## 2018-02-13 DIAGNOSIS — R06.83 SNORING: ICD-10-CM

## 2018-02-13 DIAGNOSIS — I10 ESSENTIAL HYPERTENSION: Primary | ICD-10-CM

## 2018-02-13 NOTE — PROGRESS NOTES
Chief Complaint   Patient presents with   [address]     no complaits. 1. Have you been to the ER, urgent care clinic since your last visit? Hospitalized since your last visit? No    2. Have you seen or consulted any other health care providers outside of the [address] [place] [address] since your last visit? Include any pap smears or colon screening.  No

## 2018-02-13 NOTE — PROGRESS NOTES
[facility]   [address] [facility], [address]                                          NEW PATIENT HPI/FOLLOW-UP      NAME:  Anaya Wagner   :   1985   MRN:   [id number]   PCP:  None           Subjective: The patient is a 33y.o. year old female  who returns for a routine follow-up. Since the last visit, patient reports no new symptoms. Seen in [facility]. Valsartan dose increased to 80 mg every day and Chlorthalidone stopped. Cautioned about recurrent pregnancies in light of hx post-partum pregnancies to which I recautioned. Denies change in exercise tolerance, chest pain, edema, medication intolerance, palpitations, shortness of breath, PND/orthopnea wheezing, sputum, syncope, dizziness or light headedness. Doing satisfactorily. Review of Systems  General: Pt denies excessive weight gain or loss. Pt is able to conduct ADL's. Respiratory: Denies shortness of breath, MOULTON, wheezing or stridor.   Cardiovascular: Denies precordial pain, palpitations, edema or PND  Gastrointestinal: Denies poor appetite, indigestion, abdominal pain or blood in stool  Peripheral vascular: Denies claudication, leg cramps  Neuropsychiatric: Denies paresthesias,tingling,numbness,anxiety,depression,fatigue  Musculoskeletal: Denies pain,tenderness, soreness,swelling      Past Medical History:   Diagnosis Date    AICD (automatic cardioverter/defibrillator) present     Anemia NEC     Cardiomyopathy ([address]     Contact dermatitis and other eczema, due to unspecified cause     hives    Hypertension      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    Vitamin D deficiency 2011    Snoring 2011      Past Surgical History:   Procedure Laterality Date    HX GYN      HX GYN  13        HX PACEMAKER      HX PACEMAKER  3/19/15    biotronic icd #[id number]     Allergies   Allergen Reactions    Other Medication Hives     Allergic, to dust mites, molds    Pollen Extracts Hives      Family History   Problem Relation Age of Onset    Hypertension Mother     Hypertension Father     Diabetes Maternal Grandmother     Heart Disease Maternal Grandmother     Arthritis-osteo Maternal Grandfather     Asthma Brother     Arthritis-osteo Paternal Grandmother     Arthritis-osteo Paternal Grandfather       Social History     Social History    Marital status:      Spouse name: N/A    Number of children: 0    Years of education: N/A     Occupational History    [facility]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  carvedilol (COREG) 25 mg tablet Take 1 Tab by mouth two (2) times daily (with meals). Indications: Chronic Heart Failure    COQ10, UBIQUINOL, PO Take 100 mg by mouth two (2) times a day.  CHOLECALCIFEROL, VITAMIN D3, (VITAMIN D3 PO) Take  by mouth daily.  levocetirizine (XYZAL) 5 mg tablet Take 1 Tab by mouth daily.  fluticasone (FLONASE) 50 mcg/actuation nasal spray 2 Sprays by Both Nostrils route daily. No current facility-administered medications for this visit. I have reviewed the nurses notes, vitals, problem list, allergy list, medical history, family medical, social history and medications.         Objective:     Physical Exam:     Vitals:    02/13/18 0924 02/13/18 0928   BP: 117/84 123/83   Pulse: 81 76   SpO2: 99%    Weight: 182 lb (82.6 kg) Height: 5' 4\" (1.626 m)     Body mass index is 31.24 kg/(m^2). General: Well developed, in no acute distress. HEENT: No carotid bruits, no JVD, trach is midline. Heart:  Normal S1/S2 negative S3 or S4. Regular, no murmur, gallop or rub.   Respiratory: Clear bilaterally, no wheezing or rales  Abdomen:   Soft, non-tender, bowel sounds are active.   Extremities:  No edema, normal cap refill, no cyanosis. Neuro: A&Ox3, speech clear, gait stable. Skin: Skin color is normal. No rashes or lesions. No diaphoresis.   Vascular: 2+ pulses symmetric in all extremities        Data Review:       Cardiographics:    EKG: NSR,non-specific ST-T wave changes    Cardiology Labs:    Results for orders placed or performed during the hospital encounter of 09/16/15   EKG, 12 LEAD, INITIAL   Result Value Ref Range    Ventricular Rate 73 BPM    Atrial Rate 73 BPM    P-R Interval 138 ms    QRS Duration 82 ms    Q-T Interval 448 ms    QTC Calculation (Bezet) 493 ms    Calculated P Axis 51 degrees    Calculated R Axis 44 degrees    Calculated T Axis 2 degrees    Diagnosis       Normal sinus rhythm  Normal ekg  Confirmed by Renea Hoffman ([id number]) on 9/17/2015 7:29:30 AM         Lab Results   Component Value Date/Time    Cholesterol, total 139 06/02/2016 08:19 AM    HDL Cholesterol 45 06/02/2016 08:19 AM    LDL, calculated 76 06/02/2016 08:19 AM    Triglyceride 89 06/02/2016 08:19 AM       Lab Results   Component Value Date/Time    Sodium 139 01/10/2018 10:26 AM    Potassium 3.8 01/10/2018 10:26 AM    Chloride 99 01/10/2018 10:26 AM    CO2 24 01/10/2018 10:26 AM    Anion gap 9 09/16/2015 08:29 PM    Glucose 85 01/10/2018 10:26 AM    BUN 18 01/10/2018 10:26 AM    Creatinine 1.00 01/10/2018 10:26 AM    BUN/Creatinine ratio 18 01/10/2018 10:26 AM    GFR est AA 87 01/10/2018 10:26 AM    GFR est non-AA 75 01/10/2018 10:26 AM    Calcium 9.1 01/10/2018 10:26 AM    Bilirubin, total 0.3 09/16/2015 08:29 PM    AST (SGOT) 15 09/16/2015 08:29 PM Alk. phosphatase 57 09/16/2015 08:29 PM    Protein, total 7.9 09/16/2015 08:29 PM    Albumin 3.5 09/16/2015 08:29 PM    Globulin 4.4 (H) 09/16/2015 08:29 PM    A-G Ratio 0.8 (L) 09/16/2015 08:29 PM    ALT (SGPT) 18 09/16/2015 08:29 PM          Assessment:       ICD-10-CM ICD-9-CM    1. Essential hypertension I10 401.9 AMB POC EKG ROUTINE W/ 12 LEADS, INTER & REP   2. Cardiomyopathy, peripartum, postpartum O90.3 674.54 AMB POC EKG ROUTINE W/ 12 LEADS, INTER & REP   3. ICD (implantable cardioverter-defibrillator) in place Z95.810 V45.02 AMB POC EKG ROUTINE W/ 12 LEADS, INTER & REP   4. Snoring R06.83 786.09          Discussion: Patient presents at this time stable from a cardiac perspective. Tolerating increase in Valsartan by [facility]. Off Chlorthalidone. Advised to call if SBP < 90 mmHg and/or symptomatic. Pleased with present cardiac status. Plan: 1. Continue same meds. Lipid profile and labs followed by PCP. 2.Encouraged to exercise to tolerance, lose weight and follow low fat, low cholesterol, low sodium predominantly Plant-based (consider Mediterranean) diet. Call with questions or concerns. Will follow up any test results by phone and/or f/u here in office if needed. Inder Alva 3.Follow up: end of April. F/U [facility] end of March. Echo end of April. Consider MUGA scan or repeat cardiac MRI. I have discussed the diagnosis with the patient and the intended plan as seen in the above orders. The patient has received an after-visit summary and questions were answered concerning future plans. I have discussed any concerning medication side effects and warnings with the patient as well.     Zach Harper MD  2/13/2018

## 2018-02-13 NOTE — MR AVS SNAPSHOT
Catherine Miller 
 
 
 [address] 
[phone] Patient: Claribel Lay MRN: [id number] GIR:[id number] Visit Information Date & Time Provider Department Dept. Phone Encounter #  
 2/13/2018  9:00 AM MD Terrence Moraes [facility] [phone] [id number] Your Appointments 3/29/2018  1:00 PM  
Follow Up with Choco Neville, [facility] [address] [facility] ([facility]) [facility] [address]  
[phone]  
  
   
 [address] [address]  
  
    
 4/24/2018  9:15 AM  
ESTABLISHED PATIENT with MD Terrence Moraes [facility]) Appt Note: 2 MO. F/U  
 [address]  
[phone] [address]  
  
    
  
 2/16/2018  8:00 AM  
REMOTE OFFICE VISIT with [facility]) Appt Note: NOT AN OFFICE VISIT - REMOTE BIO ICD  
 [address] [facility]  
[phone] [address] [facility] Upcoming Health Maintenance Date Due  
 PAP AKA CERVICAL CYTOLOGY 9/24/2007 Influenza Age 5 to Adult 8/1/2017 DTaP/Tdap/Td series (3 - Td) 5/25/2026 Allergies as of 2/13/2018  Review Complete On: 2/13/2018 By: Marty Potter LPN Severity Noted Reaction Type Reactions Other Medication  01/24/2013   Side Effect Hives Allergic, to dust mites, molds Pollen Extracts  05/12/2015    Hives Current Immunizations  Reviewed on 5/31/2016 Name Date Influenza Vaccine 10/15/2015,  Deferred (Patient Refused) Influenza Vaccine Intradermal PF 1/16/2017 Pneumococcal Polysaccharide (PPSV-23) 10/15/2015 TDAP Vaccine 8/20/2011  Tdap 5/25/2016  9:21 PM  
 Not reviewed this visit You Were Diagnosed With   
  
 Codes Comments ICD (implantable cardioverter-defibrillator) in place    -  Primary ICD-10-CM: Z95.810 ICD-9-CM: V45.02 Vitals BP Pulse Height(growth percentile) Weight(growth percentile) SpO2 BMI  
 123/83 (BP 1 Location: Right arm, BP Patient Position: Sitting) 76 5' 4\" (1.626 m) 182 lb (82.6 kg) 99% 31.24 kg/m2 OB Status Smoking Status Having regular periods Never Smoker Vitals History BMI and BSA Data Body Mass Index Body Surface Area  
 31.24 kg/m 2 1.93 m 2 [facility] [address] [facility] [phone] Your Updated Medication List  
  
   
This list is accurate as of: 2/13/18  9:54 AM.  Always use your most recent med list.  
  
  
  
  
 carvedilol 25 mg tablet Commonly known as:  Andry Perez Take 1 Tab by mouth two (2) times daily (with meals). Indications: Chronic Heart Failure COQ10 (UBIQUINOL) PO Take 100 mg by mouth two (2) times a day. fluticasone 50 mcg/actuation nasal spray Commonly known as:  Rima Golds 2 Sprays by Both Nostrils route daily. levocetirizine 5 mg tablet Commonly known as:  Re Pratik Take 1 Tab by mouth daily. valsartan 80 mg tablet Commonly known as:  DIOVAN Take 1 Tab by mouth daily. VITAMIN D3 PO Take  by mouth daily. We Performed the Following AMB POC EKG ROUTINE W/ 12 LEADS, INTER & REP [71484 CPT(R)] Introducing [facility]! Prateek Garza introduces [employer] patient portal. Now you can access parts of your medical record, email your doctor's office, and request medication refills online. 1. In your internet browser, go to [email or web address] ([email or web address] 2. Click on the First Time User? Click Here link in the Sign In box. You will see the New Member Sign Up page. 3. Enter your SafetyCertified Access Code exactly as it appears below. You will not need to use this code after youve completed the sign-up process. If you do not sign up before the expiration date, you must request a new code. · SafetyCertified Access Code: [email or web address] Expires: 4/10/2018 10:05 AM 
 
4. Enter the last four digits of your Social Security Number (xxxx) and Date of Birth (mm/dd/yyyy) as indicated and click Submit. You will be taken to the next sign-up page. 5. Create a SafetyCertified ID. This will be your SafetyCertified login ID and cannot be changed, so think of one that is secure and easy to remember. 6. Create a SafetyCertified password. You can change your password at any time. 7. Enter your Password Reset Question and Answer. This can be used at a later time if you forget your password. 8. Enter your e-mail address. You will receive e-mail notification when new information is available in [address] 9. Click Sign Up. You can now view and download portions of your medical record. 10. Click the Download Summary menu link to download a portable copy of your medical information. If you have questions, please visit the Frequently Asked Questions section of the SafetyCertified website. Remember, SafetyCertified is NOT to be used for urgent needs. For medical emergencies, dial 911. Now available from your iPhone and Android! Please provide this summary of care documentation to your next provider. Your primary care clinician is listed as NONE. If you have any questions after today's visit, please call [phone].

## 2018-02-14 ENCOUNTER — TELEPHONE (OUTPATIENT)
Dept: CARDIOLOGY CLINIC | Age: 33
End: 2018-02-14

## 2018-02-14 DIAGNOSIS — Z95.810 ICD (IMPLANTABLE CARDIOVERTER-DEFIBRILLATOR) IN PLACE: ICD-10-CM

## 2018-02-14 DIAGNOSIS — I50.22 SYSTOLIC CHF, CHRONIC (HCC): ICD-10-CM

## 2018-02-14 DIAGNOSIS — R06.83 SNORING: ICD-10-CM

## 2018-02-14 DIAGNOSIS — I10 ESSENTIAL HYPERTENSION: ICD-10-CM

## 2018-02-14 RX ORDER — VALSARTAN 80 MG/1
80 TABLET ORAL DAILY
Qty: 30 TAB | Refills: 11 | OUTPATIENT
Start: 2018-02-14

## 2018-02-16 ENCOUNTER — OFFICE VISIT (OUTPATIENT)
Dept: CARDIOLOGY CLINIC | Age: 33
End: 2018-02-16

## 2018-02-16 DIAGNOSIS — Z95.810 ICD (IMPLANTABLE CARDIOVERTER-DEFIBRILLATOR) IN PLACE: Primary | ICD-10-CM

## 2018-03-28 ENCOUNTER — TELEPHONE (OUTPATIENT)
Dept: CARDIOLOGY CLINIC | Age: 33
End: 2018-03-28

## 2018-03-28 NOTE — TELEPHONE ENCOUNTER
Telephone Call RE:  Appointment reminder     Outcome:     [x] Patient confirmed appointment   [] Patient rescheduled appointment for    [] Unable to reach   [] Left message              [] Other:       Trini Kaba

## 2018-03-29 ENCOUNTER — OFFICE VISIT (OUTPATIENT)
Dept: CARDIOLOGY CLINIC | Age: 33
End: 2018-03-29

## 2018-03-29 VITALS
WEIGHT: 181.4 LBS | TEMPERATURE: 98.4 F | BODY MASS INDEX: 30.97 KG/M2 | HEIGHT: 64 IN | RESPIRATION RATE: 20 BRPM | SYSTOLIC BLOOD PRESSURE: 114 MMHG | DIASTOLIC BLOOD PRESSURE: 76 MMHG | HEART RATE: 74 BPM | OXYGEN SATURATION: 99 %

## 2018-03-29 DIAGNOSIS — I10 ESSENTIAL HYPERTENSION: ICD-10-CM

## 2018-03-29 DIAGNOSIS — R06.02 SOB (SHORTNESS OF BREATH): ICD-10-CM

## 2018-03-29 DIAGNOSIS — E55.9 VITAMIN D DEFICIENCY: ICD-10-CM

## 2018-03-29 DIAGNOSIS — Z95.810 ICD (IMPLANTABLE CARDIOVERTER-DEFIBRILLATOR) IN PLACE: ICD-10-CM

## 2018-03-29 DIAGNOSIS — I50.22 SYSTOLIC CHF, CHRONIC (HCC): ICD-10-CM

## 2018-03-29 DIAGNOSIS — I50.20 SYSTOLIC CONGESTIVE HEART FAILURE WITH REDUCED LEFT VENTRICULAR FUNCTION, NYHA CLASS 2 (HCC): Primary | ICD-10-CM

## 2018-03-29 DIAGNOSIS — R06.83 SNORING: ICD-10-CM

## 2018-03-29 RX ORDER — VALSARTAN 80 MG/1
80 TABLET ORAL 2 TIMES DAILY
Qty: 60 TAB | Refills: 3 | Status: SHIPPED | OUTPATIENT
Start: 2018-03-29 | End: 2018-07-17 | Stop reason: SDUPTHER

## 2018-03-29 NOTE — LETTER
3/29/2018 2:14 PM 
 
Patient:  Shabbir Sousa YOB: 1985 Date of Visit: 3/29/2018 Dear Adina Mcknight MD 
[address] [address] VIA In Basket Bandar Melendez MD 
[address] [address] VIA In Basket 
 : Thank you for referring Ms. Elvira Barraza to me for evaluation/treatment. Below are the relevant portions of my assessment and plan of care. Helenrt Note DOS:   3/29/2018 NAME:  Shabbir Sousa MRN:   [id number] REFERRING PROVIDER:  Kayleigh Hartley MD 
PRIMARY CARE PHYSICIAN: None PRIMARY CARDIOLOGIST: Kayleigh Hartley MD 
EP: Dr. Aydee Quinonezort / Plan: 1. HFrEF, improved - Stage C, NYHA Class II, d/t PPCM Increase valsartan to 80 mg BID Continue  carvedilol Counseled patient regarding birth control and risks of worsening HF and death with recurrent pregnancy She is not currently sexually active Counseled patient regarding the risk of birth defects with taking ARB Repeat echo in June 2. Risk of SCD - s/p AICD No shocks Last check 2/2018 brief SVT - asx Chief Complaint:  
Chief Complaint Patient presents with  Follow-up  Headache HPI: 33y.o. year old female with a history of HFrEF (NICM in the setting of PPCM who presents for further evaluation of her chronic systolic heart failure. Her biggest complaint is fatigue, more so in the afternoon. She sleeps well. Notes MOULTON with moderate exertion. She had a (-) sleep study several years ago. She has been compliant with her meds, eats out almost daily, does not add salt. Wt  178-180 she does not follow  her BP Does not exercise regularly She denies PND, orthopnea, edema, palpitations, presyncope, and syncope, AICD shocks. She admitted to a decline in her LVEF when she was non compliant with her medications. More recently, she has been compliant.  She is not sexually active. CARDIAC EVALUATION  
ECHO 2013: EF 50%, mild MR/TR  
ECHO 2015: EF 30-35% ECHO 2015:LVD, EF 25-30%, [address] 
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 Rare etoh, never smoked History: 
Past Medical History:  
Diagnosis Date  AICD (automatic cardioverter/defibrillator) present  Anemia NEC  Cardiomyopathy, peripartum, postpartum 2/10/2015 ECHO 2013: EF 50%, mild MR/TR  ECHO 2015: EF 30-35% ECHO 2015:LVD, EF 25-30%, [address] ECHO 2016: EF 50% ECHO 2017: EF 40-45%, mild DHK, LVH, RV mild dilated ECHO 2018: EF 50%, no WMA, wall thickness < no (.8-.9 cm), mil MR/TR,   CARDIAC MRI 2016: LVEF 50%, mild GHK, RVEF 60%   AICD : [employer]  Contact dermatitis and other eczema, due to unspecified cause   
 hives  Hypertension  Systolic congestive heart failure with reduced left ventricular function, NYHA class 2 ([address] 3/29/2018 Past Surgical History:  
Procedure Laterality Date  HX GYN  13   HX IMPLANTABLE CARDIOVERTER DEFIBRILLATOR  2015 Audrey Wilcox #[id number] Social History Social History  Marital status:  Spouse name: N/A  
 Number of children: 0  
 Years of education: N/A Occupational History  long term analyist   
  Unknown Social History Main Topics  Smoking status: Never Smoker  Smokeless tobacco: Never Used  Alcohol use No  
 Drug use: No  
   Comment: denies  Sexual activity: Yes  
  Partners: Male Birth control/ protection: Pill Other Topics Concern  Not on file Social History Narrative ** Merged History Encounter ** Family History Problem Relation Age of Onset  Hypertension Mother  Hypertension Father  Diabetes Maternal Grandmother  Heart Disease Maternal Grandmother  Arthritis-osteo Maternal Grandfather  Asthma Brother  Arthritis-osteo Paternal Grandmother  Arthritis-osteo Paternal Grandfather Current Medications:  
Current Outpatient Prescriptions Medication Sig Dispense Refill  valsartan (DIOVAN) 80 mg tablet Take 1 Tab by mouth daily. 30 Tab 11  
 carvedilol (COREG) 25 mg tablet Take 1 Tab by mouth two (2) times daily (with meals). Indications: Chronic Heart Failure 60 Tab 6  
 COQ10, UBIQUINOL, PO Take 100 mg by mouth two (2) times a day.  CHOLECALCIFEROL, VITAMIN D3, (VITAMIN D3 PO) Take  by mouth daily.  levocetirizine (XYZAL) 5 mg tablet Take 1 Tab by mouth daily. 30 Tab 5  
 fluticasone (FLONASE) 50 mcg/actuation nasal spray 2 Sprays by Both Nostrils route daily. 1 Bottle 5 Allergies: Allergies Allergen Reactions  Other Medication Hives Allergic, to dust mites, molds  Pollen Extracts Hives ROS:   
General:  negative HEENT: negative Pulmonary: no cough, shortness of breath, or wheezing Cardiac: Per HPI 
GI:  no abdominal pain, change in bowel habits, or black or bloody stools Musculo: negative Neuro: no TIA or stroke symptoms Skin:  negative Allergies: REMINDER:DOCUMENT ALLERGIES IN ALLERGY ACTIVITY Psych: negative Admission Weight: Last Weight Weight: 181 lb 6.4 oz (82.3 kg) Weight: 181 lb 6.4 oz (82.3 kg) Physical Exam:  
Vitals:   
Visit Vitals  /76 (BP 1 Location: Left arm, BP Patient Position: Sitting)  Pulse 74  Temp 98.4 °F (36.9 °C) (Oral)  Resp 20  
 Ht 5' 4\" (1.626 m)  Wt 181 lb 6.4 oz (82.3 kg)  SpO2 99%  BMI 31.14 kg/m2 General:  alert, cooperative HEENT: Normocephalic, atraumatic, PERRLA,  and Sclera clear, anicteric Neck:  supple, no significant adenopathy, carotids upstroke normal bilaterally, no bruits CVP:  8 cm  ( - ) HJR 
 Cardiac: PMI diminished, AICD left infraclavicular space, RRR,  S1, S2 normal, no murmur, click, rub or gallop Chest: breath sounds clear and equal bilaterally Abdomen: soft, non-tender. Bowel sounds normal. No masses, no organomegaly. Extremity: extremities normal, atraumatic, no cyanosis or edema Neuro: A&O x 3 no focal deficits Skin:   no rashes Recent Labs:  
Lab Results Component Value Date/Time WBC 9.5 09/16/2015 08:29 PM  
HGB 12.2 09/16/2015 08:29 PM  
HCT 36.4 09/16/2015 08:29 PM  
PLATELET 555 92/07/7040 08:29 PM  
MCV 92.9 09/16/2015 08:29 PM  
 
Lab Results Component Value Date/Time TSH 1.840 06/02/2016 08:19 AM  
  
Lab Results Component Value Date/Time NT pro-BNP 1400 (H) 09/17/2013 10:17 PM  
  
Lab Results Component Value Date/Time Sodium 139 01/10/2018 10:26 AM  
 Potassium 3.8 01/10/2018 10:26 AM  
 Chloride 99 01/10/2018 10:26 AM  
 CO2 24 01/10/2018 10:26 AM  
 Anion gap 9 09/16/2015 08:29 PM  
 Glucose 85 01/10/2018 10:26 AM  
 BUN 18 01/10/2018 10:26 AM  
 Creatinine 1.00 01/10/2018 10:26 AM  
 BUN/Creatinine ratio 18 01/10/2018 10:26 AM  
 GFR est AA 87 01/10/2018 10:26 AM  
 GFR est non-AA 75 01/10/2018 10:26 AM  
 Calcium 9.1 01/10/2018 10:26 AM  
  
Lab Results Component Value Date/Time Sodium 139 01/10/2018 10:26 AM  
 Potassium 3.8 01/10/2018 10:26 AM  
 Chloride 99 01/10/2018 10:26 AM  
 CO2 24 01/10/2018 10:26 AM  
 Anion gap 9 09/16/2015 08:29 PM  
 Glucose 85 01/10/2018 10:26 AM  
 BUN 18 01/10/2018 10:26 AM  
 Creatinine 1.00 01/10/2018 10:26 AM  
 BUN/Creatinine ratio 18 01/10/2018 10:26 AM  
 GFR est AA 87 01/10/2018 10:26 AM  
 GFR est non-AA 75 01/10/2018 10:26 AM  
 Calcium 9.1 01/10/2018 10:26 AM  
 Bilirubin, total 0.3 09/16/2015 08:29 PM  
 ALT (SGPT) 18 09/16/2015 08:29 PM  
 AST (SGOT) 15 09/16/2015 08:29 PM  
 Alk.  phosphatase 57 09/16/2015 08:29 PM  
 Protein, total 7.9 09/16/2015 08:29 PM  
 Albumin 3.5 09/16/2015 08:29 PM  
 Globulin 4.4 (H) 2015 08:29 PM  
 A-G Ratio 0.8 (L) 2015 08:29 PM  
  
Lab Results Component Value Date/Time Hemoglobin A1c 5.6 2016 08:19 AM  
  
 
EK/30/17: Sinus  Rhythm; -  Nonspecific T-abnormality. 18; NSR, NSST changes, QTc 439 ms, QRS 90 ms WILMER Garay [facility] [address] [facility] [address] [address] 
[phone] 
05 hour VAD/HF Pager: [phone] If you have questions, please do not hesitate to call me. I look forward to following MsSuraj Lorne Stuart along with you. Sincerely, WILMER Garay

## 2018-03-29 NOTE — COMMUNICATION BODY
[facility] Clinic Note      DOS:   3/29/2018  NAME:  Patience Pendleton    MRN:   [id number]   REFERRING PROVIDER:  Lupis Salomon MD  PRIMARY CARE PHYSICIAN: None  PRIMARY CARDIOLOGIST: Lupis Salomon MD  EP: Dr. Jose Tom / Plan:   1. HFrEF, improved - Stage C, NYHA Class II, d/t PPCM   Increase valsartan to 80 mg BID   Continue  carvedilol   Counseled patient regarding birth control and risks of worsening HF and death with recurrent pregnancy   She is not currently sexually active   Counseled patient regarding the risk of birth defects with taking ARB   Repeat echo in June 2. Risk of SCD - s/p AICD   No shocks   Last check 2/2018 brief SVT - asx        Chief Complaint:   Chief Complaint   Patient presents with    Follow-up    Headache       HPI: 33y.o. year old female with a history of HFrEF (NICM in the setting of PPCM who presents for further evaluation of her chronic systolic heart failure. Her biggest complaint is fatigue, more so in the afternoon. She sleeps well. Notes MOULTON with moderate exertion. She had a (-) sleep study several years ago. She has been compliant with her meds, eats out almost daily, does not add salt. Wt  178-180 she does not follow  her BP   Does not exercise regularly     She denies PND, orthopnea, edema, palpitations, presyncope, and syncope, AICD shocks. She admitted to a decline in her LVEF when she was non compliant with her medications. More recently, she has been compliant. She is not sexually active. 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  Rare etoh, never smoked     History:  Past Medical History:   Diagnosis Date    AICD (automatic cardioverter/defibrillator) present     Anemia NEC     Cardiomyopathy, peripartum, postpartum 2/10/2015    ECHO : EF 50%, mild MR/TR  ECHO 2015: EF 30-35% ECHO 2015:LVD, EF 25-30%, [address] ECHO 2016: EF 50% ECHO 2017: EF 40-45%, mild DHK, LVH, RV mild dilated ECHO 2018: EF 50%, no WMA, wall thickness < no (.8-.9 cm), mil MR/TR,   CARDIAC MRI 2016: LVEF 50%, mild GHK, RVEF 60%   AICD : [employer]     Contact dermatitis and other eczema, due to unspecified cause     hives    Hypertension     Systolic congestive heart failure with reduced left ventricular function, NYHA class 2 ([facility] [address] 3/29/2018     Past Surgical History:   Procedure Laterality Date    HX GYN  13        HX IMPLANTABLE CARDIOVERTER DEFIBRILLATOR  2015    [employer] #[id number]     Social History     Social History    Marital status:      Spouse name: N/A    Number of children: 0    Years of education: N/A     Occupational History    senior living analyist      Unknown     Social History Main Topics    Smoking status: Never Smoker    Smokeless tobacco: Never Used    Alcohol use No    Drug use: No      Comment: denies     Sexual activity: Yes     Partners: Male     Birth control/ protection: Pill     Other Topics Concern    Not on file     Social History Narrative    ** Merged History Encounter **          Family History   Problem Relation Age of Onset    Hypertension Mother     Hypertension Father     Diabetes Maternal Grandmother     Heart Disease Maternal Grandmother     Arthritis-osteo Maternal Grandfather     Asthma Brother     Arthritis-osteo Paternal Grandmother     Arthritis-osteo Paternal Grandfather        Current Medications:   Current Outpatient Prescriptions   Medication Sig Dispense Refill    valsartan (DIOVAN) 80 mg tablet Take 1 Tab by mouth daily.  30 Tab 11    carvedilol (COREG) 25 mg tablet Take 1 Tab by mouth two (2) times daily (with meals). Indications: Chronic Heart Failure 60 Tab 6    COQ10, UBIQUINOL, PO Take 100 mg by mouth two (2) times a day.  CHOLECALCIFEROL, VITAMIN D3, (VITAMIN D3 PO) Take  by mouth daily.  levocetirizine (XYZAL) 5 mg tablet Take 1 Tab by mouth daily. 30 Tab 5    fluticasone (FLONASE) 50 mcg/actuation nasal spray 2 Sprays by Both Nostrils route daily. 1 Bottle 5       Allergies: Allergies   Allergen Reactions    Other Medication Hives     Allergic, to dust mites, molds    Pollen Extracts Hives       ROS:    General:  negative  HEENT: negative  Pulmonary: no cough, shortness of breath, or wheezing  Cardiac: Per HPI  GI:  no abdominal pain, change in bowel habits, or black or bloody stools  Musculo: negative  Neuro: no TIA or stroke symptoms  Skin:  negative  Allergies: REMINDER:DOCUMENT ALLERGIES IN ALLERGY ACTIVITY  Psych: negative    Admission Weight: Last Weight   Weight: 181 lb 6.4 oz (82.3 kg) Weight: 181 lb 6.4 oz (82.3 kg)       Physical Exam:   Vitals:    Visit Vitals    /76 (BP 1 Location: Left arm, BP Patient Position: Sitting)    Pulse 74    Temp 98.4 °F (36.9 °C) (Oral)    Resp 20    Ht 5' 4\" (1.626 m)    Wt 181 lb 6.4 oz (82.3 kg)    SpO2 99%    BMI 31.14 kg/m2       General:  alert, cooperative  HEENT: Normocephalic, atraumatic, PERRLA,  and Sclera clear, anicteric  Neck:  supple, no significant adenopathy, carotids upstroke normal bilaterally, no bruits  CVP:  8 cm  ( - ) HJR  Cardiac: PMI diminished, AICD left infraclavicular space, RRR,  S1, S2 normal, no murmur, click, rub or gallop  Chest: breath sounds clear and equal bilaterally  Abdomen: soft, non-tender. Bowel sounds normal. No masses, no organomegaly.   Extremity: extremities normal, atraumatic, no cyanosis or edema  Neuro: A&O x 3 no focal deficits   Skin:   no rashes    Recent Labs:   Lab Results  Component Value Date/Time   WBC 9.5 09/16/2015 08:29 PM   HGB 12.2 09/16/2015 08:29 PM   HCT 36.4 2015 08:29 PM   PLATELET 815  08:29 PM   MCV 92.9 2015 08:29 PM     Lab Results  Component Value Date/Time   TSH 1.840 2016 08:19 AM      Lab Results   Component Value Date/Time    NT pro-BNP 1400 (H) 2013 10:17 PM      Lab Results   Component Value Date/Time    Sodium 139 01/10/2018 10:26 AM    Potassium 3.8 01/10/2018 10:26 AM    Chloride 99 01/10/2018 10:26 AM    CO2 24 01/10/2018 10:26 AM    Anion gap 9 2015 08:29 PM    Glucose 85 01/10/2018 10:26 AM    BUN 18 01/10/2018 10:26 AM    Creatinine 1.00 01/10/2018 10:26 AM    BUN/Creatinine ratio 18 01/10/2018 10:26 AM    GFR est AA 87 01/10/2018 10:26 AM    GFR est non-AA 75 01/10/2018 10:26 AM    Calcium 9.1 01/10/2018 10:26 AM      Lab Results   Component Value Date/Time    Sodium 139 01/10/2018 10:26 AM    Potassium 3.8 01/10/2018 10:26 AM    Chloride 99 01/10/2018 10:26 AM    CO2 24 01/10/2018 10:26 AM    Anion gap 9 2015 08:29 PM    Glucose 85 01/10/2018 10:26 AM    BUN 18 01/10/2018 10:26 AM    Creatinine 1.00 01/10/2018 10:26 AM    BUN/Creatinine ratio 18 01/10/2018 10:26 AM    GFR est AA 87 01/10/2018 10:26 AM    GFR est non-AA 75 01/10/2018 10:26 AM    Calcium 9.1 01/10/2018 10:26 AM    Bilirubin, total 0.3 2015 08:29 PM    ALT (SGPT) 18 2015 08:29 PM    AST (SGOT) 15 2015 08:29 PM    Alk. phosphatase 57 2015 08:29 PM    Protein, total 7.9 2015 08:29 PM    Albumin 3.5 2015 08:29 PM    Globulin 4.4 (H) 2015 08:29 PM    A-G Ratio 0.8 (L) 2015 08:29 PM      Lab Results   Component Value Date/Time    Hemoglobin A1c 5.6 2016 08:19 AM        EK/30/17: Sinus  Rhythm; -  Nonspecific T-abnormality.    18; NSR, NSST changes, QTc 439 ms, QRS 90 ms      Margaret Valadez Phong [address] [address]  [phone]  24 hour VAD/HF Pager: [phone]

## 2018-03-29 NOTE — PATIENT INSTRUCTIONS
1. Increase valsartan to 80 mg twice a day- follow you blood pressure  2. Continue other meds  3. Increase exercise with goal of 30 minutes most days of the week   4. Labs today   Continue daily weights (in the morning, after passing your urine). Notify HF team of overnight weight gains > 2 lbs or weekly >5 lbs or if any of the following Sx. Continue to limit sodium intake & monitor your fluid intake (not to exceed 2L per day). Call us for any problems,  questions, or concerns.      Follow up 1 month- sooner if needed

## 2018-03-29 NOTE — PROGRESS NOTES
[facility] Clinic Note      DOS:   3/29/2018  NAME:  Jefferson Lee    MRN:   [id number]   REFERRING PROVIDER:  Delmer Tao MD  PRIMARY CARE PHYSICIAN: None  PRIMARY CARDIOLOGIST: Delmer Tao MD  EP: Dr. Pimentel Branch / Plan:   1. HFrEF, improved - Stage C, NYHA Class II, d/t PPCM   Increase valsartan to 80 mg BID   Continue  carvedilol   Counseled patient regarding birth control and risks of worsening HF and death with recurrent pregnancy   She is not currently sexually active   Counseled patient regarding the risk of birth defects with taking ARB   Repeat echo in June 2. Risk of SCD - s/p AICD   No shocks   Last check 2/2018 brief SVT - asx        Chief Complaint:   Chief Complaint   Patient presents with    Follow-up    Headache       HPI: 33y.o. year old female with a history of HFrEF (NICM in the setting of PPCM who presents for further evaluation of her chronic systolic heart failure. Her biggest complaint is fatigue, more so in the afternoon. She sleeps well. Notes MOULTON with moderate exertion. She had a (-) sleep study several years ago. She has been compliant with her meds, eats out almost daily, does not add salt. Wt  178-180 she does not follow  her BP   Does not exercise regularly     She denies PND, orthopnea, edema, palpitations, presyncope, and syncope, AICD shocks. She admitted to a decline in her LVEF when she was non compliant with her medications. More recently, she has been compliant. She is not sexually active. 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  Rare etoh, never smoked     History:  Past Medical History:   Diagnosis Date    AICD (automatic cardioverter/defibrillator) present     Anemia NEC     Cardiomyopathy, peripartum, postpartum 2/10/2015    ECHO : EF 50%, mild MR/TR  ECHO 2015: EF 30-35% ECHO 2015:LVD, EF 25-30%, [address] ECHO 2016: EF 50% ECHO 2017: EF 40-45%, mild DHK, LVH, RV mild dilated ECHO 2018: EF 50%, no WMA, wall thickness < no (.8-.9 cm), mil MR/TR,   CARDIAC MRI 2016: LVEF 50%, mild GHK, RVEF 60%   AICD : Biotronik     Contact dermatitis and other eczema, due to unspecified cause     hives    Hypertension     Systolic congestive heart failure with reduced left ventricular function, NYHA class 2 ([facility] [address] 3/29/2018     Past Surgical History:   Procedure Laterality Date    HX GYN  13        HX IMPLANTABLE CARDIOVERTER DEFIBRILLATOR  2015    [employer] #[id number]     Social History     Social History    Marital status:      Spouse name: N/A    Number of children: 0    Years of education: N/A     Occupational History    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      Unknown     Social History Main Topics    Smoking status: Never Smoker    Smokeless tobacco: Never Used    Alcohol use No    Drug use: No      Comment: denies     Sexual activity: Yes     Partners: Male     Birth control/ protection: Pill     Other Topics Concern    Not on file     Social History Narrative    ** Merged History Encounter **          Family History   Problem Relation Age of Onset    Hypertension Mother     Hypertension Father     Diabetes Maternal Grandmother     Heart Disease Maternal Grandmother     Arthritis-osteo Maternal Grandfather     Asthma Brother     Arthritis-osteo Paternal Grandmother     Arthritis-osteo Paternal Grandfather        Current Medications:   Current Outpatient Prescriptions   Medication Sig Dispense Refill    valsartan (DIOVAN) 80 mg tablet Take 1 Tab by mouth daily.  30 Tab 11    carvedilol (COREG) 25 mg tablet Take 1 Tab by mouth two (2) times daily (with meals). Indications: Chronic Heart Failure 60 Tab 6    COQ10, UBIQUINOL, PO Take 100 mg by mouth two (2) times a day.  CHOLECALCIFEROL, VITAMIN D3, (VITAMIN D3 PO) Take  by mouth daily.  levocetirizine (XYZAL) 5 mg tablet Take 1 Tab by mouth daily. 30 Tab 5    fluticasone (FLONASE) 50 mcg/actuation nasal spray 2 Sprays by Both Nostrils route daily. 1 Bottle 5       Allergies: Allergies   Allergen Reactions    Other Medication Hives     Allergic, to dust mites, molds    Pollen Extracts Hives       ROS:    General:  negative  HEENT: negative  Pulmonary: no cough, shortness of breath, or wheezing  Cardiac: Per HPI  GI:  no abdominal pain, change in bowel habits, or black or bloody stools  Musculo: negative  Neuro: no TIA or stroke symptoms  Skin:  negative  Allergies: REMINDER:DOCUMENT ALLERGIES IN ALLERGY ACTIVITY  Psych: negative    Admission Weight: Last Weight   Weight: 181 lb 6.4 oz (82.3 kg) Weight: 181 lb 6.4 oz (82.3 kg)       Physical Exam:   Vitals:    Visit Vitals    /76 (BP 1 Location: Left arm, BP Patient Position: Sitting)    Pulse 74    Temp 98.4 °F (36.9 °C) (Oral)    Resp 20    Ht 5' 4\" (1.626 m)    Wt 181 lb 6.4 oz (82.3 kg)    SpO2 99%    BMI 31.14 kg/m2       General:  alert, cooperative  HEENT: Normocephalic, atraumatic, PERRLA,  and Sclera clear, anicteric  Neck:  supple, no significant adenopathy, carotids upstroke normal bilaterally, no bruits  CVP:  8 cm  ( - ) HJR  Cardiac: PMI diminished, AICD left infraclavicular space, RRR,  S1, S2 normal, no murmur, click, rub or gallop  Chest: breath sounds clear and equal bilaterally  Abdomen: soft, non-tender. Bowel sounds normal. No masses, no organomegaly.   Extremity: extremities normal, atraumatic, no cyanosis or edema  Neuro: A&O x 3 no focal deficits   Skin:   no rashes    Recent Labs:   Lab Results  Component Value Date/Time   WBC 9.5 09/16/2015 08:29 PM   HGB 12.2 09/16/2015 08:29 PM   HCT 36.4 2015 08:29 PM   PLATELET 676  08:29 PM   MCV 92.9 2015 08:29 PM     Lab Results  Component Value Date/Time   TSH 1.840 2016 08:19 AM      Lab Results   Component Value Date/Time    NT pro-BNP 1400 (H) 2013 10:17 PM      Lab Results   Component Value Date/Time    Sodium 139 01/10/2018 10:26 AM    Potassium 3.8 01/10/2018 10:26 AM    Chloride 99 01/10/2018 10:26 AM    CO2 24 01/10/2018 10:26 AM    Anion gap 9 2015 08:29 PM    Glucose 85 01/10/2018 10:26 AM    BUN 18 01/10/2018 10:26 AM    Creatinine 1.00 01/10/2018 10:26 AM    BUN/Creatinine ratio 18 01/10/2018 10:26 AM    GFR est AA 87 01/10/2018 10:26 AM    GFR est non-AA 75 01/10/2018 10:26 AM    Calcium 9.1 01/10/2018 10:26 AM      Lab Results   Component Value Date/Time    Sodium 139 01/10/2018 10:26 AM    Potassium 3.8 01/10/2018 10:26 AM    Chloride 99 01/10/2018 10:26 AM    CO2 24 01/10/2018 10:26 AM    Anion gap 9 2015 08:29 PM    Glucose 85 01/10/2018 10:26 AM    BUN 18 01/10/2018 10:26 AM    Creatinine 1.00 01/10/2018 10:26 AM    BUN/Creatinine ratio 18 01/10/2018 10:26 AM    GFR est AA 87 01/10/2018 10:26 AM    GFR est non-AA 75 01/10/2018 10:26 AM    Calcium 9.1 01/10/2018 10:26 AM    Bilirubin, total 0.3 2015 08:29 PM    ALT (SGPT) 18 2015 08:29 PM    AST (SGOT) 15 2015 08:29 PM    Alk. phosphatase 57 2015 08:29 PM    Protein, total 7.9 2015 08:29 PM    Albumin 3.5 2015 08:29 PM    Globulin 4.4 (H) 2015 08:29 PM    A-G Ratio 0.8 (L) 2015 08:29 PM      Lab Results   Component Value Date/Time    Hemoglobin A1c 5.6 2016 08:19 AM        EK/30/17: Sinus  Rhythm; -  Nonspecific T-abnormality.    18; NSR, NSST changes, QTc 439 ms, QRS 90 ms      Margaret Rodriguez [address] [address]  [phone]  24 hour VAD/HF Pager: [phone]

## 2018-03-29 NOTE — MR AVS SNAPSHOT
[address] [place] 
[phone] Patient: Tuan Man MRN: [id number] BAR:5/30/[birth year]323 Visit Information Date & Time Provider Department Dept. Phone Encounter #  
 3/29/2018  1:00 PM Sid Devine 3. [phone] [id number] Follow-up Instructions Return in about 1 month (around 4/29/2018). Your Appointments 5/1/2018 10:15 AM  
ESTABLISHED PATIENT with Mary Winn MD  
[facility]) Appt Note: 2 MO. F/U; 2 MO. F/U  
 [address] 350 [facility]  
[phone] [address] 5/18/2018  8:00 AM  
PROCEDURE with [facility] [address] [facility]) Appt Note: NOT AN OFFICE VISIT - REMOTE BIO ICD  
 [address]  
[phone] [address] Upcoming Health Maintenance Date Due  
 PAP AKA CERVICAL CYTOLOGY 9/24/2007 Influenza Age 5 to Adult 8/1/2017 DTaP/Tdap/Td series (3 - Td) 5/25/2026 Allergies as of 3/29/2018  Review Complete On: 3/29/2018 By: WILMER Devine Severity Noted Reaction Type Reactions Other Medication  01/24/2013   Side Effect Hives Allergic, to dust mites, molds Pollen Extracts  05/12/2015    Hives Current Immunizations  Reviewed on 5/31/2016 Name Date Influenza Vaccine 10/15/2015,  Deferred (Patient Refused) Influenza Vaccine Intradermal PF 1/16/2017 Pneumococcal Polysaccharide (PPSV-23) 10/15/2015 TDAP Vaccine 8/20/2011 Tdap 5/25/2016  9:21 PM  
  
 Not reviewed this visit You Were Diagnosed With   
  
 Codes Comments  Systolic congestive heart failure with reduced left ventricular function, NYHA class 2 (HCC)    -  Primary ICD-10-CM: I50.20 ICD-9-CM: 428.20, 428.0 Cardiomyopathy, peripartum, postpartum     ICD-10-CM: O90.3 ICD-9-CM: 674.54 Essential hypertension     ICD-10-CM: I10 
ICD-9-CM: 401.9 Vitamin D deficiency     ICD-10-CM: E55.9 ICD-9-CM: 268.9 SOB (shortness of breath)     ICD-10-CM: R06.02 
ICD-9-CM: 786.05   
 ICD (implantable cardioverter-defibrillator) in place     ICD-10-CM: Z95.810 ICD-9-CM: V45.02 Snoring     ICD-10-CM: R06.83 
ICD-9-CM: 786.09 Systolic CHF, chronic (HCC)     ICD-10-CM: I50.22 ICD-9-CM: 428.22, 428.0 EF 40-45% Vitals BP Pulse Temp Resp Height(growth percentile) Weight(growth percentile) 114/76 (BP 1 Location: Left arm, BP Patient Position: Sitting) 74 98.4 °F (36.9 °C) (Oral) 20 5' 4\" (1.626 m) 181 lb 6.4 oz (82.3 kg) SpO2 BMI OB Status Smoking Status 99% 31.14 kg/m2 Having regular periods Never Smoker Vitals History BMI and BSA Data Body Mass Index Body Surface Area  
 31.14 kg/m 2 1.93 m 2 Preferred Pharmacy Pharmacy Name Phone [address] [facility] [phone] Your Updated Medication List  
  
   
This list is accurate as of 3/29/18  2:12 PM.  Always use your most recent med list.  
  
  
  
  
 carvedilol 25 mg tablet Commonly known as:  Amira Locus Take 1 Tab by mouth two (2) times daily (with meals). Indications: Chronic Heart Failure COQ10 (UBIQUINOL) PO Take 100 mg by mouth two (2) times a day. fluticasone 50 mcg/actuation nasal spray Commonly known as:  Cheron Elsie 2 Sprays by Both Nostrils route daily. levocetirizine 5 mg tablet Commonly known as:  Reginaldo  Take 1 Tab by mouth daily. valsartan 80 mg tablet Commonly known as:  DIOVAN Take 1 Tab by mouth two (2) times a day. Indications: chronic heart failure VITAMIN D3 PO Take  by mouth daily. Prescriptions Sent to Pharmacy Refills  
 valsartan (DIOVAN) 80 mg tablet 3 Sig: Take 1 Tab by mouth two (2) times a day. Indications: chronic heart failure Class: Normal  
 Pharmacy: [email or web address] [address] [facility] [place], [place] #: [phone] Route: Oral  
  
We Performed the Following METABOLIC PANEL, BASIC [13336 CPT(R)] NT-PRO BNP [id number] CPT(R)] VITAMIN D, 25-HYDROXY, TOTAL [[id number] Custom] Follow-up Instructions Return in about 1 month (around 4/29/2018). Patient Instructions 1. Increase valsartan to 80 mg twice a day- follow you blood pressure 2. Continue other meds 3. Increase exercise with goal of 30 minutes most days of the week 4. Labs today Continue daily weights (in the morning, after passing your urine). Notify HF team of overnight weight gains > 2 lbs or weekly >5 lbs or if any of the following Sx. Continue to limit sodium intake & monitor your fluid intake (not to exceed 2L per day). Call us for any problems,  questions, or concerns. Follow up 1 month- sooner if needed Introducing [facility]! Rachel Jackson introduces [employer] patient portal. Now you can access parts of your medical record, email your doctor's office, and request medication refills online. 1. In your internet browser, go to [email or web address] 2. Click on the First Time User? Click Here link in the Sign In box. You will see the New Member Sign Up page. 3. Enter your [employer] Access Code exactly as it appears below. You will not need to use this code after youve completed the sign-up process. If you do not sign up before the expiration date, you must request a new code. · [employer] Access Code: [email or web address] Expires: 4/10/2018 11:05 AM 
 
4.  Enter the last four digits of your Social Security Number (xxxx) and Date of Birth (mm/dd/yyyy) as indicated and click Submit. You will be taken to the next sign-up page. 5. Create a Language123 ID. This will be your Language[employer] login ID and cannot be changed, so think of one that is secure and easy to remember. 6. Create a [employer] password. You can change your password at any time. 7. Enter your Password Reset Question and Answer. This can be used at a later time if you forget your password. 8. Enter your e-mail address. You will receive e-mail notification when new information is available in [address] 9. Click Sign Up. You can now view and download portions of your medical record. 10. Click the Download Summary menu link to download a portable copy of your medical information. If you have questions, please visit the Frequently Asked Questions section of the Language[employer] website. Remember, Language[employer] is NOT to be used for urgent needs. For medical emergencies, dial 911. Now available from your iPhone and Android! Please provide this summary of care documentation to your next provider. Your primary care clinician is listed as NONE. If you have any questions after today's visit, please call [phone].

## 2018-04-02 ENCOUNTER — TELEPHONE (OUTPATIENT)
Dept: CARDIOLOGY CLINIC | Age: 33
End: 2018-04-02

## 2018-04-02 LAB
25(OH)D3 SERPL-MCNC: 42 NG/ML
BUN SERPL-MCNC: 13 MG/DL (ref 6–20)
BUN/CREAT SERPL: 15 (ref 9–23)
CALCIUM SERPL-MCNC: 8.7 MG/DL (ref 8.7–10.2)
CHLORIDE SERPL-SCNC: 103 MMOL/L (ref 96–106)
CO2 SERPL-SCNC: 21 MMOL/L (ref 18–29)
CREAT SERPL-MCNC: 0.87 MG/DL (ref 0.57–1)
GFR SERPLBLD CREATININE-BSD FMLA CKD-EPI: 103 ML/MIN/1.73
GFR SERPLBLD CREATININE-BSD FMLA CKD-EPI: 89 ML/MIN/1.73
GLUCOSE SERPL-MCNC: 81 MG/DL (ref 65–99)
NT-PROBNP SERPL-MCNC: 80 PG/ML (ref 0–130)
POTASSIUM SERPL-SCNC: 3.9 MMOL/L (ref 3.5–5.2)
SODIUM SERPL-SCNC: 142 MMOL/L (ref 134–144)

## 2018-04-02 NOTE — TELEPHONE ENCOUNTER
----- Message from WILMER Brian sent at 4/2/2018 11:48 AM EDT -----  Labs stable  Continue current plan    I called patient and reviewed lab results, also put results in mail to her per her request.  She states understanding and has no further questions.

## 2018-04-04 ENCOUNTER — TELEPHONE (OUTPATIENT)
Dept: CARDIOLOGY CLINIC | Age: 33
End: 2018-04-04

## 2018-04-04 NOTE — TELEPHONE ENCOUNTER
----- Message from WILMER Bass sent at 4/3/2018  4:53 PM EDT -----  Bmp stable   proBNP pending   thank you      I called patient and reviewed the rest of her lab results. She states understanding.

## 2018-05-01 ENCOUNTER — OFFICE VISIT (OUTPATIENT)
Dept: CARDIOLOGY CLINIC | Age: 33
End: 2018-05-01

## 2018-05-01 VITALS
DIASTOLIC BLOOD PRESSURE: 74 MMHG | WEIGHT: 176 LBS | SYSTOLIC BLOOD PRESSURE: 108 MMHG | RESPIRATION RATE: 12 BRPM | BODY MASS INDEX: 30.05 KG/M2 | OXYGEN SATURATION: 100 % | HEART RATE: 84 BPM | HEIGHT: 64 IN

## 2018-05-01 DIAGNOSIS — Z95.810 ICD (IMPLANTABLE CARDIOVERTER-DEFIBRILLATOR) IN PLACE: ICD-10-CM

## 2018-05-01 DIAGNOSIS — I50.22 SYSTOLIC CHF, CHRONIC (HCC): Primary | ICD-10-CM

## 2018-05-01 DIAGNOSIS — I10 ESSENTIAL HYPERTENSION: ICD-10-CM

## 2018-05-01 NOTE — PROGRESS NOTES
[facility]   [address] [facility], [address]                                          NEW PATIENT HPI/FOLLOW-UP      NAME:  Chaz Scanlon   :   1985   MRN:   [id number]   PCP:  None           Subjective: The patient is a 33y.o. year old female  who returns for a routine follow-up. Since the last visit, patient reports no new symptoms. Followed closely by [facility]. Tolerating increase in Valsartan to BID by [facility]. Denies change in exercise tolerance, chest pain, edema, medication intolerance, palpitations, shortness of breath, PND/orthopnea wheezing, sputum, syncope, dizziness or light headedness. Doing satisfactorily. Review of Systems  General: Pt denies excessive weight gain or loss. Pt is able to conduct ADL's. Respiratory: Denies shortness of breath, MOULTON, wheezing or stridor.   Cardiovascular: Denies precordial pain, palpitations, edema or PND  Gastrointestinal: Denies poor appetite, indigestion, abdominal pain or blood in stool  Peripheral vascular: Denies claudication, leg cramps  Neuropsychiatric: Denies paresthesias,tingling,numbness,anxiety,depression,fatigue  Musculoskeletal: Denies pain,tenderness, soreness,swelling      Past Medical History:   Diagnosis Date    AICD (automatic cardioverter/defibrillator) present     Anemia NEC     Cardiomyopathy, peripartum, postpartum 2/10/2015    ECHO 2013: EF 50%, mild MR/TR  ECHO 2015: EF 30-35% ECHO 2015:LVD, EF 25-30%, [address] ECHO 2016: EF 50% ECHO 2017: EF 40-45%, mild DHK, LVH, RV mild dilated ECHO 2018: EF 50%, no WMA, wall thickness < no (.8-.9 cm), mil MR/TR,   CARDIAC MRI 2016: LVEF 50%, mild GHK, RVEF 60%   AICD : Biotronik     Contact dermatitis and other eczema, due to unspecified cause     hives    Hypertension     Systolic congestive heart failure with reduced left ventricular function, NYHA class 2 ([facility] [address] 3/29/2018     Patient Active Problem List    Diagnosis Date Noted    Systolic CHF, chronic ([facility]     Systolic congestive heart failure with reduced left ventricular function, [facility] 2018    HTN (hypertension) 2015    ICD (implantable cardioverter-defibrillator) in place 2015    Cardiomyopathy, peripartum, postpartum 02/10/2015    Vitamin D deficiency 2011    Snoring 2011      Past Surgical History:   Procedure Laterality Date    HX GYN  13        HX IMPLANTABLE CARDIOVERTER DEFIBRILLATOR  2015    [employer] #[id number]     Allergies   Allergen Reactions    Other Medication Hives     Allergic, to dust mites, molds    Pollen Extracts Hives      Family History   Problem Relation Age of Onset    Hypertension Mother     Hypertension Father     Diabetes Maternal Grandmother     Heart Disease Maternal Grandmother     Arthritis-osteo Maternal Grandfather     Asthma Brother     Arthritis-osteo Paternal Grandmother     Arthritis-osteo Paternal Grandfather       Social History     Social History    Marital status:      Spouse name: N/A    Number of children: 0    Years of education: N/A     Occupational History    prison analyist      Unknown     Social History Main Topics    Smoking status: Never Smoker    Smokeless tobacco: Never Used    Alcohol use No    Drug use: No      Comment: denies     Sexual activity: Yes     Partners: Male     Birth control/ protection: Pill     Other Topics Concern    Not on file     Social History Narrative    ** Merged History Encounter **           Current Outpatient Prescriptions   Medication Sig    valsartan (DIOVAN) 80 mg tablet Take 1 Tab by mouth two (2) times a day. Indications: chronic heart failure    carvedilol (COREG) 25 mg tablet Take 1 Tab by mouth two (2) times daily (with meals). Indications: Chronic Heart Failure    COQ10, UBIQUINOL, PO Take 100 mg by mouth two (2) times a day.  CHOLECALCIFEROL, VITAMIN D3, (VITAMIN D3 PO) Take  by mouth daily.     levocetirizine (XYZAL) 5 mg tablet Take 1 Tab by mouth daily.  fluticasone (FLONASE) 50 mcg/actuation nasal spray 2 Sprays by Both Nostrils route daily. No current facility-administered medications for this visit. I have reviewed the nurses notes, vitals, problem list, allergy list, medical history, family medical, social history and medications. Objective:     Physical Exam:     Vitals:    05/01/18 1038 05/01/18 1039   BP: 109/78 108/74   Pulse: 89 84   Resp: 12    SpO2: 100%    Weight: 176 lb (79.8 kg)    Height: 5' 4\" (1.626 m)     Body mass index is 30.21 kg/(m^2). General: Well developed, in no acute distress. HEENT: No carotid bruits, no JVD, trach is midline. Heart:  Normal S1/S2 negative S3 or S4. Regular, no murmur, gallop or rub.   Respiratory: Clear bilaterally, no wheezing or rales  Abdomen:   Soft, non-tender, bowel sounds are active.   Extremities:  No edema, normal cap refill, no cyanosis. Neuro: A&Ox3, speech clear, gait stable. Skin: Skin color is normal. No rashes or lesions. No diaphoresis.   Vascular: 2+ pulses symmetric in all extremities        Data Review:       Cardiographics:    EKG: not done today    Cardiology Labs:    Results for orders placed or performed during the hospital encounter of 09/16/15   EKG, 12 LEAD, INITIAL   Result Value Ref Range    Ventricular Rate 73 BPM    Atrial Rate 73 BPM    P-R Interval 138 ms    QRS Duration 82 ms    Q-T Interval 448 ms    QTC Calculation (Bezet) 493 ms    Calculated P Axis 51 degrees    Calculated R Axis 44 degrees    Calculated T Axis 2 degrees    Diagnosis       Normal sinus rhythm  Normal ekg  Confirmed by Erasmo Boone ([id number]) on 9/17/2015 7:29:30 AM         Lab Results   Component Value Date/Time    Cholesterol, total 139 06/02/2016 08:19 AM    HDL Cholesterol 45 06/02/2016 08:19 AM    LDL, calculated 76 06/02/2016 08:19 AM    Triglyceride 89 06/02/2016 08:19 AM       Lab Results   Component Value Date/Time Sodium 142 03/29/2018 12:00 AM    Potassium 3.9 03/29/2018 12:00 AM    Chloride 103 03/29/2018 12:00 AM    CO2 21 03/29/2018 12:00 AM    Anion gap 9 09/16/2015 08:29 PM    Glucose 81 03/29/2018 12:00 AM    BUN 13 03/29/2018 12:00 AM    Creatinine 0.87 03/29/2018 12:00 AM    BUN/Creatinine ratio 15 03/29/2018 12:00 AM    GFR est  03/29/2018 12:00 AM    GFR est non-AA 89 03/29/2018 12:00 AM    Calcium 8.7 03/29/2018 12:00 AM    Bilirubin, total 0.3 09/16/2015 08:29 PM    AST (SGOT) 15 09/16/2015 08:29 PM    Alk. phosphatase 57 09/16/2015 08:29 PM    Protein, total 7.9 09/16/2015 08:29 PM    Albumin 3.5 09/16/2015 08:29 PM    Globulin 4.4 (H) 09/16/2015 08:29 PM    A-G Ratio 0.8 (L) 09/16/2015 08:29 PM    ALT (SGPT) 18 09/16/2015 08:29 PM          Assessment:       ICD-10-CM ICD-9-CM    1. Systolic CHF, chronic (HCC) I50.22 428.22 2D ECHO COMPLETE ADULT (TTE) W OR WO CONTR     428.0    2. Essential hypertension I10 401.9 2D ECHO COMPLETE ADULT (TTE) W OR WO CONTR   3. Cardiomyopathy, peripartum, postpartum O90.3 674.54 2D ECHO COMPLETE ADULT (TTE) W OR WO CONTR   4. ICD (implantable cardioverter-defibrillator) in place Z95.810 V45.02 2D ECHO COMPLETE ADULT (TTE) W OR WO CONTR         Discussion: Patient presents at this time stable from a cardiac perspective. CSHF compensated and meds being slowly fine-tuned and tolerating. Labs holding steady. Pleased with present cardiac status. Will repeat echo in mid-June. Plan: 1. Continue same meds. Lipid profile and labs followed by PCP--at goal.. 2.Encouraged to exercise to tolerance, lose weight and follow low fat, low cholesterol, low sodium predominantly Plant-based (consider Mediterranean) diet. Call with questions or concerns. Will follow up any test results by phone and/or f/u here in office if needed. Елена Marquez 3.Follow up: 6 months as followed by [facility] closely for now.     I have discussed the diagnosis with the patient and the intended plan as seen in the above orders. The patient has received an after-visit summary and questions were answered concerning future plans. I have discussed any concerning medication side effects and warnings with the patient as well.     Brad Sunshine MD  5/1/2018

## 2018-05-01 NOTE — MR AVS SNAPSHOT
[address] [facility] 
 
 
 [address] [facility] 
[phone] Patient: Rick Bruce MRN: [id number] YFX:[birth year] Visit Information Date & Time Provider Department Dept. Phone Encounter #  
 5/1/2018 10:15 AM MD [facility] [phone] Follow-up Instructions Return in about 6 months (around 11/1/2018). Follow-up and Disposition History Your Appointments 5/18/2018  8:00 AM  
PROCEDURE with [facility]) Appt Note: NOT AN OFFICE VISIT - REMOTE BIO ICD  
 [address]  
[phone] [address]  
  
    
 6/4/2018  1:00 PM  
Follow Up with BRANDY GruberP [address] [facility] ([facility]) [place] [address]  
[phone]  
  
   
 [address] [place] [address]  
  
    
 11/6/2018  9:00 AM  
ESTABLISHED PATIENT with MD [facility]) Appt Note: 6 MO. F/U  
 [address] [facility]  
[phone] [address] Upcoming Health Maintenance Date Due  
 PAP AKA CERVICAL CYTOLOGY 9/24/2007 Influenza Age 5 to Adult 8/1/2018 DTaP/Tdap/Td series (3 - Td) 5/25/2026 Allergies as of 5/1/2018  Review Complete On: 5/1/2018 By: Adri Suazo MD  
  
 Severity Noted Reaction Type Reactions Other Medication  01/24/2013   Side Effect Hives Allergic, to dust mites, molds Pollen Extracts  05/12/2015    Hives Current Immunizations  Reviewed on 5/31/2016 Name Date Influenza Vaccine 10/15/2015,  Deferred (Patient Refused) Influenza Vaccine Intradermal PF 1/16/2017 Pneumococcal Polysaccharide (PPSV-23) 10/15/2015 TDAP Vaccine 8/20/2011 Tdap 5/25/2016  9:21 PM  
  
 Not reviewed this visit You Were Diagnosed With   
  
 Codes Comments Systolic CHF, chronic (HCC)    -  Primary ICD-10-CM: B52.46 ICD-9-CM: 428.22, 428.0 Essential hypertension     ICD-10-CM: I10 
ICD-9-CM: 401.9 Cardiomyopathy, peripartum, postpartum     ICD-10-CM: O90.3 ICD-9-CM: 674.54   
 ICD (implantable cardioverter-defibrillator) in place     ICD-10-CM: Z95.810 ICD-9-CM: V45.02 Vitals BP Pulse Resp Height(growth percentile) Weight(growth percentile) SpO2  
 108/74 (BP 1 Location: Right arm, BP Patient Position: Sitting) 84 12 5' 4\" (1.626 m) 176 lb (79.8 kg) 100% BMI OB Status Smoking Status 30.21 kg/m2 Having regular periods Never Smoker Vitals History BMI and BSA Data Body Mass Index Body Surface Area  
 30.21 kg/m 2 1.9 m 2 Preferred Pharmacy Pharmacy Name Phone [address], [address] [facility] [phone] Your Updated Medication List  
  
   
This list is accurate as of 5/1/18 11:56 AM.  Always use your most recent med list.  
  
  
  
  
 carvedilol 25 mg tablet Commonly known as:  David Beets Take 1 Tab by mouth two (2) times daily (with meals). Indications: Chronic Heart Failure COQ10 (UBIQUINOL) PO Take 100 mg by mouth two (2) times a day. fluticasone 50 mcg/actuation nasal spray Commonly known as:  Beverlyn Maker 2 Sprays by Both Nostrils route daily. levocetirizine 5 mg tablet Commonly known as:  Jim Solar Take 1 Tab by mouth daily. valsartan 80 mg tablet Commonly known as:  DIOVAN Take 1 Tab by mouth two (2) times a day. Indications: chronic heart failure VITAMIN D3 PO Take  by mouth daily. Follow-up Instructions Return in about 6 months (around 11/1/2018). To-Do List   
 06/01/2018 ECHO:  2D ECHO COMPLETE ADULT (TTE) W OR WO CONTR Introducing [facility] HE[facility]! [employer] introduces [employer] patient portal. Now you can access parts of your medical record, email your doctor's office, and request medication refills online. 1. In your internet browser, go to [email or web address] 2. Click on the First Time User? Click Here link in the Sign In box. You will see the New Member Sign Up page. 3. Enter your [employer] Access Code exactly as it appears below. You will not need to use this code after youve completed the sign-up process. If you do not sign up before the expiration date, you must request a new code. · [employer] Access Code: [email or web address] Expires: 7/30/2018 11:03 AM 
 
4. Enter the last four digits of your Social Security Number (xxxx) and Date of Birth (mm/dd/yyyy) as indicated and click Submit. You will be taken to the next sign-up page. 5. Create a [employer] ID. This will be your [employer] login ID and cannot be changed, so think of one that is secure and easy to remember. 6. Create a [employer] password. You can change your password at any time. 7. Enter your Password Reset Question and Answer. This can be used at a later time if you forget your password. 8. Enter your e-mail address. You will receive e-mail notification when new information is available in [address] 9. Click Sign Up. You can now view and download portions of your medical record. 10. Click the Download Summary menu link to download a portable copy of your medical information. If you have questions, please visit the Frequently Asked Questions section of the [employer] website. Remember, [employer] is NOT to be used for urgent needs. For medical emergencies, dial 911. Now available from your iPhone and Android! Please provide this summary of care documentation to your next provider. Your primary care clinician is listed as NONE.  If you have any questions after today's visit, please call [phone].

## 2018-05-01 NOTE — PROGRESS NOTES
Chief Complaint   Patient presents with    Hypotension     no other complaints. 1. Have you been to the ER, urgent care clinic since your last visit? Hospitalized since your last visit? No    2. Have you seen or consulted any other health care providers outside of the [address] [place] [address] since your last visit? Include any pap smears or colon screening.  No

## 2018-05-18 ENCOUNTER — OFFICE VISIT (OUTPATIENT)
Dept: CARDIOLOGY CLINIC | Age: 33
End: 2018-05-18

## 2018-05-18 DIAGNOSIS — I50.20 SYSTOLIC CONGESTIVE HEART FAILURE WITH REDUCED LEFT VENTRICULAR FUNCTION, NYHA CLASS 2 (HCC): ICD-10-CM

## 2018-05-18 DIAGNOSIS — Z95.810 ICD (IMPLANTABLE CARDIOVERTER-DEFIBRILLATOR) IN PLACE: Primary | ICD-10-CM

## 2018-06-14 ENCOUNTER — HOSPITAL ENCOUNTER (OUTPATIENT)
Dept: NON INVASIVE DIAGNOSTICS | Age: 33
Discharge: HOME | End: 2018-06-14
Attending: INTERNAL MEDICINE
Payer: COMMERCIAL

## 2018-06-14 DIAGNOSIS — I10 ESSENTIAL HYPERTENSION: ICD-10-CM

## 2018-06-14 DIAGNOSIS — Z95.810 ICD (IMPLANTABLE CARDIOVERTER-DEFIBRILLATOR) IN PLACE: ICD-10-CM

## 2018-06-14 DIAGNOSIS — I50.22 SYSTOLIC CHF, CHRONIC (HCC): ICD-10-CM

## 2018-06-14 PROCEDURE — 93306 TTE W/DOPPLER COMPLETE: CPT

## 2018-06-15 NOTE — PROGRESS NOTES
Spoke with pt . Verified 2 identifers. Told pt per Kamari Huertas :Echo looks good; she needs to be encouraged to stay on her current meds to keep her heart function in a good place. Patient verbalize understanding .

## 2018-06-19 ENCOUNTER — TELEPHONE (OUTPATIENT)
Dept: CARDIOLOGY CLINIC | Age: 33
End: 2018-06-19

## 2018-06-19 NOTE — TELEPHONE ENCOUNTER
Telephone Call RE:  Appointment reminder     Outcome:     [x] Patient confirmed appointment   [] Patient rescheduled appointment for    [] Unable to reach   [] Left message              [] Other:       Ana Lane

## 2018-06-20 ENCOUNTER — OFFICE VISIT (OUTPATIENT)
Dept: CARDIOLOGY CLINIC | Age: 33
End: 2018-06-20

## 2018-06-20 VITALS
HEIGHT: 64 IN | OXYGEN SATURATION: 98 % | WEIGHT: 173.6 LBS | BODY MASS INDEX: 29.64 KG/M2 | SYSTOLIC BLOOD PRESSURE: 114 MMHG | RESPIRATION RATE: 20 BRPM | HEART RATE: 80 BPM | DIASTOLIC BLOOD PRESSURE: 82 MMHG | TEMPERATURE: 98 F

## 2018-06-20 DIAGNOSIS — Z95.810 ICD (IMPLANTABLE CARDIOVERTER-DEFIBRILLATOR) IN PLACE: ICD-10-CM

## 2018-06-20 DIAGNOSIS — R06.00 DYSPNEA, UNSPECIFIED TYPE: ICD-10-CM

## 2018-06-20 DIAGNOSIS — I10 ESSENTIAL HYPERTENSION: ICD-10-CM

## 2018-06-20 DIAGNOSIS — I50.22 CHRONIC SYSTOLIC CONGESTIVE HEART FAILURE (HCC): ICD-10-CM

## 2018-06-20 DIAGNOSIS — E55.9 VITAMIN D DEFICIENCY: ICD-10-CM

## 2018-06-20 RX ORDER — CHLORTHALIDONE 25 MG/1
12.5 TABLET ORAL
COMMUNITY
End: 2018-06-20 | Stop reason: SDUPTHER

## 2018-06-20 RX ORDER — CHLORTHALIDONE 25 MG/1
12.5 TABLET ORAL
Qty: 30 TAB | Refills: 0 | Status: SHIPPED | OUTPATIENT
Start: 2018-06-20 | End: 2021-03-16 | Stop reason: SDUPTHER

## 2018-06-20 NOTE — LETTER
6/20/2018 1:49 PM 
 
Patient:  Yoselin Lee YOB: 1985 Date of Visit: 6/20/2018 Dear Demetrius Cox MD 
[address] [address] VIA In Basket Kait Joseph MD 
[address] [address] [address] VIA In Basket Dia Veloz MD 
[address] [facility] [address] [address] VIA In Basket 
 : Thank you for referring Ms. Allegra Doe to me for evaluation/treatment. Below are the relevant portions of my assessment and plan of care. [place] Note DOS:   6/20/2018 NAME:  Yoselin Lee MRN:   [id number] REFERRING PROVIDER:  Taran Mccabe MD 
PRIMARY CARE PHYSICIAN: None PRIMARY CARDIOLOGIST: Taran Mccabe MD 
EP: Dr. Néstor Vazquez  
 
 
 
 
Chief Complaint:  
Chief Complaint Patient presents with  Follow-up HPI: 33y.o. year old female with a history of HFrEF in the setting of NICM with PPCM and hx HTN who presents for follow up of her chronic systolic heart failure. She was last seen 3 months ago at which time her valsartan was increased. proBNP was 80. Since her last visit she has seen Dr. Kenrick Wade and undergone echo that showed normal EF. She is feeling good and has lost ~ 10 # since last year. She is watching her calories, sugar, and exercising regularly (briskly walking 30-40 minutes 3-5 times per week). She denies MOULTON, notes some fatigue during the day. She had a (-) sleep study several years ago. She has been compliant with her meds, eats out almost daily, does not add salt. Uses chlorthalidone 12.5 mg ~ once per week for weekly for fluid retention and weight gain She denies exertional chest pain,  PND, orthopnea, edema, palpitations, presyncope, and syncope, AICD shocks.   
 
She admitted to a decline in her LVEF when she was non compliant with her medications. She remains compliant with meds, daily wts, and is not sexually active. She was taking OCA which she stopped in April. Kim Turcios lives with her 3 yo daughter,  works at desk job for the state  Rare etoh, never smoked Impression / Plan: 1. HFrEF, improved EF to 55-60%- Stage C, NYHA Class II, d/t PPCM Continue Valsartan to 80 mg BID, Coreg 25 mg BID Prn low dose chlorthalidone for weight gain Counseled patient regarding birth control and risks of worsening HF and death with recurrent pregnancy She is not currently sexually active- previously used OCA Counseled patient regarding the risk of birth defects with taking ARB We will see her in September and then stagger biannual visits with Dr. Rajat Ibrahim so that she is seen every quarter by one of us 2. Risk of SCD - s/p AICD No shocks Last check 2018 brief SVT - asx 3. SVT- asx found on AICD Ck labs Cont BB Followed by EP 4. HTN- BP controlled on current therapy Reveiw of Systems:   
General:  Denies fever or chills HEENT: negative Pulmonary: no cough, shortness of breath, or wheezing Cardiac: Per HPI 
GI:  no abdominal pain, change in bowel habits, or black or bloody stools Musculo: negative Neuro: no TIA or stroke symptoms Skin:  negative Allergies: REMINDER:DOCUMENT ALLERGIES IN ALLERGY ACTIVITY Psych: negative CARDIAC EVALUATION  
ECHO : EF 50%, mild MR/TR  
ECHO 2015: EF 30-35% ECHO 2015:LVD, EF 25-30%, [address] 
ECHO 2016: EF 50% ECHO 2017: EF 40-45%, mild DHK, LVH, RV mild dilated ECHO 2018: EF 50%, no WMA, wall thickness < normal (.8-.9 cm), mild MR/TR,  
ECHO 18: LVD, EF 55-60%, mild mitral annular dilation, trivial MR/TR  
 
 
CARDIAC MRI 2016: LVEF 50%, mild GHK, RVEF 60% AICD : Biotronik EK/30/17: Sinus  Rhythm; -  Nonspecific T-abnormality. 18; NSR, NSST changes, QTc 439 ms, QRS 90 ms History: 
Past Medical History: Diagnosis Date  AICD (automatic cardioverter/defibrillator) present  Anemia NEC  Cardiomyopathy, peripartum, postpartum 2/10/2015 ECHO : EF 50%, mild MR/TR  ECHO 2015: EF 30-35% ECHO 2015:LVD, EF 25-30%, [address] ECHO 2016: EF 50% ECHO 2017: EF 40-45%, mild DHK, LVH, RV mild dilated ECHO 2018: EF 50%, no WMA, wall thickness < no (.8-.9 cm), mil MR/TR,   CARDIAC MRI 2016: LVEF 50%, mild GHK, RVEF 60%   AICD : Biotronik  Contact dermatitis and other eczema, due to unspecified cause   
 hives  Hypertension  Systolic congestive heart failure with reduced left ventricular function, NYHA class 2 ([address] 3/29/2018 Past Surgical History:  
Procedure Laterality Date  HX GYN  13   HX IMPLANTABLE CARDIOVERTER DEFIBRILLATOR  2015 Manus Abt #[id number] Social History Social History  Marital status:  Spouse name: N/A  
 Number of children: 0  
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   
  Unknown Social History Main Topics  Smoking status: Never Smoker  Smokeless tobacco: Never Used  Alcohol use No  
 Drug use: No  
   Comment: denies  Sexual activity: Yes  
  Partners: Male Birth control/ protection: Pill Other Topics Concern  Not on file Social History Narrative ** Merged History Encounter ** Family History Problem Relation Age of Onset  Hypertension Mother  Hypertension Father  Diabetes Maternal Grandmother  Heart Disease Maternal Grandmother  Arthritis-osteo Maternal Grandfather  Asthma Brother  Arthritis-osteo Paternal Grandmother  Arthritis-osteo Paternal Grandfather Current Medications:  
Current Outpatient Prescriptions Medication Sig Dispense Refill  chlorthalidone (HYGROTEN) 25 mg tablet Take 12.5 mg by mouth daily as needed.  valsartan (DIOVAN) 80 mg tablet Take 1 Tab by mouth two (2) times a day. Indications: chronic heart failure 60 Tab 3  carvedilol (COREG) 25 mg tablet Take 1 Tab by mouth two (2) times daily (with meals). Indications: Chronic Heart Failure 60 Tab 6  
 COQ10, UBIQUINOL, PO Take 100 mg by mouth two (2) times a day.  CHOLECALCIFEROL, VITAMIN D3, (VITAMIN D3 PO) Take  by mouth daily.  levocetirizine (XYZAL) 5 mg tablet Take 1 Tab by mouth daily. 30 Tab 5  
 fluticasone (FLONASE) 50 mcg/actuation nasal spray 2 Sprays by Both Nostrils route daily. 1 Bottle 5 Allergies: Allergies Allergen Reactions  Other Medication Hives Allergic, to dust mites, molds  Pollen Extracts Hives Admission Weight: Last Weight Weight: 173 lb 9.6 oz (78.7 kg) Weight: 173 lb 9.6 oz (78.7 kg) Physical Exam:  
Vitals:   
Visit Vitals  /82 (BP 1 Location: Left arm, BP Patient Position: Sitting)  Pulse 80  Temp 98 °F (36.7 °C) (Oral)  Resp 20  
 Ht 5' 4\" (1.626 m)  Wt 173 lb 9.6 oz (78.7 kg)  SpO2 98%  BMI 29.8 kg/m2 General:  Well developed AA female, well nourished,  alert, cooperative, in NAD HEENT: Normocephalic, atraumatic, PERRLA,  and Sclera clear, anicteric Neck:  supple, no significant adenopathy, carotids upstroke normal bilaterally, no bruits CVP:  8 cm  ( - ) HJR Cardiac: PMI diminished, AICD left infraclavicular space, RRR,  S1, S2 normal, no murmur, click, rub or gallop Chest: breath sounds clear and equal bilaterally Abdomen: soft, non-tender. Bowel sounds normal. No masses, no organomegaly. Extremity: extremities normal, atraumatic, no cyanosis or edema Neuro: A&O x 3 no focal deficits Skin:   no rashes Recent Labs:  
Lab Results Component Value Date/Time WBC 9.5 09/16/2015 08:29 PM  
 HGB 12.2 09/16/2015 08:29 PM  
 HCT 36.4 09/16/2015 08:29 PM  
 PLATELET 302 37/74/2432 08:29 PM  
 MCV 92.9 09/16/2015 08:29 PM  
 
Lab Results Component Value Date/Time TSH 1.840 06/02/2016 08:19 AM  
  
Lab Results Component Value Date/Time NT pro-BNP 1400 (H) 09/17/2013 10:17 PM  
  
Lab Results Component Value Date/Time Sodium 142 03/29/2018 12:00 AM  
 Potassium 3.9 03/29/2018 12:00 AM  
 Chloride 103 03/29/2018 12:00 AM  
 CO2 21 03/29/2018 12:00 AM  
 Anion gap 9 09/16/2015 08:29 PM  
 Glucose 81 03/29/2018 12:00 AM  
 BUN 13 03/29/2018 12:00 AM  
 Creatinine 0.87 03/29/2018 12:00 AM  
 BUN/Creatinine ratio 15 03/29/2018 12:00 AM  
 GFR est  03/29/2018 12:00 AM  
 GFR est non-AA 89 03/29/2018 12:00 AM  
 Calcium 8.7 03/29/2018 12:00 AM  
  
Lab Results Component Value Date/Time Sodium 142 03/29/2018 12:00 AM  
 Potassium 3.9 03/29/2018 12:00 AM  
 Chloride 103 03/29/2018 12:00 AM  
 CO2 21 03/29/2018 12:00 AM  
 Anion gap 9 09/16/2015 08:29 PM  
 Glucose 81 03/29/2018 12:00 AM  
 BUN 13 03/29/2018 12:00 AM  
 Creatinine 0.87 03/29/2018 12:00 AM  
 BUN/Creatinine ratio 15 03/29/2018 12:00 AM  
 GFR est  03/29/2018 12:00 AM  
 GFR est non-AA 89 03/29/2018 12:00 AM  
 Calcium 8.7 03/29/2018 12:00 AM  
 Bilirubin, total 0.3 09/16/2015 08:29 PM  
 ALT (SGPT) 18 09/16/2015 08:29 PM  
 AST (SGOT) 15 09/16/2015 08:29 PM  
 Alk. phosphatase 57 09/16/2015 08:29 PM  
 Protein, total 7.9 09/16/2015 08:29 PM  
 Albumin 3.5 09/16/2015 08:29 PM  
 Globulin 4.4 (H) 09/16/2015 08:29 PM  
 A-G Ratio 0.8 (L) 09/16/2015 08:29 PM  
  
Lab Results Component Value Date/Time Hemoglobin A1c 5.6 06/02/2016 08:19 AM  
  
I have discussed the diagnosis with  Gigi Juárez and the intended plan as seen in the above orders. Questions were answered concerning future plans, and written instructions provided. I have discussed medication side effects and warnings with the patient as well. Thank you for allowing me to participate in the care of this delightful  [facility] . Please do not hesitate to call with any questions. Deborah Crump  RN, ACNP-BC, [facility] [address] [address] 
[phone] 
25 hour VAD/HF Pager: [phone] If you have questions, please do not hesitate to call me. I look forward to following Ms. Pedro Tim along with you. Sincerely, WILMER Chapa

## 2018-06-20 NOTE — PATIENT INSTRUCTIONS
Your doing great. . Congratulations on the weight loss    Continue current medications    Continue to exercise     Continue daily weights (in the morning, after passing your urine). Notify HF team of overnight weight gains > 2 lbs or weekly >5 lbs or if any of the following Sx. Continue to limit sodium intake & monitor your fluid intake. Stay hydrated. Call us for any problems,  questions, or concerns. See us in September and then we will stagger visits every 3 months with Dr. Meghan Meade.

## 2018-06-20 NOTE — COMMUNICATION BODY
[facility] Clinic Note      DOS:   6/20/2018  NAME:  Scooter Callahan    MRN:   [id number]   REFERRING PROVIDER:  Kristen Nix MD  PRIMARY CARE PHYSICIAN: None  PRIMARY CARDIOLOGIST: Kristen Nix MD  EP: Dr. Lorraine Bassett           Chief Complaint:   Chief Complaint   Patient presents with    Follow-up           HPI: 33y.o. year old female with a history of HFrEF in the setting of NICM with PPCM and hx HTN who presents for follow up of her chronic systolic heart failure. She was last seen 3 months ago at which time her valsartan was increased. proBNP was 80. Since her last visit she has seen Dr. Janki Salinas and undergone echo that showed normal EF. She is feeling good and has lost ~ 10 # since last year. She is watching her calories, sugar, and exercising regularly (briskly walking 30-40 minutes 3-5 times per week). She denies MOULTON, notes some fatigue during the day. She had a (-) sleep study several years ago. She has been compliant with her meds, eats out almost daily, does not add salt. Uses chlorthalidone 12.5 mg ~ once per week for weekly for fluid retention and weight gain      She denies exertional chest pain,  PND, orthopnea, edema, palpitations, presyncope, and syncope, AICD shocks. She admitted to a decline in her LVEF when she was non compliant with her medications. She remains compliant with meds, daily wts, and is not sexually active. She was taking OCA which she stopped in April.      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 HFrEF, improved EF to 55-60%- Stage C, NYHA Class II, d/t PPCM   Continue Valsartan to 80 mg BID, Coreg 25 mg BID   Prn low dose chlorthalidone for weight gain   Counseled patient regarding birth control and risks of worsening HF and death with recurrent pregnancy   She is not currently sexually active- previously used OCA   Counseled patient regarding the risk of birth defects with taking ARB   We will see her in September and then stagger biannual visits with Dr. Sofy Nick so that she is seen every quarter by one of us         2. Risk of SCD - s/p AICD   No shocks   Last check 2018 brief SVT - asx    3. SVT- asx found on AICD    Ck labs   Cont BB   Followed by EP     4. HTN- BP controlled on current therapy       Reveiw of Systems:    General:  Denies fever or chills   HEENT: negative  Pulmonary: no cough, shortness of breath, or wheezing  Cardiac: Per HPI  GI:  no abdominal pain, change in bowel habits, or black or bloody stools  Musculo: negative  Neuro: no TIA or stroke symptoms  Skin:  negative  Allergies: REMINDER:DOCUMENT ALLERGIES IN ALLERGY ACTIVITY  Psych: negative    CARDIAC EVALUATION   ECHO 2013: EF 50%, mild MR/TR   ECHO 2015: EF 30-35%  ECHO 2015:LVD, EF 25-30%, [address]  ECHO 2016: EF 50%  ECHO 2017: EF 40-45%, mild DHK, LVH, RV mild dilated   ECHO 2018: EF 50%, no WMA, wall thickness < normal (.8-.9 cm), mild MR/TR,   ECHO 18: LVD, EF 55-60%, mild mitral annular dilation, trivial MR/TR       CARDIAC MRI 2016: LVEF 50%, mild GHK, RVEF 60%      AICD : Biotronik      EK/30/17: Sinus  Rhythm; -  Nonspecific T-abnormality.    18; NSR, NSST changes, QTc 439 ms, QRS 90 ms         History:  Past Medical History:   Diagnosis Date    AICD (automatic cardioverter/defibrillator) present     Anemia NEC     Cardiomyopathy, peripartum, postpartum 2/10/2015    ECHO 2013: EF 50%, mild MR/TR  ECHO 2015: EF 30-35% ECHO 2015:LVD, EF 25-30%, [address] ECHO 2016: EF 50% ECHO 2017: EF 40-45%, mild DHK, LVH, RV mild dilated ECHO 2018: EF 50%, no WMA, wall thickness < no (.8-.9 cm), mil MR/TR,   CARDIAC MRI 2016: LVEF 50%, mild GHK, RVEF 60%   AICD 2015: Biotronik     Contact dermatitis and other eczema, due to unspecified cause     hives    Hypertension     Systolic congestive heart failure with reduced left ventricular function, NYHA class 2 ([facility] [address] 3/29/2018     Past Surgical History:   Procedure Laterality Date    HX GYN  13        HX IMPLANTABLE CARDIOVERTER DEFIBRILLATOR  2015    [employer] #[id number]     Social History     Social History    Marital status:      Spouse name: N/A    Number of children: 0    Years of education: N/A     Occupational History    shelter analyist      Unknown     Social History Main Topics    Smoking status: Never Smoker    Smokeless tobacco: Never Used    Alcohol use No    Drug use: No      Comment: denies     Sexual activity: Yes     Partners: Male     Birth control/ protection: Pill     Other Topics Concern    Not on file     Social History Narrative    ** Merged History Encounter **          Family History   Problem Relation Age of Onset    Hypertension Mother     Hypertension Father     Diabetes Maternal Grandmother     Heart Disease Maternal Grandmother     Arthritis-osteo Maternal Grandfather     Asthma Brother     Arthritis-osteo Paternal Grandmother     Arthritis-osteo Paternal Grandfather        Current Medications:   Current Outpatient Prescriptions   Medication Sig Dispense Refill    chlorthalidone (HYGROTEN) 25 mg tablet Take 12.5 mg by mouth daily as needed.  valsartan (DIOVAN) 80 mg tablet Take 1 Tab by mouth two (2) times a day. Indications: chronic heart failure 60 Tab 3    carvedilol (COREG) 25 mg tablet Take 1 Tab by mouth two (2) times daily (with meals). Indications: Chronic Heart Failure 60 Tab 6    COQ10, UBIQUINOL, PO Take 100 mg by mouth two (2) times a day.  CHOLECALCIFEROL, VITAMIN D3, (VITAMIN D3 PO) Take  by mouth daily.  levocetirizine (XYZAL) 5 mg tablet Take 1 Tab by mouth daily. 30 Tab 5    fluticasone (FLONASE) 50 mcg/actuation nasal spray 2 Sprays by Both Nostrils route daily. 1 Bottle 5       Allergies:    Allergies   Allergen Reactions    Other Medication Hives     Allergic, to dust mites, molds  Pollen Extracts Hives         Admission Weight: Last Weight   Weight: 173 lb 9.6 oz (78.7 kg) Weight: 173 lb 9.6 oz (78.7 kg)       Physical Exam:   Vitals:    Visit Vitals    /82 (BP 1 Location: Left arm, BP Patient Position: Sitting)    Pulse 80    Temp 98 °F (36.7 °C) (Oral)    Resp 20    Ht 5' 4\" (1.626 m)    Wt 173 lb 9.6 oz (78.7 kg)    SpO2 98%    BMI 29.8 kg/m2       General:  Well developed AA female, well nourished,  alert, cooperative, in NAD  HEENT: Normocephalic, atraumatic, PERRLA,  and Sclera clear, anicteric  Neck:  supple, no significant adenopathy, carotids upstroke normal bilaterally, no bruits  CVP:  8 cm  ( - ) HJR  Cardiac: PMI diminished, AICD left infraclavicular space, RRR,  S1, S2 normal, no murmur, click, rub or gallop  Chest: breath sounds clear and equal bilaterally  Abdomen: soft, non-tender. Bowel sounds normal. No masses, no organomegaly.   Extremity: extremities normal, atraumatic, no cyanosis or edema  Neuro: A&O x 3 no focal deficits   Skin:   no rashes    Recent Labs:   Lab Results   Component Value Date/Time    WBC 9.5 09/16/2015 08:29 PM    HGB 12.2 09/16/2015 08:29 PM    HCT 36.4 09/16/2015 08:29 PM    PLATELET 316 21/30/2087 08:29 PM    MCV 92.9 09/16/2015 08:29 PM     Lab Results   Component Value Date/Time    TSH 1.840 06/02/2016 08:19 AM      Lab Results   Component Value Date/Time    NT pro-BNP 1400 (H) 09/17/2013 10:17 PM      Lab Results   Component Value Date/Time    Sodium 142 03/29/2018 12:00 AM    Potassium 3.9 03/29/2018 12:00 AM    Chloride 103 03/29/2018 12:00 AM    CO2 21 03/29/2018 12:00 AM    Anion gap 9 09/16/2015 08:29 PM    Glucose 81 03/29/2018 12:00 AM    BUN 13 03/29/2018 12:00 AM    Creatinine 0.87 03/29/2018 12:00 AM    BUN/Creatinine ratio 15 03/29/2018 12:00 AM    GFR est  03/29/2018 12:00 AM    GFR est non-AA 89 03/29/2018 12:00 AM    Calcium 8.7 03/29/2018 12:00 AM      Lab Results   Component Value Date/Time    Sodium 142 03/29/2018 12:00 AM    Potassium 3.9 03/29/2018 12:00 AM    Chloride 103 03/29/2018 12:00 AM    CO2 21 03/29/2018 12:00 AM    Anion gap 9 09/16/2015 08:29 PM    Glucose 81 03/29/2018 12:00 AM    BUN 13 03/29/2018 12:00 AM    Creatinine 0.87 03/29/2018 12:00 AM    BUN/Creatinine ratio 15 03/29/2018 12:00 AM    GFR est  03/29/2018 12:00 AM    GFR est non-AA 89 03/29/2018 12:00 AM    Calcium 8.7 03/29/2018 12:00 AM    Bilirubin, total 0.3 09/16/2015 08:29 PM    ALT (SGPT) 18 09/16/2015 08:29 PM    AST (SGOT) 15 09/16/2015 08:29 PM    Alk. phosphatase 57 09/16/2015 08:29 PM    Protein, total 7.9 09/16/2015 08:29 PM    Albumin 3.5 09/16/2015 08:29 PM    Globulin 4.4 (H) 09/16/2015 08:29 PM    A-G Ratio 0.8 (L) 09/16/2015 08:29 PM      Lab Results   Component Value Date/Time    Hemoglobin A1c 5.6 06/02/2016 08:19 AM      I have discussed the diagnosis with  Josiah Morgan and the intended plan as seen in the above orders. Questions were answered concerning future plans, and written instructions provided. I have discussed medication side effects and warnings with the patient as well. Thank you for allowing me to participate in the care of this delightful  [facility] . Please do not hesitate to call with any questions. Deborah Cobb  RN, ACNP-BC, [address] [phone]  24 hour VAD/HF Pager: [phone]

## 2018-06-20 NOTE — MR AVS SNAPSHOT
[address] 
[phone] Patient: Chaz Scanlon MRN: [id number] [id number] Visit Information Date & Time Provider Department Dept. Phone Encounter #  
 6/20/2018  1:00 PM Sid Tejada 3. [phone] [id number] Your Appointments 8/17/2018  8:00 AM  
PROCEDURE with [facility] [address] [facility]) Appt Note: NOT AN OFFICE VISIT - REMOTE BIO ICD  
 [id number] [facility]  
[phone] [address] [facility]  
  
    
 11/6/2018  9:00 AM  
ESTABLISHED PATIENT with Brad Sunshine MD  
[facility]) Appt Note: 6 MO. F/U  
 [address] [facility]  
[phone] [address] Upcoming Health Maintenance Date Due  
 PAP AKA CERVICAL CYTOLOGY 9/24/2007 Influenza Age 5 to Adult 8/1/2018 DTaP/Tdap/Td series (3 - Td) 5/25/2026 Allergies as of 6/20/2018  Review Complete On: 6/20/2018 By: WILMER Tejada Severity Noted Reaction Type Reactions Other Medication  01/24/2013   Side Effect Hives Allergic, to dust mites, molds Pollen Extracts  05/12/2015    Hives Current Immunizations  Reviewed on 5/31/2016 Name Date Influenza Vaccine 10/15/2015,  Deferred (Patient Refused) Influenza Vaccine Intradermal PF 1/16/2017 Pneumococcal Polysaccharide (PPSV-23) 10/15/2015 TDAP Vaccine 8/20/2011 Tdap 5/25/2016  9:21 PM  
  
 Not reviewed this visit You Were Diagnosed With   
  
 Codes Comments Cardiomyopathy, peripartum, postpartum    -  Primary ICD-10-CM: O90.3 ICD-9-CM: 674.54 Essential hypertension     ICD-10-CM: I10 
ICD-9-CM: 401.9 ICD (implantable cardioverter-defibrillator) in place     ICD-10-CM: Z95.810 ICD-9-CM: V45.02 Chronic systolic congestive heart failure (HCC)     ICD-10-CM: I50.22 ICD-9-CM: 428.22, 428.0 Vitamin D deficiency     ICD-10-CM: E55.9 ICD-9-CM: 268.9 Dyspnea, unspecified type     ICD-10-CM: R06.00 
ICD-9-CM: 786.09 Vitals BP Pulse Temp Resp Height(growth percentile) Weight(growth percentile) 114/82 (BP 1 Location: Left arm, BP Patient Position: Sitting) 80 98 °F (36.7 °C) (Oral) 20 5' 4\" (1.626 m) 173 lb 9.6 oz (78.7 kg) SpO2 BMI OB Status Smoking Status 98% 29.8 kg/m2 Having regular periods Never Smoker Vitals History BMI and BSA Data Body Mass Index Body Surface Area  
 29.8 kg/m 2 1.89 m 2 Preferred Pharmacy Pharmacy Name Phone [address] [facility] [phone] Your Updated Medication List  
  
   
This list is accurate as of 6/20/18  1:38 PM.  Always use your most recent med list.  
  
  
  
  
 carvedilol 25 mg tablet Commonly known as:  Deleta Jamee Take 1 Tab by mouth two (2) times daily (with meals). Indications: Chronic Heart Failure  
  
 chlorthalidone 25 mg tablet Commonly known as:  Jen Kelp Take 0.5 Tabs by mouth daily as needed. Indications: Edema COQ10 (UBIQUINOL) PO Take 100 mg by mouth two (2) times a day. fluticasone 50 mcg/actuation nasal spray Commonly known as:  Cyndra Puna 2 Sprays by Both Nostrils route daily. levocetirizine 5 mg tablet Commonly known as:  Burton Binning Take 1 Tab by mouth daily. valsartan 80 mg tablet Commonly known as:  DIOVAN Take 1 Tab by mouth two (2) times a day. Indications: chronic heart failure VITAMIN D3 PO Take  by mouth daily. Prescriptions Sent to Pharmacy  Refills  
 chlorthalidone (HYGROTEN) 25 mg tablet 0  
 Sig: Take 0.5 Tabs by mouth daily as needed. Indications: Edema Class: Normal  
 Pharmacy: [facility] [address] [facility] #: [phone] Route: Oral  
  
We Performed the Following METABOLIC PANEL, BASIC [10676 CPT(R)] NT-PRO BNP [id number] CPT(R)] Patient Instructions Your doing great. . Congratulations on the weight loss Continue current medications Continue to exercise Continue daily weights (in the morning, after passing your urine). Notify HF team of overnight weight gains > 2 lbs or weekly >5 lbs or if any of the following Sx. Continue to limit sodium intake & monitor your fluid intake. Stay hydrated. Call us for any problems,  questions, or concerns. See us in September and then we will stagger visits every 3 months with Dr. Kierra Ann. Introducing [facility] & HEALTH SERVICES! [facility] introduces [employer] patient portal. Now you can access parts of your medical record, email your doctor's office, and request medication refills online. 1. In your internet browser, go to [email or web address] 2. Click on the First Time User? Click Here link in the Sign In box. You will see the New Member Sign Up page. 3. Enter your Retellity Access Code exactly as it appears below. You will not need to use this code after youve completed the sign-up process. If you do not sign up before the expiration date, you must request a new code. · [employer] Access Code: [email or web address] Expires: 7/30/2018 11:03 AM 
 
4. Enter the last four digits of your Social Security Number (xxxx) and Date of Birth (mm/dd/yyyy) as indicated and click Submit. You will be taken to the next sign-up page. 5. Create a MobiTVt ID. This will be your Mobi[employer] login ID and cannot be changed, so think of one that is secure and easy to remember. 6. Create a Mobi[employer]t password. You can change your password at any time. 7. Enter your Password Reset Question and Answer. This can be used at a later time if you forget your password. 8. Enter your e-mail address. You will receive e-mail notification when new information is available in [address] 9. Click Sign Up. You can now view and download portions of your medical record. 10. Click the Download Summary menu link to download a portable copy of your medical information. If you have questions, please visit the Frequently Asked Questions section of the [facility] website. Remember, [facility] is NOT to be used for urgent needs. For medical emergencies, dial 911. Now available from your iPhone and Android! Please provide this summary of care documentation to your next provider. Your primary care clinician is listed as NONE. If you have any questions after today's visit, please call [phone].

## 2018-06-20 NOTE — PROGRESS NOTES
[facility] Clinic Note      DOS:   6/20/2018  NAME:  Chaz Scanlon    MRN:   [id number]   REFERRING PROVIDER:  Juanito Rucker MD  PRIMARY CARE PHYSICIAN: None  PRIMARY CARDIOLOGIST: Juanito Rucker MD  EP: Dr. Todd Epps           Chief Complaint:   Chief Complaint   Patient presents with    Follow-up           HPI: 33y.o. year old female with a history of HFrEF in the setting of NICM with PPCM and hx HTN who presents for follow up of her chronic systolic heart failure. She was last seen 3 months ago at which time her valsartan was increased. proBNP was 80. Since her last visit she has seen Dr. Brynn Siddiqui and undergone echo that showed normal EF. She is feeling good and has lost ~ 10 # since last year. She is watching her calories, sugar, and exercising regularly (briskly walking 30-40 minutes 3-5 times per week). She denies MOULTON, notes some fatigue during the day. She had a (-) sleep study several years ago. She has been compliant with her meds, eats out almost daily, does not add salt. Uses chlorthalidone 12.5 mg ~ once per week for weekly for fluid retention and weight gain      She denies exertional chest pain,  PND, orthopnea, edema, palpitations, presyncope, and syncope, AICD shocks. She admitted to a decline in her LVEF when she was non compliant with her medications. She remains compliant with meds, daily wts, and is not sexually active. She was taking OCA which she stopped in April.      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 HFrEF, improved EF to 55-60%- Stage C, NYHA Class II, d/t PPCM   Continue Valsartan to 80 mg BID, Coreg 25 mg BID   Prn low dose chlorthalidone for weight gain   Counseled patient regarding birth control and risks of worsening HF and death with recurrent pregnancy   She is not currently sexually active- previously used OCA   Counseled patient regarding the risk of birth defects with taking ARB   We will see her in September and then stagger biannual visits with Dr. Tyrese Burnett so that she is seen every quarter by one of us         2. Risk of SCD - s/p AICD   No shocks   Last check 2018 brief SVT - asx    3. SVT- asx found on AICD    Ck labs   Cont BB   Followed by EP     4. HTN- BP controlled on current therapy       Reveiw of Systems:    General:  Denies fever or chills   HEENT: negative  Pulmonary: no cough, shortness of breath, or wheezing  Cardiac: Per HPI  GI:  no abdominal pain, change in bowel habits, or black or bloody stools  Musculo: negative  Neuro: no TIA or stroke symptoms  Skin:  negative  Allergies: REMINDER:DOCUMENT ALLERGIES IN ALLERGY ACTIVITY  Psych: negative    CARDIAC EVALUATION   ECHO 2013: EF 50%, mild MR/TR   ECHO 2015: EF 30-35%  ECHO 2015:LVD, EF 25-30%, [address]  ECHO 2016: EF 50%  ECHO 2017: EF 40-45%, mild DHK, LVH, RV mild dilated   ECHO 2018: EF 50%, no WMA, wall thickness < normal (.8-.9 cm), mild MR/TR,   ECHO 18: LVD, EF 55-60%, mild mitral annular dilation, trivial MR/TR       CARDIAC MRI 2016: LVEF 50%, mild GHK, RVEF 60%      AICD : Biotronik      EK/30/17: Sinus  Rhythm; -  Nonspecific T-abnormality.    18; NSR, NSST changes, QTc 439 ms, QRS 90 ms         History:  Past Medical History:   Diagnosis Date    AICD (automatic cardioverter/defibrillator) present     Anemia NEC     Cardiomyopathy, peripartum, postpartum 2/10/2015    ECHO 2013: EF 50%, mild MR/TR  ECHO 2015: EF 30-35% ECHO 2015:LVD, EF 25-30%, [address] ECHO 2016: EF 50% ECHO 2017: EF 40-45%, mild DHK, LVH, RV mild dilated ECHO 2018: EF 50%, no WMA, wall thickness < no (.8-.9 cm), mil MR/TR,   CARDIAC MRI 2016: LVEF 50%, mild GHK, RVEF 60%   AICD 2015: Biotronik     Contact dermatitis and other eczema, due to unspecified cause     hives    Hypertension     Systolic congestive heart failure with reduced left ventricular function, NYHA class 2 ([facility] [address] 3/29/2018     Past Surgical History:   Procedure Laterality Date    HX GYN  13        HX IMPLANTABLE CARDIOVERTER DEFIBRILLATOR  2015    [employer] #[id number]     Social History     Social History    Marital status:      Spouse name: N/A    Number of children: 0    Years of education: N/A     Occupational History    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      Unknown     Social History Main Topics    Smoking status: Never Smoker    Smokeless tobacco: Never Used    Alcohol use No    Drug use: No      Comment: denies     Sexual activity: Yes     Partners: Male     Birth control/ protection: Pill     Other Topics Concern    Not on file     Social History Narrative    ** Merged History Encounter **          Family History   Problem Relation Age of Onset    Hypertension Mother     Hypertension Father     Diabetes Maternal Grandmother     Heart Disease Maternal Grandmother     Arthritis-osteo Maternal Grandfather     Asthma Brother     Arthritis-osteo Paternal Grandmother     Arthritis-osteo Paternal Grandfather        Current Medications:   Current Outpatient Prescriptions   Medication Sig Dispense Refill    chlorthalidone (HYGROTEN) 25 mg tablet Take 12.5 mg by mouth daily as needed.  valsartan (DIOVAN) 80 mg tablet Take 1 Tab by mouth two (2) times a day. Indications: chronic heart failure 60 Tab 3    carvedilol (COREG) 25 mg tablet Take 1 Tab by mouth two (2) times daily (with meals). Indications: Chronic Heart Failure 60 Tab 6    COQ10, UBIQUINOL, PO Take 100 mg by mouth two (2) times a day.  CHOLECALCIFEROL, VITAMIN D3, (VITAMIN D3 PO) Take  by mouth daily.  levocetirizine (XYZAL) 5 mg tablet Take 1 Tab by mouth daily. 30 Tab 5    fluticasone (FLONASE) 50 mcg/actuation nasal spray 2 Sprays by Both Nostrils route daily. 1 Bottle 5       Allergies:    Allergies   Allergen Reactions    Other Medication Hives     Allergic, to dust mites, molds  Pollen Extracts Hives         Admission Weight: Last Weight   Weight: 173 lb 9.6 oz (78.7 kg) Weight: 173 lb 9.6 oz (78.7 kg)       Physical Exam:   Vitals:    Visit Vitals    /82 (BP 1 Location: Left arm, BP Patient Position: Sitting)    Pulse 80    Temp 98 °F (36.7 °C) (Oral)    Resp 20    Ht 5' 4\" (1.626 m)    Wt 173 lb 9.6 oz (78.7 kg)    SpO2 98%    BMI 29.8 kg/m2       General:  Well developed AA female, well nourished,  alert, cooperative, in NAD  HEENT: Normocephalic, atraumatic, PERRLA,  and Sclera clear, anicteric  Neck:  supple, no significant adenopathy, carotids upstroke normal bilaterally, no bruits  CVP:  8 cm  ( - ) HJR  Cardiac: PMI diminished, AICD left infraclavicular space, RRR,  S1, S2 normal, no murmur, click, rub or gallop  Chest: breath sounds clear and equal bilaterally  Abdomen: soft, non-tender. Bowel sounds normal. No masses, no organomegaly.   Extremity: extremities normal, atraumatic, no cyanosis or edema  Neuro: A&O x 3 no focal deficits   Skin:   no rashes    Recent Labs:   Lab Results   Component Value Date/Time    WBC 9.5 09/16/2015 08:29 PM    HGB 12.2 09/16/2015 08:29 PM    HCT 36.4 09/16/2015 08:29 PM    PLATELET 697 42/22/7285 08:29 PM    MCV 92.9 09/16/2015 08:29 PM     Lab Results   Component Value Date/Time    TSH 1.840 06/02/2016 08:19 AM      Lab Results   Component Value Date/Time    NT pro-BNP 1400 (H) 09/17/2013 10:17 PM      Lab Results   Component Value Date/Time    Sodium 142 03/29/2018 12:00 AM    Potassium 3.9 03/29/2018 12:00 AM    Chloride 103 03/29/2018 12:00 AM    CO2 21 03/29/2018 12:00 AM    Anion gap 9 09/16/2015 08:29 PM    Glucose 81 03/29/2018 12:00 AM    BUN 13 03/29/2018 12:00 AM    Creatinine 0.87 03/29/2018 12:00 AM    BUN/Creatinine ratio 15 03/29/2018 12:00 AM    GFR est  03/29/2018 12:00 AM    GFR est non-AA 89 03/29/2018 12:00 AM    Calcium 8.7 03/29/2018 12:00 AM      Lab Results   Component Value Date/Time    Sodium 142 03/29/2018 12:00 AM    Potassium 3.9 03/29/2018 12:00 AM    Chloride 103 03/29/2018 12:00 AM    CO2 21 03/29/2018 12:00 AM    Anion gap 9 09/16/2015 08:29 PM    Glucose 81 03/29/2018 12:00 AM    BUN 13 03/29/2018 12:00 AM    Creatinine 0.87 03/29/2018 12:00 AM    BUN/Creatinine ratio 15 03/29/2018 12:00 AM    GFR est  03/29/2018 12:00 AM    GFR est non-AA 89 03/29/2018 12:00 AM    Calcium 8.7 03/29/2018 12:00 AM    Bilirubin, total 0.3 09/16/2015 08:29 PM    ALT (SGPT) 18 09/16/2015 08:29 PM    AST (SGOT) 15 09/16/2015 08:29 PM    Alk. phosphatase 57 09/16/2015 08:29 PM    Protein, total 7.9 09/16/2015 08:29 PM    Albumin 3.5 09/16/2015 08:29 PM    Globulin 4.4 (H) 09/16/2015 08:29 PM    A-G Ratio 0.8 (L) 09/16/2015 08:29 PM      Lab Results   Component Value Date/Time    Hemoglobin A1c 5.6 06/02/2016 08:19 AM      I have discussed the diagnosis with  Gigi Juárez and the intended plan as seen in the above orders. Questions were answered concerning future plans, and written instructions provided. I have discussed medication side effects and warnings with the patient as well. Thank you for allowing me to participate in the care of this delightful  [facility] . Please do not hesitate to call with any questions. Deborah Crump  RN, ACNP-BC, [address] [phone]  24 hour VAD/HF Pager: [phone]

## 2018-06-21 ENCOUNTER — TELEPHONE (OUTPATIENT)
Dept: CARDIOLOGY CLINIC | Age: 33
End: 2018-06-21

## 2018-06-21 LAB
BUN SERPL-MCNC: 10 MG/DL (ref 6–20)
BUN/CREAT SERPL: 11 (ref 9–23)
CALCIUM SERPL-MCNC: 9.3 MG/DL (ref 8.7–10.2)
CHLORIDE SERPL-SCNC: 101 MMOL/L (ref 96–106)
CO2 SERPL-SCNC: 23 MMOL/L (ref 20–29)
CREAT SERPL-MCNC: 0.88 MG/DL (ref 0.57–1)
GFR SERPLBLD CREATININE-BSD FMLA CKD-EPI: 101 ML/MIN/1.73
GFR SERPLBLD CREATININE-BSD FMLA CKD-EPI: 88 ML/MIN/1.73
GLUCOSE SERPL-MCNC: 81 MG/DL (ref 65–99)
NT-PROBNP SERPL-MCNC: 73 PG/ML (ref 0–130)
POTASSIUM SERPL-SCNC: 4.2 MMOL/L (ref 3.5–5.2)
SODIUM SERPL-SCNC: 140 MMOL/L (ref 134–144)

## 2018-06-21 NOTE — TELEPHONE ENCOUNTER
----- Message from WILMER Beasley sent at 6/21/2018  8:55 AM EDT -----  Labs stable  Continue Current plan  Thank you    I called patient and reviewed lab results as noted, she states understanding and has no further questions.

## 2018-07-17 DIAGNOSIS — I50.22 SYSTOLIC CHF, CHRONIC (HCC): ICD-10-CM

## 2018-07-17 DIAGNOSIS — Z95.810 ICD (IMPLANTABLE CARDIOVERTER-DEFIBRILLATOR) IN PLACE: ICD-10-CM

## 2018-07-17 DIAGNOSIS — I10 ESSENTIAL HYPERTENSION: ICD-10-CM

## 2018-07-17 DIAGNOSIS — R06.83 SNORING: ICD-10-CM

## 2018-07-17 RX ORDER — VALSARTAN 80 MG/1
80 TABLET ORAL 2 TIMES DAILY
Qty: 60 TAB | Refills: 3 | Status: SHIPPED | OUTPATIENT
Start: 2018-07-17 | End: 2018-11-14 | Stop reason: SDUPTHER

## 2018-08-17 ENCOUNTER — CLINICAL SUPPORT (OUTPATIENT)
Dept: CARDIOLOGY CLINIC | Age: 33
End: 2018-08-17

## 2018-08-17 DIAGNOSIS — Z95.810 PRESENCE OF AUTOMATIC CARDIOVERTER/DEFIBRILLATOR (AICD): Primary | ICD-10-CM

## 2018-08-17 DIAGNOSIS — I50.22 SYSTOLIC CHF, CHRONIC (HCC): ICD-10-CM

## 2018-08-20 ENCOUNTER — OFFICE VISIT (OUTPATIENT)
Dept: INTERNAL MEDICINE CLINIC | Facility: CLINIC | Age: 33
End: 2018-08-20

## 2018-08-20 VITALS
HEART RATE: 87 BPM | HEIGHT: 64 IN | TEMPERATURE: 98.9 F | BODY MASS INDEX: 30.56 KG/M2 | DIASTOLIC BLOOD PRESSURE: 70 MMHG | RESPIRATION RATE: 18 BRPM | WEIGHT: 179 LBS | SYSTOLIC BLOOD PRESSURE: 104 MMHG

## 2018-08-20 DIAGNOSIS — I50.20 SYSTOLIC CONGESTIVE HEART FAILURE WITH REDUCED LEFT VENTRICULAR FUNCTION, NYHA CLASS 2 (HCC): Primary | ICD-10-CM

## 2018-08-20 DIAGNOSIS — E55.9 VITAMIN D DEFICIENCY: ICD-10-CM

## 2018-08-20 DIAGNOSIS — Z95.810 ICD (IMPLANTABLE CARDIOVERTER-DEFIBRILLATOR) IN PLACE: ICD-10-CM

## 2018-08-20 DIAGNOSIS — L30.9 ECZEMA, UNSPECIFIED TYPE: ICD-10-CM

## 2018-08-20 NOTE — PROGRESS NOTES
Subjective:      Cassidy Quiroga is a 33 y.o. female who presents today for establishment      Diagnosed with postpartum CM 2013- sees cardio regularly  htn well controlled  Systolic CHF  ICD implanted in 2015  History of eczema  Vit D def takes otc supplement      Recently evaluated by cardio Dr. Madiha Vasques  EF 55-60%  Valsartan 80 bid  Coreg 25 bid  Prn chlorthalidone  Watches weight and is on a low salt diet  She will be seeing cardio every 3 months    Prior pcp was Dr. Yoanna Burnett, just had physical in May  Her OBGYN is Dr. Shahriar Black      Patient Active Problem List    Diagnosis Date Noted    Systolic CHF, chronic ([facility] [address] 39/69/8813    Systolic congestive heart failure with reduced left ventricular function, NYHA class 2 ([facility] [address] 03/29/2018    HTN (hypertension) 05/12/2015    ICD (implantable cardioverter-defibrillator) in place 05/12/2015    Cardiomyopathy, peripartum, postpartum 02/10/2015    Vitamin D deficiency 11/29/2011    Snoring 11/23/2011     Current Outpatient Prescriptions   Medication Sig Dispense Refill    valsartan (DIOVAN) 80 mg tablet Take 1 Tab by mouth two (2) times a day. Indications: chronic heart failure 60 Tab 3    carvedilol (COREG) 25 mg tablet TAKE 1 TABLET BY MOUTH TWICE DAILY WITH MEALS 60 Tab 6    chlorthalidone (HYGROTEN) 25 mg tablet Take 0.5 Tabs by mouth daily as needed. Indications: Edema 30 Tab 0    COQ10, UBIQUINOL, PO Take 100 mg by mouth two (2) times a day.  CHOLECALCIFEROL, VITAMIN D3, (VITAMIN D3 PO) Take  by mouth daily.  levocetirizine (XYZAL) 5 mg tablet Take 1 Tab by mouth daily. 30 Tab 5    fluticasone (FLONASE) 50 mcg/actuation nasal spray 2 Sprays by Both Nostrils route daily.  1 Bottle 5     Allergies   Allergen Reactions    Other Medication Hives     Allergic, to dust mites, molds    Pollen Extracts Hives     Past Medical History:   Diagnosis Date    AICD (automatic cardioverter/defibrillator) present     Anemia NEC     Cardiomyopathy, peripartum, postpartum 2/10/2015    ECHO 2013: EF 50%, mild MR/TR  ECHO 2015: EF 30-35% ECHO 2015:LVD, EF 25-30%, [address] ECHO 2016: EF 50% ECHO 2017: EF 40-45%, mild DHK, LVH, RV mild dilated ECHO 2018: EF 50%, no WMA, wall thickness < no (.8-.9 cm), mil MR/TR,   CARDIAC MRI 2016: LVEF 50%, mild GHK, RVEF 60%   AICD : Biotronik     Contact dermatitis and other eczema, due to unspecified cause     hives    Hypertension     Systolic congestive heart failure with reduced left ventricular function, NYHA class 2 ([address] 3/29/2018     Past Surgical History:   Procedure Laterality Date    HX GYN  13        HX IMPLANTABLE CARDIOVERTER DEFIBRILLATOR  2015    [employer] #[id number]     Family History   Problem Relation Age of Onset    Hypertension Mother     Hypertension Father     Diabetes Maternal Grandmother     Heart Disease Maternal Grandmother     Arthritis-osteo Maternal Grandfather     Asthma Brother     Arthritis-osteo Paternal Grandmother     Arthritis-osteo Paternal Grandfather      Social History   Substance Use Topics    Smoking status: Never Smoker    Smokeless tobacco: Never Used    Alcohol use No      Works at [employer], 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 Objective: There were no vitals taken for this visit. General:  Alert, cooperative, no distress, appears stated age. Head:  Normocephalic, without obvious abnormality, atraumatic. Eyes:  Conjunctivae/corneas clear. PERRL, EOMs intact. Fundi benign. Ears:  Normal TMs and external ear canals both ears. Nose: Nares normal. Septum midline. Mucosa normal. No drainage or sinus tenderness.    Throat: Lips, mucosa, and tongue normal. Teeth and gums normal.   Neck: Supple, symmetrical, trachea midline, no adenopathy, thyroid: no enlargement/tenderness/nodules, no carotid bruit and no JVD. Back:   Symmetric, no curvature. ROM normal. No CVA tenderness. Lungs:   Clear to auscultation bilaterally. Chest wall:  No tenderness or deformity. Heart:  Regular rate and rhythm, S1, S2 normal, no murmur, click, rub or gallop. Abdomen:   Soft, non-tender. Bowel sounds normal. No masses,  No organomegaly. Extremities: Extremities normal, atraumatic, no cyanosis or edema. Pulses: 2+ and symmetric all extremities. Assessment/Plan:       ICD-10-CM ICD-9-CM    1. Systolic congestive heart failure with reduced left ventricular function, NYHA class 2 ([facility]) I50.20 428.20      428.0    2. Cardiomyopathy, peripartum, postpartum O90.3 674.54           3 Vitamin D deficiency E55.9 268.9    4 ICD (implantable cardioverter-defibrillator) in place Z95.810 V45.02    5 Eczema, unspecified type L30.9 692.9        Follow-up Disposition: Not on File   Advised her to call back or return to office if symptoms worsen/change/persist.  Discussed expected course/resolution/complications of diagnosis in detail with patient. Medication risks/benefits/costs/interactions/alternatives discussed with patient. She was given an after visit summary which includes diagnoses, current medications, & vitals. She expressed understanding with the diagnosis and plan.

## 2018-08-20 NOTE — PROGRESS NOTES
Chief Complaint   Patient presents with   Nolia Stamp Establish Care     1. Have you been to the ER, urgent care clinic since your last visit? Hospitalized since your last visit? No    2. Have you seen or consulted any other health care providers outside of the [address] [place] [address] since your last visit? Include any pap smears or colon screening.  No

## 2018-09-20 ENCOUNTER — OFFICE VISIT (OUTPATIENT)
Dept: CARDIOLOGY CLINIC | Age: 33
End: 2018-09-20

## 2018-09-20 VITALS
DIASTOLIC BLOOD PRESSURE: 80 MMHG | HEIGHT: 64 IN | RESPIRATION RATE: 12 BRPM | HEART RATE: 76 BPM | TEMPERATURE: 98 F | SYSTOLIC BLOOD PRESSURE: 130 MMHG | OXYGEN SATURATION: 99 % | BODY MASS INDEX: 30.56 KG/M2 | WEIGHT: 179 LBS

## 2018-09-20 DIAGNOSIS — I10 ESSENTIAL HYPERTENSION: ICD-10-CM

## 2018-09-20 DIAGNOSIS — I50.20 SYSTOLIC CONGESTIVE HEART FAILURE WITH REDUCED LEFT VENTRICULAR FUNCTION, NYHA CLASS 2 (HCC): ICD-10-CM

## 2018-09-20 DIAGNOSIS — R53.82 CHRONIC FATIGUE: ICD-10-CM

## 2018-09-20 DIAGNOSIS — R06.02 SOB (SHORTNESS OF BREATH): ICD-10-CM

## 2018-09-20 DIAGNOSIS — E66.09 CLASS 1 OBESITY DUE TO EXCESS CALORIES WITHOUT SERIOUS COMORBIDITY WITH BODY MASS INDEX (BMI) OF 30.0 TO 30.9 IN ADULT: ICD-10-CM

## 2018-09-20 DIAGNOSIS — E55.9 VITAMIN D DEFICIENCY: ICD-10-CM

## 2018-09-20 DIAGNOSIS — I50.22 SYSTOLIC CHF, CHRONIC (HCC): ICD-10-CM

## 2018-09-20 PROBLEM — R53.83 OTHER FATIGUE: Status: ACTIVE | Noted: 2018-09-20

## 2018-09-20 NOTE — LETTER
9/20/2018 4:22 PM 
 
Patient:  Concetta Irby YOB: 1985 Date of Visit: 9/20/2018 Dear Karine Sánchez MD 
[facility] [address] [facility] [address] [id number] VIA In Basket Elaine Syed MD 
[address] [phone] VIA In Basket 
 : Thank you for referring Ms. Karson Stubbs to me for evaluation/treatment. Below are the relevant portions of my assessment and plan of care. If you have questions, please do not hesitate to call me. I look forward to following Ms. Silvio Pozo along with you. Sincerely, WILMER Bailon

## 2018-09-20 NOTE — PATIENT INSTRUCTIONS
You are doing well from a heart standpoint   CONTINUE CURRENT medications    Work on losing weight- referral to dietician. Track calories recommend  Limit WHITE foods ( flour, sugar, pasta, rice, potatoes)-watch portions  start with 1400/day, stay hydrated with 8 glasses of water daily  Increase activity with 10, 000 steps. Labs today BMP, proBNP, thyroid      See Dr Holland Fees a positive attitude       HF Education: Continue daily weights (in the morning, after voiding). Notify HF team of overnight weight gains > 2 lbs or weekly >5 lbs or if any of the following Sx. Continue to limit sodium intake & monitor your fluid intake .

## 2018-09-20 NOTE — MR AVS SNAPSHOT
[address] [facility], [address] [place] [address] 
[phone] Patient: Achilles Branch MRN: [id number] CXR:2/93/4750 Visit Information Date & Time Provider Department Dept. Phone Encounter #  
 9/20/2018  1:15 PM Hilma Lesches, Árpád Rosemaryjoyce Martin 3. [phone] [id number] Follow-up Instructions Return if symptoms worsen or fail to improve. Your Appointments 11/6/2018  9:00 AM  
ESTABLISHED PATIENT with Robinson Fernandez MD  
[facility]) Appt Note: 6 MO. F/U  
 [address] [facility]  
[phone] [address]  
  
    
 11/16/2018  8:00 AM  
PROCEDURE with [facility]) Appt Note: not an office visit, bio icd (send letter for annual) [id number] [facility]  
[phone] [address] [facility]  
  
    
 11/21/2018  3:45 PM  
ROUTINE CARE with Jessica Valera MD  
[facility]) Appt Note: follow up rmw 8.20.18 $0CP  
 [facility] Upcoming Health Maintenance Date Due  
 PAP AKA CERVICAL CYTOLOGY 9/24/2007 Influenza Age 5 to Adult 8/1/2018 DTaP/Tdap/Td series (3 - Td) 5/25/2026 Allergies as of 9/20/2018  Review Complete On: 9/20/2018 By: Hilma Lesches, ACNP Severity Noted Reaction Type Reactions Other Medication  01/24/2013   Side Effect Hives Allergic, to dust mites, molds Pollen Extracts  05/12/2015    Hives Current Immunizations  Reviewed on 5/31/2016 Name Date Influenza Vaccine 10/15/2015,  Deferred (Patient Refused) Influenza Vaccine Intradermal PF 1/16/2017 Pneumococcal Polysaccharide (PPSV-23) 10/15/2015 TDAP Vaccine 8/20/2011 Tdap 5/25/2016  9:21 PM  
  
 Not reviewed this visit You Were Diagnosed With   
  
 Codes Comments Cardiomyopathy, peripartum, postpartum    -  Primary ICD-10-CM: O90.3 ICD-9-CM: 674.54 Systolic congestive heart failure with reduced left ventricular function, NYHA class 2 (HCC)     ICD-10-CM: I50.20 ICD-9-CM: 428.20, 428.0 Systolic CHF, chronic (HCC)     ICD-10-CM: I50.22 ICD-9-CM: 428.22, 428.0 Essential hypertension     ICD-10-CM: I10 
ICD-9-CM: 401.9 SOB (shortness of breath)     ICD-10-CM: R06.02 
ICD-9-CM: 786.05 Vitamin D deficiency     ICD-10-CM: E55.9 ICD-9-CM: 268.9 Class 1 obesity due to excess calories without serious comorbidity with body mass index (BMI) of 30.0 to 30.9 in adult     ICD-10-CM: E66.09, Z68.30 ICD-9-CM: 278.00, V85.30 Chronic fatigue     ICD-10-CM: R53.82 
ICD-9-CM: 780.79 Vitals BP Pulse Temp Resp Height(growth percentile) Weight(growth percentile) 130/80 76 98 °F (36.7 °C) 12 5' 4.02\" (1.626 m) 179 lb (81.2 kg) SpO2 BMI OB Status Smoking Status 99% 30.71 kg/m2 Having regular periods Never Smoker Vitals History BMI and BSA Data Body Mass Index Body Surface Area 30.71 kg/m 2 1.92 m 2 Preferred Pharmacy Pharmacy Name Phone [id number]- [address] Your Updated Medication List  
  
   
This list is accurate as of 9/20/18  2:15 PM.  Always use your most recent med list.  
  
  
  
  
 carvedilol 25 mg tablet Commonly known as:  COREG  
TAKE 1 TABLET BY MOUTH TWICE DAILY WITH MEALS  
  
 chlorthalidone 25 mg tablet Commonly known as:  Aarti Mcintoshh Take 0.5 Tabs by mouth daily as needed. Indications: Edema COQ10 (UBIQUINOL) PO Take 100 mg by mouth two (2) times a day. fluticasone 50 mcg/actuation nasal spray Commonly known as:  Mar Aparicio 2 Sprays by Both Nostrils route daily. levocetirizine 5 mg tablet Commonly known as:  Vangie Manisha Take 1 Tab by mouth daily. valsartan 80 mg tablet Commonly known as:  DIOVAN Take 1 Tab by mouth two (2) times a day. Indications: chronic heart failure VITAMIN D3 PO Take  by mouth daily. We Performed the Following METABOLIC PANEL, BASIC [56295 CPT(R)] NT-PRO BNP [id number] CPT(R)] REFERRAL TO DIETITIAN [BBA13 Custom] TSH AND FREE T4 [80089 CPT(R)] Follow-up Instructions Return if symptoms worsen or fail to improve. Referral Information Referral ID Referred By Referred To  
  
 [id number] EZEKIEL DICKINSON Not Available Visits Status Start Date End Date 1 New Request 9/20/18 9/20/19 If your referral has a status of pending review or denied, additional information will be sent to support the outcome of this decision. Patient Instructions You are doing well from a heart standpoint CONTINUE CURRENT medications Work on losing [employer]- referral to dietician. Track calories recommend Limit WHITE foods ( flour, sugar, pasta, rice, potatoes)-watch portions  start with 1400/day, stay hydrated with 8 glasses of water daily Increase activity with 10, 000 steps. Labs today BMP, proBNP, thyroid See Dr Patrick Syed Keep a positive attitude HF Education: Continue daily weights (in the morning, after voiding). Notify HF team of overnight weight gains > 2 lbs or weekly >5 lbs or if any of the following Sx. Continue to limit sodium intake & monitor your fluid intake . Introducing [facility]! Sandi Yates introduces [employer] patient portal. Now you can access parts of your medical record, email your doctor's office, and request medication refills online. 1. In your internet browser, go to [email or web address] 2. Click on the First Time User? Click Here link in the Sign In box.  You will see the New Member Sign Up page. 3. Enter your [employer] Access Code exactly as it appears below. You will not need to use this code after youve completed the sign-up process. If you do not sign up before the expiration date, you must request a new code. · [employer] Access Code: [email or web address] Expires: 11/15/2018  4:19 PM 
 
4. Enter the last four digits of your Social Security Number (xxxx) and Date of Birth (mm/dd/yyyy) as indicated and click Submit. You will be taken to the next sign-up page. 5. Create a [employer] ID. This will be your [employer] login ID and cannot be changed, so think of one that is secure and easy to remember. 6. Create a [employer] password. You can change your password at any time. 7. Enter your Password Reset Question and Answer. This can be used at a later time if you forget your password. 8. Enter your e-mail address. You will receive e-mail notification when new information is available in [address]. 9. Click Sign Up. You can now view and download portions of your medical record. 10. Click the Download Summary menu link to download a portable copy of your medical information. If you have questions, please visit the Frequently Asked Questions section of the [employer] website. Remember, [employer] is NOT to be used for urgent needs. For medical emergencies, dial 911. Now available from your iPhone and Android! Please provide this summary of care documentation to your next provider. Your primary care clinician is listed as Lydia Nava. If you have any questions after today's visit, please call [phone].

## 2018-09-20 NOTE — PROGRESS NOTES
[facility] Clinic Note      DOS:   9/20/2018  NAME:  Tyler Bustillo    MRN:   [id number]   REFERRING PROVIDER:  Ivania Lorenzana MD  PRIMARY CARE PHYSICIAN: Dr. Ryan Nguyen: Ivania Lorenzana MD  EP: Dr. Eugene Lerma       Impression/Plan:   HFrEF, improved EF to 55-60%- Stage C, NYHA Class II, d/t PPCM   Continue Valsartan to 80 mg BID, Coreg 25 mg BID   Prn low dose chlorthalidone for weight gain   Counseled patient regarding birth control and risks of worsening HF and death with recurrent pregnancy   She is not currently sexually active- previously used OCA   Counseled patient regarding the risk of birth defects with taking ARB   We will see her in September and then stagger biannual visits with Dr. Pam Hamlin so that she is seen every quarter by one of us       Risk of SCD - s/p AICD   No shocks   Last check 5/2018 brief SVT - asx    SVT- asx found on AICD    Ck labs   Cont BB   Followed by EP     HTN- BP controlled on current therapy     Fatigue      Ck TSH     Obesity      nutritional eval       Encouraged to count calories and limit to 1400/day, limit \"white\" foods, exercise daily - increase steps to 10K      45 minutes face to face time > 35 % spent counseling on diet and nutrition     Thank you for allowing me to participate in the care of this delightful  lady. Please do not hesitate to call with any questions. Deborah Soni  RN, ACNP-BC, [facility]             Chief Complaint:   Chief Complaint   Patient presents with    Follow Up Chronic Condition           Viola Lugo is a delightful  33y.o. year old female with a history of HFrEF ( EF 30-35%)  in the setting of NICM with PPCM and hx HTN who presents for follow up of her chronic systolic heart failure. She was last seen 3 months ago. Her most recent Echo 6/2018: shows normalization of EF 55-60%.       Last visit changes:    Recent ProBNP    Cr   K   Since last visit seen by PCP   To  establish practice. Heart Failure Symptom Review     Breathing:  Denies exertional dyspnea. ADLs: Able to perform ADLs    Sleep: 5-6 hours per night, then wakes up once per night. Denies  Orthopnea, PND, Nocturia.  + snores, does not sleep well. Naps on the weekends. Fatigue: most of the time, 1 cup caffeine daily    Weight: follows daily- she has gained a few pounds, she attributes to not being as active     Fluid: no hygroten since her last visit. Denies  abdominal bloating, LE edema   Appetite/Diet:     Appetite:  Good, Denies  early satiety, nausea, restricts NA  Fluid restriction     Diet:  Eats prepared foods. Drinks about 16 oz of water daily. Activity :    Activity:  Generally sedentary during the day. She dances 3 times per week. Home BP:   range      Compliant with meds      Denies exertional chest pain, palpitations, lightheadedness, dizziness, syncope, near syncope, AICD shock, bleeding. AICD check 9 episodes of SVT            She is feeling good and has lost ~ 10 # since last year. She is watching her calories, sugar, and exercising regularly (briskly walking 30-40 minutes 3-5 times per week). She denies MOULTON, notes some fatigue during the day. She had a (-) sleep study several years ago. She has been compliant with her meds, eats out almost daily, does not add salt. Uses chlorthalidone 12.5 mg ~ once per week for weekly for fluid retention and weight gain      She denies exertional chest pain,  PND, orthopnea, edema, palpitations, presyncope, and syncope, AICD shocks. She admitted to a decline in her LVEF when she was non compliant with her medications. She remains compliant with meds, daily wts, and is not sexually active. She was taking OCA which she stopped in April.      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  Rare etoh, never smoked        Reveiw of Systems:    General:  Denies fever or chills   HEENT: Negative Denies angioedema Pulmonary: no cough, shortness of breath, or wheezing  Cardiac: Per HPI  GI:  no abdominal pain, change in bowel habits, or black or bloody stools  Musculo: negative  Neuro: no TIA or stroke symptoms  Skin:  negative  Allergies: REMINDER:DOCUMENT ALLERGIES IN ALLERGY ACTIVITY  Psych: negative    CARDIAC EVALUATION   ECHO 2013: EF 50%, mild MR/TR   ECHO 2015: EF 30-35%  ECHO 2015:LVD, EF 25-30%, [address]  ECHO 2016: EF 50%  ECHO 2017: EF 40-45%, mild DHK, LVH, RV mild dilated   ECHO 2018: EF 50%, no WMA, wall thickness < normal (.8-.9 cm), mild MR/TR,   ECHO 18: LVD, EF 55-60%, mild mitral annular dilation, trivial MR/TR       CARDIAC MRI 2016: LVEF 50%, mild GHK, RVEF 60%      AICD : Biotronik      EK/30/17: Sinus  Rhythm; -  Nonspecific T-abnormality.    18; NSR, NSST changes, QTc 439 ms, QRS 90 ms         History:  Past Medical History:   Diagnosis Date    AICD (automatic cardioverter/defibrillator) present     Anemia NEC     Cardiomyopathy, peripartum, postpartum 2/10/2015    ECHO 2013: EF 50%, mild MR/TR  ECHO 2015: EF 30-35% ECHO 2015:LVD, EF 25-30%, [address] ECHO 2016: EF 50% ECHO 2017: EF 40-45%, mild DHK, LVH, RV mild dilated ECHO 2018: EF 50%, no WMA, wall thickness < no (.8-.9 cm), mil MR/TR,   CARDIAC MRI 2016: LVEF 50%, mild GHK, RVEF 60%   AICD : Biotronik     Contact dermatitis and other eczema, due to unspecified cause     hives    Hypertension     Systolic congestive heart failure with reduced left ventricular function, NYHA class 2 ([address] 3/29/2018     Past Surgical History:   Procedure Laterality Date    HX GYN  13        HX IMPLANTABLE CARDIOVERTER DEFIBRILLATOR  2015    [employer] #[id number]     Social History     Social History    Marital status:      Spouse name: N/A    Number of children: 0    Years of education: N/A     Occupational History    jail analyist      Unknown     Social History Main Topics    Smoking status: Never Smoker    Smokeless tobacco: Never Used    Alcohol use No    Drug use: No      Comment: denies     Sexual activity: Yes     Partners: Male     Birth control/ protection: Pill     Other Topics Concern    Not on file     Social History Narrative    ** Merged History Encounter **          Family History   Problem Relation Age of Onset    Hypertension Mother     Hypertension Father     Diabetes Maternal Grandmother     Heart Disease Maternal Grandmother     Arthritis-osteo Maternal Grandfather     Asthma Brother     Arthritis-osteo Paternal Grandmother     Arthritis-osteo Paternal Grandfather        Current Medications:   Current Outpatient Prescriptions   Medication Sig Dispense Refill    valsartan (DIOVAN) 80 mg tablet Take 1 Tab by mouth two (2) times a day. Indications: chronic heart failure 60 Tab 3    carvedilol (COREG) 25 mg tablet TAKE 1 TABLET BY MOUTH TWICE DAILY WITH MEALS 60 Tab 6    chlorthalidone (HYGROTEN) 25 mg tablet Take 0.5 Tabs by mouth daily as needed. Indications: Edema 30 Tab 0    COQ10, UBIQUINOL, PO Take 100 mg by mouth two (2) times a day.  CHOLECALCIFEROL, VITAMIN D3, (VITAMIN D3 PO) Take  by mouth daily.  levocetirizine (XYZAL) 5 mg tablet Take 1 Tab by mouth daily. 30 Tab 5    fluticasone (FLONASE) 50 mcg/actuation nasal spray 2 Sprays by Both Nostrils route daily. 1 Bottle 5       Allergies:    Allergies   Allergen Reactions    Other Medication Hives     Allergic, to dust mites, molds    Pollen Extracts Hives         Admission Weight: Last Weight   Weight: 179 lb (81.2 kg) Weight: 179 lb (81.2 kg)       Physical Exam:   Vitals:    Visit Vitals    /80    Pulse 76    Temp 98 °F (36.7 °C)    Resp 12    Wt 179 lb (81.2 kg)    SpO2 99%    BMI 30.73 kg/m2       General:  Well developed AA female, well nourished,  alert, cooperative, in NAD  HEENT: Normocephalic, atraumatic, PERRLA,  and Sclera clear, anicteric  Neck:  supple, no significant adenopathy, carotids upstroke normal bilaterally, no bruits  CVP:  8 cm  ( - ) HJR  Cardiac: PMI diminished, AICD left infraclavicular space, RRR,  S1, S2 normal, no murmur, click, rub or gallop  Chest: breath sounds clear and equal bilaterally  Abdomen: soft, non-tender. Bowel sounds normal. No masses, no organomegaly.   Extremity: extremities normal, atraumatic, no cyanosis or edema  Neuro: A&O x 3 no focal deficits   Skin:   no rashes    Recent Labs:   Lab Results   Component Value Date/Time    WBC 9.5 09/16/2015 08:29 PM    HGB 12.2 09/16/2015 08:29 PM    HCT 36.4 09/16/2015 08:29 PM    PLATELET 206 43/26/9010 08:29 PM    MCV 92.9 09/16/2015 08:29 PM     Lab Results   Component Value Date/Time    TSH 1.840 06/02/2016 08:19 AM      Lab Results   Component Value Date/Time    NT pro-BNP 1400 (H) 09/17/2013 10:17 PM    PROBNP 73 06/20/2018 12:00 AM    PROBNP 80 03/29/2018 12:00 AM      Lab Results   Component Value Date/Time    Sodium 140 06/20/2018 12:00 AM    Potassium 4.2 06/20/2018 12:00 AM    Chloride 101 06/20/2018 12:00 AM    CO2 23 06/20/2018 12:00 AM    Anion gap 9 09/16/2015 08:29 PM    Glucose 81 06/20/2018 12:00 AM    BUN 10 06/20/2018 12:00 AM    Creatinine 0.88 06/20/2018 12:00 AM    BUN/Creatinine ratio 11 06/20/2018 12:00 AM    GFR est  06/20/2018 12:00 AM    GFR est non-AA 88 06/20/2018 12:00 AM    Calcium 9.3 06/20/2018 12:00 AM      Lab Results   Component Value Date/Time    Sodium 140 06/20/2018 12:00 AM    Potassium 4.2 06/20/2018 12:00 AM    Chloride 101 06/20/2018 12:00 AM    CO2 23 06/20/2018 12:00 AM    Anion gap 9 09/16/2015 08:29 PM    Glucose 81 06/20/2018 12:00 AM    BUN 10 06/20/2018 12:00 AM    Creatinine 0.88 06/20/2018 12:00 AM    BUN/Creatinine ratio 11 06/20/2018 12:00 AM    GFR est  06/20/2018 12:00 AM    GFR est non-AA 88 06/20/2018 12:00 AM    Calcium 9.3 06/20/2018 12:00 AM    Bilirubin, total 0.3 09/16/2015 08:29 PM    ALT (SGPT) 18 09/16/2015 08:29 PM    AST (SGOT) 15 09/16/2015 08:29 PM    Alk. phosphatase 57 09/16/2015 08:29 PM    Protein, total 7.9 09/16/2015 08:29 PM    Albumin 3.5 09/16/2015 08:29 PM    Globulin 4.4 (H) 09/16/2015 08:29 PM    A-G Ratio 0.8 (L) 09/16/2015 08:29 PM      Lab Results   Component Value Date/Time    Hemoglobin A1c 5.6 06/02/2016 08:19 AM      I have discussed the diagnosis with  Mere Pascual and the intended plan as seen in the above orders. Questions were answered concerning future plans, and written instructions provided. I have discussed medication side effects and warnings with the patient as well. Thank you for allowing me to participate in the care of this delightful  [facility] . Please do not hesitate to call with any questions. Deborah Horvath RN, ACNP-BC, [address] [phone]  24 hour VAD/HF Pager: [phone]

## 2018-09-24 LAB
BUN SERPL-MCNC: 10 MG/DL (ref 6–20)
BUN/CREAT SERPL: 10 (ref 9–23)
CALCIUM SERPL-MCNC: 9 MG/DL (ref 8.7–10.2)
CHLORIDE SERPL-SCNC: 100 MMOL/L (ref 96–106)
CO2 SERPL-SCNC: 23 MMOL/L (ref 20–29)
CREAT SERPL-MCNC: 0.99 MG/DL (ref 0.57–1)
GLUCOSE SERPL-MCNC: 78 MG/DL (ref 65–99)
NT-PROBNP SERPL-MCNC: 85 PG/ML (ref 0–130)
POTASSIUM SERPL-SCNC: 3.9 MMOL/L (ref 3.5–5.2)
SODIUM SERPL-SCNC: 139 MMOL/L (ref 134–144)
T4 FREE SERPL-MCNC: 1.02 NG/DL (ref 0.82–1.77)
TSH SERPL DL<=0.005 MIU/L-ACNC: 1.33 UIU/ML (ref 0.45–4.5)

## 2018-09-25 ENCOUNTER — TELEPHONE (OUTPATIENT)
Dept: CARDIOLOGY CLINIC | Age: 33
End: 2018-09-25

## 2018-09-25 NOTE — TELEPHONE ENCOUNTER
----- Message from WILMER Kessler sent at 9/24/2018  5:50 PM EDT -----  Labs stable  Continue Current plan  Thank you    I called patient and reviewed all lab results. She states understanding and has no questions.

## 2018-10-02 ENCOUNTER — HOSPITAL ENCOUNTER (OUTPATIENT)
Dept: NUTRITION | Age: 33
Discharge: HOME | End: 2018-10-02
Payer: COMMERCIAL

## 2018-10-02 DIAGNOSIS — E66.9 OBESITY: ICD-10-CM

## 2018-10-02 PROCEDURE — 97802 MEDICAL NUTRITION INDIV IN: CPT | Performed by: DIETITIAN, REGISTERED

## 2018-10-02 NOTE — PROGRESS NOTES
1211   Assessment - Initial Nutrition Evaluation DATE: 10/2/2018 REFERRING PHYSICIAN: Lovely [place] NAME: Sawyer Kemp : 1985 AGE: 29 y.o. GENDER: female REASON FOR VISIT: Obesity ASSESSMENT: 
Past Medical Hx: CHF, AICD, HTN, cardiomyopathy LABS:  
Lab Results Component Value Date/Time Hemoglobin A1c 5.6 2016 08:19 AM  
 
 
MEDICATIONS/SUPPLEMENTS:  
[unfilled] Prior to Admission medications Medication Sig Start Date End Date Taking? Authorizing Provider  
valsartan (DIOVAN) 80 mg tablet Take 1 Tab by mouth two (2) times a day. Indications: chronic heart failure 18   WILMER Leung  
carvedilol (COREG) 25 mg tablet TAKE 1 TABLET BY MOUTH TWICE DAILY WITH MEALS 18   Leticia Novoa MD  
chlorthalidone (HYGROTEN) 25 mg tablet Take 0.5 Tabs by mouth daily as needed. Indications: Edema 18   WILMER Leung  
COQ10, UBIQUINOL, PO Take 100 mg by mouth two (2) times a day. Historical Provider CHOLECALCIFEROL, VITAMIN D3, (VITAMIN D3 PO) Take  by mouth daily. Historical Provider  
levocetirizine (XYZAL) 5 mg tablet Take 1 Tab by mouth daily. 17   Mono Perez MD  
fluticasone (FLONASE) 50 mcg/actuation nasal spray 2 Sprays by Both Nostrils route daily. 17   Jerad Zhong MD  
 
 
FOOD ALLERGIES/INTOLERANCES: None ANTHROPOMETRICS:   
Ht Readings from Last 1 Encounters:  
18 5' 4.02\" (1.626 m) Wt Readings from Last 1 Encounters:  
18 81.2 kg (179 lb) Today's wt: 175 lbs (79.5 kg) IBW:120 # +/- 10%  %IBW: 146 % +/- 10% BMI: 30.1 Category: Obesity class I Reported Wt Hx: 
Pt states in 120's in early s and has slowly gained wt over the years Estimated Nutritional Needs:  
[id number] kcal/day (MSJ x 1.2-1.3 -500) to promote 1 lb wt loss per week Reported Diet Hx: 
Pt eats 2 meals a day, snacks at dinner; trying to follow a 1200 calorie diet for past week 24 Hour Diet Recall Breakfast  Smoothie (spinach, 1/2 avocado, 1/2 c almond milk or wheat toast with avocade Lunch  Baked spaghetti Dinner  fruit Snacks Beverages  Water, coffee Exercise/Physical Actvity: 
Rajinder/hip hop classes 3x week Environmental/Social: 
Works FT as ; has a 10 yo daughter NUTRITION DIAGNOSIS: 
Obesity r/t excessive caloric intake as evidenced by pt with BMI of 31 NUTRITION ASSESSMENT / INTERVENTION: 
Pt seen for obesity. Pt states desiring to lose wt to feel better and improve energy. Pt with hx of heart failure after pregnancy- pt states resolved although on medication. Pt has lost ~4 lbs over past 2 weeks by making dietary changes- has reduced eating out at lunch and has eliminated sugary beverages; consuming 1200 kcal. Discussed importance of eating 3 meals a day using healthy plate method (1/4 lean protein, 1/4 carb, 1/2 non-starchy vegetables). Pt currently skipping dinner (intermittant fasting) in an attempt to lose weight. Recommended increasing vegetables in diet- pt states liking few vegetables (mostly salad). Pt appears to be in the contemplative stage of behavior change; will follow up in one month. PATIENT GOALS: 
 
1. Add 1 serving of vegetables to lunch and dinner 2. Eat 3 meals a day 3. Add lean protein to bfast 
 
Specific tips and techniques to facilitate compliance with above recommendations were provided and discussed. Pt was strongly encourage to begin making necessary changes now, and re-visit the dietitian in one month. If further details are desired please feel free to contact me at [phone]. This phone number was also provided to the patient for any further questions or concerns.    
        Sheldon Fortune RD

## 2018-10-31 ENCOUNTER — OFFICE VISIT (OUTPATIENT)
Dept: INTERNAL MEDICINE CLINIC | Facility: CLINIC | Age: 33
End: 2018-10-31

## 2018-10-31 VITALS
DIASTOLIC BLOOD PRESSURE: 68 MMHG | HEIGHT: 64 IN | HEART RATE: 100 BPM | TEMPERATURE: 99 F | OXYGEN SATURATION: 100 % | BODY MASS INDEX: 29.91 KG/M2 | SYSTOLIC BLOOD PRESSURE: 103 MMHG | WEIGHT: 175.2 LBS | RESPIRATION RATE: 18 BRPM

## 2018-10-31 DIAGNOSIS — J06.9 VIRAL URI WITH COUGH: Primary | ICD-10-CM

## 2018-10-31 NOTE — PATIENT INSTRUCTIONS
Viral Respiratory Infection: Care Instructions  Your Care Instructions    Viruses are very small organisms. They grow in number after they enter your body. There are many types that cause different illnesses, such as colds and the mumps. The symptoms of a viral respiratory infection often start quickly. They include a fever, sore throat, and runny nose. You may also just not feel well. Or you may not want to eat much. Most viral respiratory infections are not serious. They usually get better with time and self-care. Antibiotics are not used to treat a viral infection. That's because antibiotics will not help cure a viral illness. In some cases, antiviral medicine can help your body fight a serious viral infection. Follow-up care is a key part of your treatment and safety. Be sure to make and go to all appointments, and call your doctor if you are having problems. It's also a good idea to know your test results and keep a list of the medicines you take. How can you care for yourself at home? · Rest as much as possible until you feel better. · Be safe with medicines. Take your medicine exactly as prescribed. Call your doctor if you think you are having a problem with your medicine. You will get more details on the specific medicine your doctor prescribes. · Take an over-the-counter pain medicine, such as acetaminophen (Tylenol), ibuprofen (Advil, Motrin), or naproxen (Aleve), as needed for pain and fever. Read and follow all instructions on the label. Do not give aspirin to anyone younger than 20. It has been linked to Reye syndrome, a serious illness. · Drink plenty of fluids, enough so that your urine is light yellow or clear like water. Hot fluids, such as tea or soup, may help relieve congestion in your nose and throat. If you have kidney, heart, or liver disease and have to limit fluids, talk with your doctor before you increase the amount of fluids you drink.   · Try to clear mucus from your lungs by breathing deeply and coughing. · Gargle with warm salt water once an hour. This can help reduce swelling and throat pain. Use 1 teaspoon of salt mixed in 1 cup of warm water. · Do not smoke or allow others to smoke around you. If you need help quitting, talk to your doctor about stop-smoking programs and medicines. These can increase your chances of quitting for good. To avoid spreading the virus  · Cough or sneeze into a tissue. Then throw the tissue away. · If you don't have a tissue, use your hand to cover your cough or sneeze. Then clean your hand. You can also cough into your sleeve. · Wash your hands often. Use soap and warm water. Wash for 15 to 20 seconds each time. · If you don't have soap and water near you, you can clean your hands with alcohol wipes or gel. When should you call for help? Call your doctor now or seek immediate medical care if:    · You have a new or higher fever.     · Your fever lasts more than 48 hours.     · You have trouble breathing.     · You have a fever with a stiff neck or a severe headache.     · You are sensitive to light.     · You feel very sleepy or confused.    Watch closely for changes in your health, and be sure to contact your doctor if:    · You do not get better as expected. Where can you learn more? Go to http://desiree-kinga.info/. Enter I069 in the search box to learn more about \"Viral Respiratory Infection: Care Instructions. \"  Current as of: December 6, 2017  Content Version: 11.8  © 5521-2922 [employer]. Care instructions adapted under license by [employer] (which disclaims liability or warranty for this information). If you have questions about a medical condition or this instruction, always ask your healthcare professional. [address] any warranty or liability for your use of this information.

## 2018-10-31 NOTE — PROGRESS NOTES
Subjective:      Mac Villeda is a 29 y.o. female who presents today for cough. Cough: she states this has been persistent for 6, cough is dry. She denies wheezing, SOB, fevers, congestion. She has no medications. Overall symptoms are getting better. Patient Active Problem List    Diagnosis Date Noted    Class 1 obesity due to excess calories without serious comorbidity with body mass index (BMI) of 30.0 to 30.9 in adult 09/20/2018    Other fatigue 11/95/2590    Systolic CHF, chronic ([facility] [address] 70/03/1373    Systolic congestive heart failure with reduced left ventricular function, NYHA class 2 ([address] 03/29/2018    HTN (hypertension) 05/12/2015    ICD (implantable cardioverter-defibrillator) in place 05/12/2015    Cardiomyopathy, peripartum, postpartum 02/10/2015    Vitamin D deficiency 11/29/2011    Snoring 11/23/2011     Current Outpatient Medications   Medication Sig Dispense Refill    valsartan (DIOVAN) 80 mg tablet Take 1 Tab by mouth two (2) times a day. Indications: chronic heart failure 60 Tab 3    carvedilol (COREG) 25 mg tablet TAKE 1 TABLET BY MOUTH TWICE DAILY WITH MEALS 60 Tab 6    chlorthalidone (HYGROTEN) 25 mg tablet Take 0.5 Tabs by mouth daily as needed. Indications: Edema 30 Tab 0    COQ10, UBIQUINOL, PO Take 100 mg by mouth two (2) times a day.  CHOLECALCIFEROL, VITAMIN D3, (VITAMIN D3 PO) Take  by mouth daily.  levocetirizine (XYZAL) 5 mg tablet Take 1 Tab by mouth daily. 30 Tab 5    fluticasone (FLONASE) 50 mcg/actuation nasal spray 2 Sprays by Both Nostrils route daily.  1 Bottle 5     Allergies   Allergen Reactions    Other Medication Hives     Allergic, to dust mites, molds    Pollen Extracts Hives     Past Medical History:   Diagnosis Date    AICD (automatic cardioverter/defibrillator) present     Anemia NEC     Cardiomyopathy, peripartum, postpartum 2/10/2015    ECHO 2013: EF 50%, mild MR/TR  ECHO 1/2015: EF 30-35% ECHO 5/2015:LVD, EF 25-30%, [address] ECHO 2016: EF 50% ECHO 2017: EF 40-45%, mild DHK, LVH, RV mild dilated ECHO 2018: EF 50%, no WMA, wall thickness < no (.8-.9 cm), mil MR/TR,   CARDIAC MRI 2016: LVEF 50%, mild GHK, RVEF 60%   AICD 2015: Biotronik     Class 1 obesity due to excess calories without serious comorbidity with body mass index (BMI) of 30.0 to 30.9 in adult 2018    Contact dermatitis and other eczema, due to unspecified cause     hives    Hypertension     Other fatigue     Systolic congestive heart failure with reduced left ventricular function, NYHA class 2 ([address] 3/29/2018     Past Surgical History:   Procedure Laterality Date    HX GYN  13        HX IMPLANTABLE CARDIOVERTER DEFIBRILLATOR  2015    [employer] #[id number]        Review of Systems    A comprehensive review of systems was negative except for that written in the HPI. Objective:     Visit Vitals  /68   Pulse 100   Temp 99 °F (37.2 °C)   Resp 18   Ht 5' 4.02\" (1.626 m)   Wt 175 lb 3.2 oz (79.5 kg)   SpO2 100%   BMI 30.05 kg/m²     General appearance: alert, cooperative, no distress, appears stated age  Head: Normocephalic, without obvious abnormality, atraumatic  Eyes: negative  Ears: abnormal TM AD - serous middle ear fluid, abnormal TM AS - serous middle ear fluid  Nose: Nares normal. Septum midline. Mucosa normal. No drainage or sinus tenderness. Throat: Lips, mucosa, and tongue normal. Teeth and gums normal  Neck: supple, symmetrical, trachea midline and no adenopathy  Lungs: clear to auscultation bilaterally  Heart: regular rate and rhythm, S1, S2 normal, no murmur, click, rub or gallop  Neurologic: Grossly normal  Nursing note and vitals reviewed  Assessment/Plan:       ICD-10-CM ICD-9-CM    1. Viral URI with cough J06.9 465.9     B97.89     OTC therapy recommended    Follow-up Disposition:  Return if symptoms worsen or fail to improve.      Advised her to call back or return to office if symptoms worsen/change/persist.  Discussed expected course/resolution/complications of diagnosis in detail with patient. Medication risks/benefits/costs/interactions/alternatives discussed with patient. She was given an after visit summary which includes diagnoses, current medications, & vitals. She expressed understanding with the diagnosis and plan.

## 2018-11-06 ENCOUNTER — OFFICE VISIT (OUTPATIENT)
Dept: CARDIOLOGY CLINIC | Age: 33
End: 2018-11-06

## 2018-11-06 VITALS
WEIGHT: 177 LBS | SYSTOLIC BLOOD PRESSURE: 99 MMHG | RESPIRATION RATE: 16 BRPM | BODY MASS INDEX: 30.22 KG/M2 | HEIGHT: 64 IN | DIASTOLIC BLOOD PRESSURE: 71 MMHG | OXYGEN SATURATION: 100 % | HEART RATE: 84 BPM

## 2018-11-06 DIAGNOSIS — I50.22 SYSTOLIC CHF, CHRONIC (HCC): ICD-10-CM

## 2018-11-06 DIAGNOSIS — I10 ESSENTIAL HYPERTENSION: Primary | ICD-10-CM

## 2018-11-06 DIAGNOSIS — Z95.810 ICD (IMPLANTABLE CARDIOVERTER-DEFIBRILLATOR) IN PLACE: ICD-10-CM

## 2018-11-06 NOTE — PROGRESS NOTES
Chief Complaint   Patient presents with    Hypertension    Cardiomyopathy    CHF   1. Have you been to the ER, urgent care clinic since your last visit? Hospitalized since your last visit? No    2. Have you seen or consulted any other health care providers outside of the [address] [place] [address] since your last visit? Include any pap smears or colon screening.  No

## 2018-11-06 NOTE — PROGRESS NOTES
[facility]   [address] [facility], [address] [place] [address] Road                                          NEW PATIENT HPI/FOLLOW-UP      NAME:  Sawyer Kemp   :   1985   MRN:   [id number]   PCP:  Iantha Cushing, MD           Subjective: The patient is a 29y.o. year old female  who returns for a routine follow-up. Since the last visit, patient reports no new symptoms. F/U [facility] unremarkable. Echo reveals uptick in LVEF 55-60%. Denies change in exercise tolerance, chest pain, edema, medication intolerance, palpitations, shortness of breath, PND/orthopnea wheezing, sputum, syncope, dizziness or light headedness. Doing satisfactorily. Review of Systems  General: Pt denies excessive weight gain or loss. Pt is able to conduct ADL's. Respiratory: Denies shortness of breath, MOULTON, wheezing or stridor.   Cardiovascular: Denies precordial pain, palpitations, edema or PND  Gastrointestinal: Denies poor appetite, indigestion, abdominal pain or blood in stool  Peripheral vascular: Denies claudication, leg cramps  Neuropsychiatric: Denies paresthesias,tingling,numbness,anxiety,depression,fatigue  Musculoskeletal: Denies pain,tenderness, soreness,swelling      Past Medical History:   Diagnosis Date    AICD (automatic cardioverter/defibrillator) present     Anemia NEC     Cardiomyopathy, peripartum, postpartum 2/10/2015    ECHO 2013: EF 50%, mild MR/TR  ECHO 2015: EF 30-35% ECHO 2015:LVD, EF 25-30%, [address] ECHO 2016: EF 50% ECHO 2017: EF 40-45%, mild DHK, LVH, RV mild dilated ECHO 2018: EF 50%, no WMA, wall thickness < no (.8-.9 cm), mil MR/TR,   CARDIAC MRI 2016: LVEF 50%, mild GHK, RVEF 60%   AICD : Biotronik     Class 1 obesity due to excess calories without serious comorbidity with body mass index (BMI) of 30.0 to 30.9 in adult 2018    Contact dermatitis and other eczema, due to unspecified cause     hives    Hypertension     Other fatigue     Systolic congestive heart failure with reduced left ventricular function, NYHA class 2 ([facility] 3/29/2018     Patient Active Problem List    Diagnosis Date Noted    Class 1 obesity due to excess calories without serious comorbidity with body mass index (BMI) of 30.0 to 30.9 in adult 2018    Other fatigue     Systolic CHF, chronic ([facility]     Systolic congestive heart failure with reduced left ventricular function, NYHA class 2 ([facility] 2018    HTN (hypertension) 2015    ICD (implantable cardioverter-defibrillator) in place 2015    Cardiomyopathy, peripartum, postpartum 02/10/2015    Vitamin D deficiency 2011    Snoring 2011      Past Surgical History:   Procedure Laterality Date    HX GYN  13        HX IMPLANTABLE CARDIOVERTER DEFIBRILLATOR  2015    [employer] #[id number]     Allergies   Allergen Reactions    Other Medication Hives     Allergic, to dust mites, molds    Pollen Extracts Hives      Family History   Problem Relation Age of Onset    Hypertension Mother     Hypertension Father     Diabetes Maternal Grandmother     Heart Disease Maternal Grandmother     Arthritis-osteo Maternal Grandfather     Asthma Brother     Arthritis-osteo Paternal Grandmother     Arthritis-osteo Paternal Grandfather       Social History     Socioeconomic History    Marital status:      Spouse name: Not on file    Number of children: 0    Years of education: Not on file    Highest education level: Not on file   Social Needs    Financial resource strain: Not on file    Food insecurity - worry: Not on file    Food insecurity - inability: Not on file   VDI Laboratory needs - medical: Not on file   VDI Laboratory needs - non-medical: Not on file   Occupational History    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 Indications: chronic heart failure    carvedilol (COREG) 25 mg tablet TAKE 1 TABLET BY MOUTH TWICE DAILY WITH MEALS    chlorthalidone (HYGROTEN) 25 mg tablet Take 0.5 Tabs by mouth daily as needed. Indications: Edema (Patient taking differently: Take  by mouth daily as needed.)    COQ10, UBIQUINOL, PO Take 100 mg by mouth two (2) times a day.  CHOLECALCIFEROL, VITAMIN D3, (VITAMIN D3 PO) Take  by mouth daily.  levocetirizine (XYZAL) 5 mg tablet Take 1 Tab by mouth daily.  fluticasone (FLONASE) 50 mcg/actuation nasal spray 2 Sprays by Both Nostrils route daily. No current facility-administered medications for this visit. I have reviewed the nurses notes, vitals, problem list, allergy list, medical history, family medical, social history and medications. Objective:     Physical Exam:     Vitals:    11/06/18 0908   BP: 99/71   Pulse: 84   Resp: 16   SpO2: 100%   Weight: 177 lb (80.3 kg)   Height: 5' 4\" (1.626 m)    Body mass index is 30.38 kg/m². General: Well developed, in no acute distress. HEENT: No carotid bruits, no JVD, trach is midline. Heart:  Normal S1/S2 negative S3 or S4. Regular, no murmur, gallop or rub.   Respiratory: Clear bilaterally, no wheezing or rales  Abdomen:   Soft, non-tender, bowel sounds are active.   Extremities:  No edema, normal cap refill, no cyanosis. Neuro: A&Ox3, speech clear, gait stable. Skin: Skin color is normal. No rashes or lesions. No diaphoresis.   Vascular: 2+ pulses symmetric in all extremities        Data Review:       Cardiographics:    EKG: NSR,WNL    Cardiology Labs:    Results for orders placed or performed during the hospital encounter of 09/16/15   EKG, 12 LEAD, INITIAL   Result Value Ref Range    Ventricular Rate 73 BPM    Atrial Rate 73 BPM    P-R Interval 138 ms    QRS Duration 82 ms    Q-T Interval 448 ms    QTC Calculation (Bezet) 493 ms    Calculated P Axis 51 degrees    Calculated R Axis 44 degrees    Calculated T Axis 2 degrees    Diagnosis       Normal sinus rhythm  Normal ekg  Confirmed by Mack Gonzalez ([id number]) on 9/17/2015 7:29:30 AM         Lab Results   Component Value Date/Time    Cholesterol, total 139 06/02/2016 08:19 AM    HDL Cholesterol 45 06/02/2016 08:19 AM    LDL, calculated 76 06/02/2016 08:19 AM    Triglyceride 89 06/02/2016 08:19 AM       Lab Results   Component Value Date/Time    Sodium 139 09/20/2018 12:00 AM    Potassium 3.9 09/20/2018 12:00 AM    Chloride 100 09/20/2018 12:00 AM    CO2 23 09/20/2018 12:00 AM    Anion gap 9 09/16/2015 08:29 PM    Glucose 78 09/20/2018 12:00 AM    BUN 10 09/20/2018 12:00 AM    Creatinine 0.99 09/20/2018 12:00 AM    BUN/Creatinine ratio 10 09/20/2018 12:00 AM    GFR est AA 88 09/20/2018 12:00 AM    GFR est non-AA 76 09/20/2018 12:00 AM    Calcium 9.0 09/20/2018 12:00 AM    Bilirubin, total 0.3 09/16/2015 08:29 PM    AST (SGOT) 15 09/16/2015 08:29 PM    Alk. phosphatase 57 09/16/2015 08:29 PM    Protein, total 7.9 09/16/2015 08:29 PM    Albumin 3.5 09/16/2015 08:29 PM    Globulin 4.4 (H) 09/16/2015 08:29 PM    A-G Ratio 0.8 (L) 09/16/2015 08:29 PM    ALT (SGPT) 18 09/16/2015 08:29 PM          Assessment:       ICD-10-CM ICD-9-CM    1. Essential hypertension I10 401.9 AMB POC EKG ROUTINE W/ 12 LEADS, INTER & REP   2. Systolic CHF, chronic (HCC) I50.22 428.22      428.0    3. ICD (implantable cardioverter-defibrillator) in place Z95.810 V45.02    4. Cardiomyopathy, peripartum, postpartum O90.3 674.54          Discussion: Patient presents at this time stable from a cardiac perspective.  CSHF compensated and LVEF now normal on Rx. Pleased with present cardiac status. Plan: 1. Continue same meds. Lipid profile and labs followed by PCP. 2.Encouraged to exercise to tolerance, lose weight and follow low fat, low cholesterol, low sodium predominantly Plant-based (consider Mediterranean) diet. Call with questions or concerns. Will follow up any test results by phone and/or f/u here in office if needed. Derek Salcido 3.Follow up: 6 MONTHS    I have discussed the diagnosis with the patient and the intended plan as seen in the above orders. The patient has received an after-visit summary and questions were answered concerning future plans. I have discussed any concerning medication side effects and warnings with the patient as well.     Paola Doyle MD  11/6/2018

## 2018-11-14 DIAGNOSIS — R06.83 SNORING: ICD-10-CM

## 2018-11-14 DIAGNOSIS — Z95.810 ICD (IMPLANTABLE CARDIOVERTER-DEFIBRILLATOR) IN PLACE: ICD-10-CM

## 2018-11-14 DIAGNOSIS — I10 ESSENTIAL HYPERTENSION: ICD-10-CM

## 2018-11-14 DIAGNOSIS — I50.22 SYSTOLIC CHF, CHRONIC (HCC): ICD-10-CM

## 2018-11-15 RX ORDER — VALSARTAN 80 MG/1
80 TABLET ORAL 2 TIMES DAILY
Qty: 60 TAB | Refills: 3 | Status: SHIPPED | OUTPATIENT
Start: 2018-11-15 | End: 2019-05-31 | Stop reason: SDUPTHER

## 2018-11-16 ENCOUNTER — CLINICAL SUPPORT (OUTPATIENT)
Dept: CARDIOLOGY CLINIC | Age: 33
End: 2018-11-16

## 2018-11-16 DIAGNOSIS — I50.22 SYSTOLIC CHF, CHRONIC (HCC): ICD-10-CM

## 2018-11-16 DIAGNOSIS — Z95.810 PRESENCE OF AUTOMATIC CARDIOVERTER/DEFIBRILLATOR (AICD): Primary | ICD-10-CM

## 2019-01-04 ENCOUNTER — OFFICE VISIT (OUTPATIENT)
Dept: INTERNAL MEDICINE CLINIC | Facility: CLINIC | Age: 34
End: 2019-01-04

## 2019-01-04 VITALS
SYSTOLIC BLOOD PRESSURE: 113 MMHG | WEIGHT: 179 LBS | HEART RATE: 97 BPM | HEIGHT: 64 IN | BODY MASS INDEX: 30.56 KG/M2 | TEMPERATURE: 98.4 F | RESPIRATION RATE: 16 BRPM | DIASTOLIC BLOOD PRESSURE: 77 MMHG

## 2019-01-04 DIAGNOSIS — I10 ESSENTIAL HYPERTENSION: ICD-10-CM

## 2019-01-04 DIAGNOSIS — M67.40 GANGLION CYST: Primary | ICD-10-CM

## 2019-01-04 NOTE — PROGRESS NOTES
Subjective:  
  
Yoselin Lee is a 29 y.o. female who presents today for Chief Complaint Patient presents with  Cyst  
  cyst on on her right hand. patient in today to have a knot evaluated on her right hand. Area is not tender. She has noticed it getting bigger. It does not impede movement of her hand or wrist. 
 
 
Patient Active Problem List  
 Diagnosis Date Noted  Class 1 obesity due to excess calories without serious comorbidity with body mass index (BMI) of 30.0 to 30.9 in adult 09/20/2018  Other fatigue 09/20/2018  Systolic CHF, chronic ([address] 05/01/2018  Systolic congestive heart failure with reduced left ventricular function, NYHA class 2 ([address] 03/29/2018  
 HTN (hypertension) 05/12/2015  ICD (implantable cardioverter-defibrillator) in place 05/12/2015  Cardiomyopathy, peripartum, postpartum--resolved on Rx. 02/10/2015  Vitamin D deficiency 11/29/2011  Snoring 11/23/2011 Current Outpatient Medications Medication Sig Dispense Refill  valsartan (DIOVAN) 80 mg tablet TAKE 1 TAB BY MOUTH TWO (2) TIMES A DAY. INDICATIONS: CHRONIC HEART FAILURE 60 Tab 3  carvedilol (COREG) 25 mg tablet TAKE 1 TABLET BY MOUTH TWICE DAILY WITH MEALS 60 Tab 6  chlorthalidone (HYGROTEN) 25 mg tablet Take 0.5 Tabs by mouth daily as needed. Indications: Edema (Patient taking differently: Take  by mouth daily as needed.) 30 Tab 0  
 COQ10, UBIQUINOL, PO Take 100 mg by mouth two (2) times a day.  CHOLECALCIFEROL, VITAMIN D3, (VITAMIN D3 PO) Take  by mouth daily.  levocetirizine (XYZAL) 5 mg tablet Take 1 Tab by mouth daily. 30 Tab 5  
 fluticasone (FLONASE) 50 mcg/actuation nasal spray 2 Sprays by Both Nostrils route daily. 1 Bottle 5 Allergies Allergen Reactions  Other Medication Hives Allergic, to dust mites, molds  Pollen Extracts Hives Past Medical History:  
Diagnosis Date  AICD (automatic cardioverter/defibrillator) present  Anemia NEC   
  Cardiomyopathy, peripartum, postpartum 2/10/2015 ECHO 2013: EF 50%, mild MR/TR  ECHO 2015: EF 30-35% ECHO 2015:LVD, EF 25-30%, [address] ECHO 2016: EF 50% ECHO 2017: EF 40-45%, mild DHK, LVH, RV mild dilated ECHO 2018: EF 50%, no WMA, wall thickness < no (.8-.9 cm), mil MR/TR,   CARDIAC MRI 2016: LVEF 50%, mild GHK, RVEF 60%   AICD : Biotronik  Class 1 obesity due to excess calories without serious comorbidity with body mass index (BMI) of 30.0 to 30.9 in adult 2018  Contact dermatitis and other eczema, due to unspecified cause   
 hives  Hypertension  Other fatigue 2018  Systolic congestive heart failure with reduced left ventricular function, NYHA class 2 ([address] 3/29/2018 Past Surgical History:  
Procedure Laterality Date  HX GYN  13   HX IMPLANTABLE CARDIOVERTER DEFIBRILLATOR  2015 [employer] #[id number] Family History Problem Relation Age of Onset  Hypertension Mother  Hypertension Father  Diabetes Maternal Grandmother  Heart Disease Maternal Grandmother  Arthritis-osteo Maternal Grandfather  Asthma Brother  Arthritis-osteo Paternal Grandmother  Arthritis-osteo Paternal Grandfather Social History Tobacco Use  Smoking status: Never Smoker  Smokeless tobacco: Never Used Substance Use Topics  Alcohol use: No  
  
 
Review of Systems A comprehensive review of systems was negative except for that written in the HPI. Objective:  
 
Visit Vitals /77 Pulse 97 Temp 98.4 °F (36.9 °C) (Oral) Resp 16 Ht 5' 4\" (1.626 m) Wt 179 lb (81.2 kg) LMP 2018 (Exact Date) BMI 30.73 kg/m² General:  Alert, cooperative, no distress, appears stated age. Head:  Normocephalic, without obvious abnormality, atraumatic. Eyes:  Conjunctivae/corneas clear. PERRL, EOMs intact. Fundi benign. Ears:  Normal TMs and external ear canals both ears. Nose: Nares normal. Septum midline. Mucosa normal. No drainage or sinus tenderness. Throat: Lips, mucosa, and tongue normal. Teeth and gums normal.  
Neck: Supple, symmetrical, trachea midline, no adenopathy, thyroid: no enlargement/tenderness/nodules, no carotid bruit and no JVD. Back:   Symmetric, no curvature. ROM normal. No CVA tenderness. Lungs:   Clear to auscultation bilaterally. Chest wall:  No tenderness or deformity. Heart:  Regular rate and rhythm, S1, S2 normal, no murmur, click, rub or gallop. Abdomen:   Soft, non-tender. Bowel sounds normal. No masses,  No organomegaly. Extremities: Dorsum right wrist, small firm  nodule mobile, nontender Pulses: 2+ and symmetric all extremities. Neurologic: CNII-XII intact. Normal strength, sensation and reflexes throughout. Assessment/Plan: ICD-10-CM ICD-9-CM 1. Ganglion cyst M67.40 727.43 REFERRAL TO ORTHOPEDIC SURGERY 2. Essential hypertension I10 401.9 BP stable on current management Follow-up Disposition: Not on File Advised her to call back or return to office if symptoms worsen/change/persist. 
Discussed expected course/resolution/complications of diagnosis in detail with patient. Medication risks/benefits/costs/interactions/alternatives discussed with patient. She was given an after visit summary which includes diagnoses, current medications, & vitals. She expressed understanding with the diagnosis and plan.

## 2019-01-04 NOTE — PROGRESS NOTES
Chief Complaint Patient presents with  Cyst  
  cyst on on her right hand. 1. Have you been to the ER, urgent care clinic since your last visit? Hospitalized since your last visit? No 
 
2. Have you seen or consulted any other health care providers outside of the [address] [place] [address] since your last visit? Include any pap smears or colon screening.  No

## 2019-02-15 ENCOUNTER — CLINICAL SUPPORT (OUTPATIENT)
Dept: CARDIOLOGY CLINIC | Age: 34
End: 2019-02-15

## 2019-02-15 DIAGNOSIS — Z95.810 PRESENCE OF AUTOMATIC CARDIOVERTER/DEFIBRILLATOR (AICD): Primary | ICD-10-CM

## 2019-02-18 DIAGNOSIS — Z95.810 ICD (IMPLANTABLE CARDIOVERTER-DEFIBRILLATOR) IN PLACE: ICD-10-CM

## 2019-02-18 DIAGNOSIS — I10 ESSENTIAL HYPERTENSION: ICD-10-CM

## 2019-02-18 RX ORDER — CARVEDILOL 25 MG/1
TABLET ORAL
Qty: 60 TAB | Refills: 5 | Status: SHIPPED | OUTPATIENT
Start: 2019-02-18 | End: 2019-09-01 | Stop reason: SDUPTHER

## 2019-03-11 ENCOUNTER — OFFICE VISIT (OUTPATIENT)
Dept: INTERNAL MEDICINE CLINIC | Facility: CLINIC | Age: 34
End: 2019-03-11

## 2019-03-11 VITALS
HEART RATE: 82 BPM | BODY MASS INDEX: 27.49 KG/M2 | RESPIRATION RATE: 16 BRPM | TEMPERATURE: 98.7 F | DIASTOLIC BLOOD PRESSURE: 73 MMHG | SYSTOLIC BLOOD PRESSURE: 113 MMHG | WEIGHT: 161 LBS | HEIGHT: 64 IN

## 2019-03-11 DIAGNOSIS — B02.9 HERPES ZOSTER WITHOUT COMPLICATION: Primary | ICD-10-CM

## 2019-03-11 RX ORDER — METHYLPREDNISOLONE 4 MG/1
TABLET ORAL
Qty: 1 DOSE PACK | Refills: 0 | Status: SHIPPED | OUTPATIENT
Start: 2019-03-11 | End: 2019-03-17

## 2019-03-11 RX ORDER — VALACYCLOVIR HYDROCHLORIDE 1 G/1
TABLET, FILM COATED ORAL
COMMUNITY
Start: 2019-03-08 | End: 2020-07-09

## 2019-03-11 NOTE — PROGRESS NOTES
Subjective:      Rahul Pichardo is a 29 y.o. female who presents today for   Chief Complaint   Patient presents with    Shingles   was seen at [facility] on Friday  Symptoms started on wed, lower back pain and rash  She says it started on the right side of lower back  She has been taking valtrex 3 times daily but says now rash is spreading  She is now noticing on arm and chest right sided  She is not taking any immunosuppressants      Patient Active Problem List    Diagnosis Date Noted    Class 1 obesity due to excess calories without serious comorbidity with body mass index (BMI) of 30.0 to 30.9 in adult 09/20/2018    Other fatigue 98/66/5263    Systolic CHF, chronic ([facility] 23/65/6063    Systolic congestive heart failure with reduced left ventricular function, NYHA class 2 ([facility] 03/29/2018    HTN (hypertension) 05/12/2015    ICD (implantable cardioverter-defibrillator) in place 05/12/2015    Cardiomyopathy, peripartum, postpartum--resolved on Rx. 02/10/2015    Vitamin D deficiency 11/29/2011    Snoring 11/23/2011     Current Outpatient Medications   Medication Sig Dispense Refill    valACYclovir (VALTREX) 1 gram tablet       carvedilol (COREG) 25 mg tablet TAKE 1 TABLET BY MOUTH TWICE A DAY WITH MEALS 60 Tab 5    valsartan (DIOVAN) 80 mg tablet TAKE 1 TAB BY MOUTH TWO (2) TIMES A DAY. INDICATIONS: CHRONIC HEART FAILURE 60 Tab 3    COQ10, UBIQUINOL, PO Take 100 mg by mouth two (2) times a day.  CHOLECALCIFEROL, VITAMIN D3, (VITAMIN D3 PO) Take  by mouth daily.  levocetirizine (XYZAL) 5 mg tablet Take 1 Tab by mouth daily. 30 Tab 5    fluticasone (FLONASE) 50 mcg/actuation nasal spray 2 Sprays by Both Nostrils route daily. 1 Bottle 5    chlorthalidone (HYGROTEN) 25 mg tablet Take 0.5 Tabs by mouth daily as needed.  Indications: Edema (Patient taking differently: Take  by mouth daily as needed.) 30 Tab 0     Allergies   Allergen Reactions    Other Medication Hives     Allergic, to dust mites, molds    Pollen Extracts Hives     Past Medical History:   Diagnosis Date    AICD (automatic cardioverter/defibrillator) present     Anemia NEC     Cardiomyopathy, peripartum, postpartum 2/10/2015    ECHO : EF 50%, mild MR/TR  ECHO 2015: EF 30-35% ECHO 2015:LVD, EF 25-30%, [address] ECHO : EF 50% ECHO : EF 40-45%, mild DHK, LVH, RV mild dilated ECHO 2018: EF 50%, no WMA, wall thickness < no (.8-.9 cm), mil MR/TR,   CARDIAC MRI 2016: LVEF 50%, mild GHK, RVEF 60%   AICD : Biotronik     Class 1 obesity due to excess calories without serious comorbidity with body mass index (BMI) of 30.0 to 30.9 in adult 2018    Contact dermatitis and other eczema, due to unspecified cause     hives    Hypertension     Other fatigue 3/59/8132    Systolic congestive heart failure with reduced left ventricular function, NYHA class 2 ([address] 3/29/2018     Past Surgical History:   Procedure Laterality Date    HX GYN  13        HX IMPLANTABLE CARDIOVERTER DEFIBRILLATOR  2015    [employer] #[id number]     Family History   Problem Relation Age of Onset    Hypertension Mother     Hypertension Father     Diabetes Maternal Grandmother     Heart Disease Maternal Grandmother     Arthritis-osteo Maternal Grandfather     Asthma Brother     Arthritis-osteo Paternal Grandmother     Arthritis-osteo Paternal Grandfather      Social History     Tobacco Use    Smoking status: Never Smoker    Smokeless tobacco: Never Used   Substance Use Topics    Alcohol use: No        Review of Systems    A comprehensive review of systems was negative except for that written in the HPI. Objective:     Visit Vitals  /73   Pulse 82   Temp 98.7 °F (37.1 °C) (Oral)   Resp 16   Ht 5' 4\" (1.626 m)   Wt 161 lb (73 kg)   LMP 2019   BMI 27.64 kg/m²     General:  Alert, cooperative, no distress, appears stated age. Head:  Normocephalic, without obvious abnormality, atraumatic.    Eyes: Conjunctivae/corneas clear. PERRL, EOMs intact. Fundi benign. Ears:  Normal TMs and external ear canals both ears. Nose: Nares normal. Septum midline. Mucosa normal. No drainage or sinus tenderness. Throat: Lips, mucosa, and tongue normal. Teeth and gums normal.   Neck: Supple, symmetrical, trachea midline, no adenopathy, thyroid: no enlargement/tenderness/nodules, no carotid bruit and no JVD. Back:   Symmetric, no curvature. ROM normal. No CVA tenderness. Lungs:   Clear to auscultation bilaterally. Chest wall:  No tenderness or deformity. Heart:  Regular rate and rhythm, S1, S2 normal, no murmur, click, rub or gallop. Extremities: Extremities normal, atraumatic, no cyanosis or edema. Pulses: 2+ and symmetric all extremities. Skin: Erythematous papules and vesicles on right lower back, right upper arm and right chest wall               Assessment/Plan:       ICD-10-CM ICD-9-CM    1. 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  Discussed expected course/resolution/complications of diagnosis in detail with patient. Medication risks/benefits/costs/interactions/alternatives discussed with patient. She was given an after visit summary which includes diagnoses, current medications, & vitals. She expressed understanding with the diagnosis and plan.

## 2019-03-11 NOTE — PROGRESS NOTES
Chief Complaint   Patient presents with    Shingles     1. Have you been to the ER, urgent care clinic since your last visit? Hospitalized since your last visit? Yes When: 3/8/19 Where: [facility] Reason for visit: pain    2. Have you seen or consulted any other health care providers outside of the [address] [place] [address] since your last visit? Include any pap smears or colon screening.  No

## 2019-05-07 ENCOUNTER — OFFICE VISIT (OUTPATIENT)
Dept: CARDIOLOGY CLINIC | Age: 34
End: 2019-05-07

## 2019-05-07 VITALS
HEART RATE: 90 BPM | HEIGHT: 64 IN | WEIGHT: 167 LBS | RESPIRATION RATE: 18 BRPM | DIASTOLIC BLOOD PRESSURE: 71 MMHG | OXYGEN SATURATION: 98 % | BODY MASS INDEX: 28.51 KG/M2 | SYSTOLIC BLOOD PRESSURE: 106 MMHG

## 2019-05-07 DIAGNOSIS — Z95.810 ICD (IMPLANTABLE CARDIOVERTER-DEFIBRILLATOR) IN PLACE: ICD-10-CM

## 2019-05-07 DIAGNOSIS — I10 ESSENTIAL HYPERTENSION: ICD-10-CM

## 2019-05-07 DIAGNOSIS — I50.22 SYSTOLIC CHF, CHRONIC (HCC): Primary | ICD-10-CM

## 2019-05-07 NOTE — PROGRESS NOTES
Chief Complaint   Patient presents with    Follow-up    CHF    Hypertension    Cardiomyopathy     1. Have you been to the ER, urgent care clinic since your last visit? Hospitalized since your last visit? Yes, March, [facility], Back Pain. 2. Have you seen or consulted any other health care providers outside of the [address] [place] [address] since your last visit? Include any pap smears or colon screening.  No

## 2019-05-07 NOTE — PROGRESS NOTES
[facility]   [address] [facility], [address] [place] [address]                                          NEW PATIENT HPI/FOLLOW-UP      NAME:  Gustavo Samayoa   :   1985   MRN:   [id number]   PCP:  Kyle Rosales MD           Subjective: The patient is a 29y.o. year old female  who returns for a routine follow-up. Since the last visit, patient reports bout of Shingles on RX. Viola Gearing Denies change in exercise tolerance, chest pain, edema, medication intolerance, palpitations, shortness of breath, PND/orthopnea wheezing, sputum, syncope, dizziness or light headedness. Doing satisfactorily. Review of Systems  General: Pt denies excessive weight gain or loss. Pt is able to conduct ADL's. Respiratory: Denies shortness of breath, MOULTON, wheezing or stridor.   Cardiovascular: Denies precordial pain, palpitations, edema or PND  Gastrointestinal: Denies poor appetite, indigestion, abdominal pain or blood in stool  Peripheral vascular: Denies claudication, leg cramps  Neuropsychiatric: Denies paresthesias,tingling,numbness,anxiety,depression,fatigue  Musculoskeletal: Denies pain,tenderness, soreness,swelling      Past Medical History:   Diagnosis Date    AICD (automatic cardioverter/defibrillator) present     Anemia NEC     Cardiomyopathy, peripartum, postpartum 2/10/2015    ECHO 2013: EF 50%, mild MR/TR  ECHO 2015: EF 30-35% ECHO 2015:LVD, EF 25-30%, [address] ECHO 2016: EF 50% ECHO 2017: EF 40-45%, mild DHK, LVH, RV mild dilated ECHO 2018: EF 50%, no WMA, wall thickness < no (.8-.9 cm), mil MR/TR,   CARDIAC MRI 2016: LVEF 50%, mild GHK, RVEF 60%   AICD : Biotronik     Class 1 obesity due to excess calories without serious comorbidity with body mass index (BMI) of 30.0 to 30.9 in adult 2018    Contact dermatitis and other eczema, due to unspecified cause     hives    Hypertension     Other fatigue     Systolic congestive heart failure with reduced left ventricular function, NYHA class 2 ([facility] 3/29/2018     Patient Active Problem List    Diagnosis Date Noted    Class 1 obesity due to excess calories without serious comorbidity with body mass index (BMI) of 30.0 to 30.9 in adult 2018    Other fatigue 570    Systolic CHF, chronic ([facility]     Systolic congestive heart failure with reduced left ventricular function, NYHA class 2 ([facility] 2018    HTN (hypertension) 2015    ICD (implantable cardioverter-defibrillator) in place 2015    Cardiomyopathy, peripartum, postpartum--resolved on Rx. 02/10/2015    Vitamin D deficiency 2011    Snoring 2011      Past Surgical History:   Procedure Laterality Date    HX GYN  13        HX IMPLANTABLE CARDIOVERTER DEFIBRILLATOR  2015    [facility] [employer] #[id number]     Allergies   Allergen Reactions    Other Medication Hives     Allergic, to dust mites, molds    Pollen Extracts Hives      Family History   Problem Relation Age of Onset    Hypertension Mother     Hypertension Father     Diabetes Maternal Grandmother     Heart Disease Maternal Grandmother     Arthritis-osteo Maternal Grandfather     Asthma Brother     Arthritis-osteo Paternal Grandmother     Arthritis-osteo Paternal Grandfather       Social History     Socioeconomic History    Marital status:      Spouse name: Not on file    Number of children: 0    Years of education: Not on file    Highest education level: Not on file   Occupational History    Occupation: halfway analyist     Employer: UNKNOWN   Social Needs    Financial resource strain: Not on file    Food insecurity:     Worry: Not on file     Inability: Not on file    Transportation needs:     Medical: Not on file     Non-medical: Not on file   Tobacco Use    Smoking status: Never Smoker    Smokeless tobacco: Never Used   Substance and Sexual Activity    Alcohol use: No    Drug use: No     Comment: denies     Sexual activity: Yes     Partners: Male     Birth control/protection: Pill   Lifestyle    Physical activity:     Days per week: Not on file     Minutes per session: Not on file    Stress: Not on file   Relationships    Social connections:     Talks on phone: Not on file     Gets together: Not on file     Attends Druze service: Not on file     Active member of club or organization: Not on file     Attends meetings of clubs or organizations: Not on file     Relationship status: Not on file    Intimate partner violence:     Fear of current or ex partner: Not on file     Emotionally abused: Not on file     Physically abused: Not on file     Forced sexual activity: Not on file   Other Topics Concern    Not on file   Social History Narrative    ** Merged History Encounter **           Current Outpatient Medications   Medication Sig    valACYclovir (VALTREX) 1 gram tablet     carvedilol (COREG) 25 mg tablet TAKE 1 TABLET BY MOUTH TWICE A DAY WITH MEALS    valsartan (DIOVAN) 80 mg tablet TAKE 1 TAB BY MOUTH TWO (2) TIMES A DAY. INDICATIONS: CHRONIC HEART FAILURE    chlorthalidone (HYGROTEN) 25 mg tablet Take 0.5 Tabs by mouth daily as needed. Indications: Edema (Patient taking differently: Take  by mouth daily as needed.)    COQ10, UBIQUINOL, PO Take 100 mg by mouth two (2) times a day.  CHOLECALCIFEROL, VITAMIN D3, (VITAMIN D3 PO) Take  by mouth daily.  levocetirizine (XYZAL) 5 mg tablet Take 1 Tab by mouth daily.  fluticasone (FLONASE) 50 mcg/actuation nasal spray 2 Sprays by Both Nostrils route daily. No current facility-administered medications for this visit. I have reviewed the nurses notes, vitals, problem list, allergy list, medical history, family medical, social history and medications.         Objective:     Physical Exam:     Vitals:    05/07/19 0936 05/07/19 0942   BP: 105/69 106/71   Pulse: 90    Resp: 18    SpO2: 98%    Weight: 167 lb (75.8 kg)    Height: 5' 4\" (1.626 m)     Body mass index is 28.67 kg/m². General: Well developed, in no acute distress. HEENT: No carotid bruits, no JVD, trach is midline. Heart:  Normal S1/S2 negative S3 or S4. Regular, no murmur, gallop or rub.   Respiratory: Clear bilaterally, no wheezing or rales  Abdomen:   Soft, non-tender, bowel sounds are active.   Extremities:  No edema, normal cap refill, no cyanosis. Neuro: A&Ox3, speech clear, gait stable. Skin: Skin color is normal. No rashes or lesions. No diaphoresis. Vascular: 2+ pulses symmetric in all extremities        Data Review:       Cardiographics:    EKG: NSR,minor non-specific ST-T wave changes    Cardiology Labs:    Results for orders placed or performed during the hospital encounter of 09/16/15   EKG, 12 LEAD, INITIAL   Result Value Ref Range    Ventricular Rate 73 BPM    Atrial Rate 73 BPM    P-R Interval 138 ms    QRS Duration 82 ms    Q-T Interval 448 ms    QTC Calculation (Bezet) 493 ms    Calculated P Axis 51 degrees    Calculated R Axis 44 degrees    Calculated T Axis 2 degrees    Diagnosis       Normal sinus rhythm  Normal ekg  Confirmed by Trish Martinez ([id number]) on 9/17/2015 7:29:30 AM         Lab Results   Component Value Date/Time    Cholesterol, total 139 06/02/2016 08:19 AM    HDL Cholesterol 45 06/02/2016 08:19 AM    LDL, calculated 76 06/02/2016 08:19 AM    Triglyceride 89 06/02/2016 08:19 AM       Lab Results   Component Value Date/Time    Sodium 139 09/20/2018 12:00 AM    Potassium 3.9 09/20/2018 12:00 AM    Chloride 100 09/20/2018 12:00 AM    CO2 23 09/20/2018 12:00 AM    Anion gap 9 09/16/2015 08:29 PM    Glucose 78 09/20/2018 12:00 AM    BUN 10 09/20/2018 12:00 AM    Creatinine 0.99 09/20/2018 12:00 AM    BUN/Creatinine ratio 10 09/20/2018 12:00 AM    GFR est AA 88 09/20/2018 12:00 AM    GFR est non-AA 76 09/20/2018 12:00 AM    Calcium 9.0 09/20/2018 12:00 AM    Bilirubin, total 0.3 09/16/2015 08:29 PM    AST (SGOT) 15 09/16/2015 08:29 PM    Alk.  phosphatase 57 09/16/2015 08:29 PM Protein, total 7.9 09/16/2015 08:29 PM    Albumin 3.5 09/16/2015 08:29 PM    Globulin 4.4 (H) 09/16/2015 08:29 PM    A-G Ratio 0.8 (L) 09/16/2015 08:29 PM    ALT (SGPT) 18 09/16/2015 08:29 PM          Assessment:       ICD-10-CM ICD-9-CM    1. Systolic CHF, chronic (HCC) I50.22 428.22      428.0    2. Cardiomyopathy, peripartum, postpartum--resolved on Rx. O90.3 674.54    3. ICD (implantable cardioverter-defibrillator) in place Z95.810 V45.02    4. Essential hypertension I10 401.9          Discussion: Patient presents at this time stable from a cardiac perspective. HR in 90's. Recently treated for Shingles. Will repeat echo to reassess LVEF with hx of cardiomyopathy. Pleased with present cardiac status. Plan: 1. Continue same meds. Lipid profile and labs followed by PCP--perfect in 2016    2. Encouraged to exercise to tolerance and follow low fat, low cholesterol, low sodium predominantly Plant-based (consider Mediterranean) diet. Call with questions or concerns. Will follow up any test results by phone and/or f/u here in office if needed. Fabio Navarro 3.Follow up: 6 months    I have discussed the diagnosis with the patient and the intended plan as seen in the above orders. The patient has received an after-visit summary and questions were answered concerning future plans. I have discussed any concerning medication side effects and warnings with the patient as well.     Boy Barrios MD  5/7/2019

## 2019-05-17 ENCOUNTER — CLINICAL SUPPORT (OUTPATIENT)
Dept: CARDIOLOGY CLINIC | Age: 34
End: 2019-05-17

## 2019-05-17 DIAGNOSIS — I50.22 SYSTOLIC CHF, CHRONIC (HCC): ICD-10-CM

## 2019-05-17 DIAGNOSIS — R06.00 DYSPNEA, UNSPECIFIED TYPE: ICD-10-CM

## 2019-05-17 DIAGNOSIS — Z95.810 ICD (IMPLANTABLE CARDIOVERTER-DEFIBRILLATOR) IN PLACE: Primary | ICD-10-CM

## 2019-05-31 DIAGNOSIS — R06.83 SNORING: ICD-10-CM

## 2019-05-31 DIAGNOSIS — I10 ESSENTIAL HYPERTENSION: ICD-10-CM

## 2019-05-31 DIAGNOSIS — I50.22 SYSTOLIC CHF, CHRONIC (HCC): ICD-10-CM

## 2019-05-31 DIAGNOSIS — Z95.810 ICD (IMPLANTABLE CARDIOVERTER-DEFIBRILLATOR) IN PLACE: ICD-10-CM

## 2019-06-01 RX ORDER — VALSARTAN 80 MG/1
80 TABLET ORAL 2 TIMES DAILY
Qty: 180 TAB | Refills: 1 | Status: SHIPPED | OUTPATIENT
Start: 2019-06-01 | End: 2019-12-09 | Stop reason: SDUPTHER

## 2019-07-01 ENCOUNTER — HOSPITAL ENCOUNTER (OUTPATIENT)
Dept: NON INVASIVE DIAGNOSTICS | Age: 34
Discharge: HOME | End: 2019-07-01
Attending: INTERNAL MEDICINE
Payer: COMMERCIAL

## 2019-07-01 ENCOUNTER — APPOINTMENT (OUTPATIENT)
Dept: NON INVASIVE DIAGNOSTICS | Age: 34
End: 2019-07-01
Attending: INTERNAL MEDICINE

## 2019-07-01 DIAGNOSIS — I50.22 SYSTOLIC CHF, CHRONIC (HCC): ICD-10-CM

## 2019-07-01 DIAGNOSIS — I10 ESSENTIAL HYPERTENSION: ICD-10-CM

## 2019-07-01 DIAGNOSIS — Z95.810 ICD (IMPLANTABLE CARDIOVERTER-DEFIBRILLATOR) IN PLACE: ICD-10-CM

## 2019-07-01 LAB
ECHO AO ROOT DIAM: 2.52 CM
ECHO AV AREA PLAN: 3 CM2
ECHO EST RA PRESSURE: 5 MMHG
ECHO LA AREA 4C: 17.2 CM2
ECHO LA MAJOR AXIS: 3.07 CM
ECHO LA TO AORTIC ROOT RATIO: 1.22
ECHO LA VOL 4C: 41.41 ML (ref 22–52)
ECHO LV EDV A4C: 122.3 ML
ECHO LV EJECTION FRACTION A4C: 55 %
ECHO LV ESV A4C: 54.6 ML
ECHO LV INTERNAL DIMENSION DIASTOLIC: 5.18 CM (ref 3.9–5.3)
ECHO LV INTERNAL DIMENSION SYSTOLIC: 3.3 CM
ECHO LV IVSD: 0.76 CM (ref 0.6–0.9)
ECHO LV MASS 2D: 186.8 G (ref 67–162)
ECHO LV POSTERIOR WALL DIASTOLIC: 0.98 CM (ref 0.6–0.9)
ECHO LVOT DIAM: 2.17 CM
ECHO LVOT PEAK GRADIENT: 2.6 MMHG
ECHO LVOT PEAK VELOCITY: 80.52 CM/S
ECHO MV A VELOCITY: 36.94 CM/S
ECHO MV AREA PHT: 5.9 CM2
ECHO MV AREA PLAN: 5.6 CM2
ECHO MV E DECELERATION TIME (DT): 113 MS
ECHO MV E VELOCITY: 33.05 CM/S
ECHO MV E/A RATIO: 0.89
ECHO MV PRESSURE HALF TIME (PHT): 37.6 MS
ECHO MV REGURGITANT PEAK GRADIENT: 0.7 MMHG
ECHO MV REGURGITANT PEAK VELOCITY: 42.83 CM/S
ECHO MV REGURGITANT VTIA: 7.68 CM
ECHO PULMONARY ARTERY SYSTOLIC PRESSURE (PASP): 22.6 MMHG
ECHO PV MAX VELOCITY: 62.43 CM/S
ECHO PV PEAK GRADIENT: 1.6 MMHG
ECHO RA AREA 4C: 12.22 CM2
ECHO RIGHT VENTRICULAR SYSTOLIC PRESSURE (RVSP): 22.6 MMHG
ECHO TV REGURGITANT MAX VELOCITY: 209.49 CM/S
ECHO TV REGURGITANT PEAK GRADIENT: 17.6 MMHG

## 2019-07-01 PROCEDURE — 93306 TTE W/DOPPLER COMPLETE: CPT

## 2019-07-03 ENCOUNTER — TELEPHONE (OUTPATIENT)
Dept: CARDIOLOGY CLINIC | Age: 34
End: 2019-07-03

## 2019-07-03 NOTE — TELEPHONE ENCOUNTER
----- Message from Danika Tineo MD sent at 7/1/2019  9:44 PM EDT -----  Regarding: ECHO   Narayanan Adrienne!!! Heart function normal    B  ----- Message -----  From: Crys Adair MD  Sent: 7/1/2019   5:02 PM  To:  Danika Tineo MD

## 2019-07-03 NOTE — TELEPHONE ENCOUNTER
I called and informed the patient, Dr. Jania Reynolds wanted me to call and let you know that the Echo was normal. She acknowledged understanding and thanked me for the call.

## 2019-08-16 ENCOUNTER — OFFICE VISIT (OUTPATIENT)
Dept: CARDIOLOGY CLINIC | Age: 34
End: 2019-08-16

## 2019-08-16 DIAGNOSIS — Z95.810 ICD (IMPLANTABLE CARDIOVERTER-DEFIBRILLATOR) IN PLACE: Primary | ICD-10-CM

## 2019-09-01 DIAGNOSIS — I10 ESSENTIAL HYPERTENSION: ICD-10-CM

## 2019-09-01 DIAGNOSIS — Z95.810 ICD (IMPLANTABLE CARDIOVERTER-DEFIBRILLATOR) IN PLACE: ICD-10-CM

## 2019-09-02 RX ORDER — CARVEDILOL 25 MG/1
TABLET ORAL
Qty: 60 TAB | Refills: 5 | Status: SHIPPED | OUTPATIENT
Start: 2019-09-02 | End: 2020-03-23

## 2019-10-14 ENCOUNTER — OFFICE VISIT (OUTPATIENT)
Dept: CARDIOLOGY CLINIC | Age: 34
End: 2019-10-14

## 2019-10-14 DIAGNOSIS — Z95.810 ICD (IMPLANTABLE CARDIOVERTER-DEFIBRILLATOR) IN PLACE: Primary | ICD-10-CM

## 2019-11-13 ENCOUNTER — OFFICE VISIT (OUTPATIENT)
Dept: CARDIOLOGY CLINIC | Age: 34
End: 2019-11-13

## 2019-11-13 VITALS
BODY MASS INDEX: 29.91 KG/M2 | OXYGEN SATURATION: 94 % | RESPIRATION RATE: 18 BRPM | SYSTOLIC BLOOD PRESSURE: 112 MMHG | HEART RATE: 85 BPM | WEIGHT: 175.2 LBS | DIASTOLIC BLOOD PRESSURE: 85 MMHG | HEIGHT: 64 IN

## 2019-11-13 DIAGNOSIS — I10 ESSENTIAL HYPERTENSION: ICD-10-CM

## 2019-11-13 DIAGNOSIS — I50.22 SYSTOLIC CHF, CHRONIC (HCC): Primary | ICD-10-CM

## 2019-11-13 NOTE — PROGRESS NOTES
Chief Complaint   Patient presents with    Hypertension     6 month follow up     1. Have you been to the ER, urgent care clinic since your last visit? Hospitalized since your last visit? No    2. Have you seen or consulted any other health care providers outside of the [address] [place] [address] since your last visit? Include any pap smears or colon screening.  No

## 2019-11-13 NOTE — PROGRESS NOTES
[facility]   [address] [facility], [facility] [address]                                          NEW PATIENT HPI/FOLLOW-UP      NAME:  Arvind Liptheresa   :   1985   MRN:   [id number]   PCP:  Yumiko Henry MD           Subjective: The patient is a 36y.o. year old female with h/o HTN, cardiomyopathy, systolic CHF (with improved EF 60-65% 2019) who returns for a routine follow-up. Since the last visit, patient reports no new symptoms. Denies change in exercise tolerance, chest pain, edema, medication intolerance, palpitations, shortness of breath, PND/orthopnea wheezing, sputum, syncope, dizziness or light headedness. Doing satisfactorily. Review of Systems  General: Pt denies excessive weight gain or loss. Pt is able to conduct ADL's. Respiratory: Denies shortness of breath, MOULTON, wheezing or stridor.   Cardiovascular: Denies precordial pain, palpitations, edema or PND  Gastrointestinal: Denies poor appetite, indigestion, abdominal pain or blood in stool  Peripheral vascular: Denies claudication, leg cramps  Neuropsychiatric: Denies paresthesias,tingling,numbness,anxiety,depression,fatigue  Musculoskeletal: Denies pain,tenderness, soreness,swelling      Past Medical History:   Diagnosis Date    AICD (automatic cardioverter/defibrillator) present     Anemia NEC     Cardiomyopathy, peripartum, postpartum 2/10/2015    ECHO 2013: EF 50%, mild MR/TR  ECHO 2015: EF 30-35% ECHO 2015:LVD, EF 25-30%, [address] ECHO 2016: EF 50% ECHO 2017: EF 40-45%, mild DHK, LVH, RV mild dilated ECHO 2018: EF 50%, no WMA, wall thickness < no (.8-.9 cm), mil MR/TR,   CARDIAC MRI 2016: LVEF 50%, mild GHK, RVEF 60%   AICD : Biotronik     Class 1 obesity due to excess calories without serious comorbidity with body mass index (BMI) of 30.0 to 30.9 in adult 2018    Contact dermatitis and other eczema, due to unspecified cause     hives    Hypertension     Other fatigue 2018    Systolic congestive heart failure with reduced left ventricular function, NYHA class 2 ([facility] 3/29/2018     Patient Active Problem List    Diagnosis Date Noted    Class 1 obesity due to excess calories without serious comorbidity with body mass index (BMI) of 30.0 to 30.9 in adult 2018    Other fatigue     Systolic CHF, chronic ([facility] [address]     Systolic congestive heart failure with reduced left ventricular function, NYHA class 2 ([facility] [address] 2018    HTN (hypertension) 2015    ICD (implantable cardioverter-defibrillator) in place 2015    Cardiomyopathy, peripartum, postpartum--resolved on Rx. 02/10/2015    Vitamin D deficiency 2011    Snoring 2011      Past Surgical History:   Procedure Laterality Date    HX GYN  13        HX IMPLANTABLE CARDIOVERTER DEFIBRILLATOR  2015    [facility] #[id number]     Allergies   Allergen Reactions    Other Medication Hives     Allergic, to dust mites, molds    Pollen Extracts Hives      Family History   Problem Relation Age of Onset    Hypertension Mother     Hypertension Father     Diabetes Maternal Grandmother     Heart Disease Maternal Grandmother     Arthritis-osteo Maternal Grandfather     Asthma Brother     Arthritis-osteo Paternal Grandmother     Arthritis-osteo Paternal Grandfather       Social History     Socioeconomic History    Marital status:      Spouse name: Not on file    Number of children: 0    Years of education: Not on file    Highest education level: Not on file   Occupational History    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     Employer: UNKNOWN   Social Needs    Financial resource strain: Not on file    Food insecurity:     Worry: Not on file     Inability: Not on file    Transportation needs:     Medical: Not on file     Non-medical: Not on file   Tobacco Use    Smoking status: Never Smoker    Smokeless tobacco: Never Used   Substance and Sexual Activity    Alcohol use: No    Drug use: No     Comment: denies     Sexual activity: Yes     Partners: Male     Birth control/protection: Pill   Lifestyle    Physical activity:     Days per week: Not on file     Minutes per session: Not on file    Stress: Not on file   Relationships    Social connections:     Talks on phone: Not on file     Gets together: Not on file     Attends Confucianism service: Not on file     Active member of club or organization: Not on file     Attends meetings of clubs or organizations: Not on file     Relationship status: Not on file    Intimate partner violence:     Fear of current or ex partner: Not on file     Emotionally abused: Not on file     Physically abused: Not on file     Forced sexual activity: Not on file   Other Topics Concern    Not on file   Social History Narrative    ** Merged History Encounter **           Current Outpatient Medications   Medication Sig    carvedilol (COREG) 25 mg tablet TAKE 1 TABLET BY MOUTH TWICE A DAY WITH MEALS    valsartan (DIOVAN) 80 mg tablet TAKE 1 TAB BY MOUTH TWO (2) TIMES A DAY. INDICATIONS: CHRONIC HEART FAILURE    chlorthalidone (HYGROTEN) 25 mg tablet Take 0.5 Tabs by mouth daily as needed. Indications: Edema (Patient taking differently: Take  by mouth daily as needed.)    COQ10, UBIQUINOL, PO Take 100 mg by mouth two (2) times a day.  CHOLECALCIFEROL, VITAMIN D3, (VITAMIN D3 PO) Take  by mouth daily.  levocetirizine (XYZAL) 5 mg tablet Take 1 Tab by mouth daily.  fluticasone (FLONASE) 50 mcg/actuation nasal spray 2 Sprays by Both Nostrils route daily.  valACYclovir (VALTREX) 1 gram tablet      No current facility-administered medications for this visit. I have reviewed the nurses notes, vitals, problem list, allergy list, medical history, family medical, social history and medications.         Objective:     Physical Exam:     Vitals:    11/13/19 0923 11/13/19 0931   BP: 110/80 110/80   Pulse: 85    Resp: 18    SpO2: 94%    Weight: 175 lb 3.2 oz (79.5 kg)    Height: 5' 4\" (1.626 m)     Body mass index is 30.07 kg/m². General: WDWN adult female, in no acute distress. Pleasant affect. HEENT: No carotid bruits, no JVD, trach is midline. Heart:  Normal S1/S2 negative S3 or S4. RRR, no murmur, gallop or rub.   Respiratory: Clear bilaterally, no wheezing or rales  Abdomen:   Soft, non-tender, bowel sounds are active.   Extremities:  No edema, normal cap refill, no cyanosis. Neuro: A&Ox3, speech clear, gait stable. Skin: Skin color is normal. No rashes or lesions. No diaphoresis. Vascular: 2+ pulses symmetric in all extremities        Data Review:       Cardiographics:    EKG interpretation:  Rhythm: normal sinus rhythm; and regular . Rate (approx.): 75; Axis: normal; P wave: normal; QRS interval: normal ; ST/T wave: T wave inversion/flattening in V3-V5. This EKG was interpreted by Newton Laboy PA-C.         Cardiology Labs:    Results for orders placed or performed during the hospital encounter of 09/16/15   EKG, 12 LEAD, INITIAL   Result Value Ref Range    Ventricular Rate 73 BPM    Atrial Rate 73 BPM    P-R Interval 138 ms    QRS Duration 82 ms    Q-T Interval 448 ms    QTC Calculation (Bezet) 493 ms    Calculated P Axis 51 degrees    Calculated R Axis 44 degrees    Calculated T Axis 2 degrees    Diagnosis       Normal sinus rhythm  Normal ekg  Confirmed by Shelly Phelan ([id number]) on 9/17/2015 7:29:30 AM         Lab Results   Component Value Date/Time    Cholesterol, total 139 06/02/2016 08:19 AM    HDL Cholesterol 45 06/02/2016 08:19 AM    LDL, calculated 76 06/02/2016 08:19 AM    Triglyceride 89 06/02/2016 08:19 AM       Lab Results   Component Value Date/Time    Sodium 139 09/20/2018 12:00 AM    Potassium 3.9 09/20/2018 12:00 AM    Chloride 100 09/20/2018 12:00 AM    CO2 23 09/20/2018 12:00 AM    Anion gap 9 09/16/2015 08:29 PM    Glucose 78 09/20/2018 12:00 AM    BUN 10 09/20/2018 12:00 AM    Creatinine 0.99 09/20/2018 12:00 AM BUN/Creatinine ratio 10 09/20/2018 12:00 AM    GFR est AA 88 09/20/2018 12:00 AM    GFR est non-AA 76 09/20/2018 12:00 AM    Calcium 9.0 09/20/2018 12:00 AM    Bilirubin, total 0.3 09/16/2015 08:29 PM    AST (SGOT) 15 09/16/2015 08:29 PM    Alk. phosphatase 57 09/16/2015 08:29 PM    Protein, total 7.9 09/16/2015 08:29 PM    Albumin 3.5 09/16/2015 08:29 PM    Globulin 4.4 (H) 09/16/2015 08:29 PM    A-G Ratio 0.8 (L) 09/16/2015 08:29 PM    ALT (SGPT) 18 09/16/2015 08:29 PM          Assessment:       ICD-10-CM ICD-9-CM    1. Hypertension, unspecified type I10 401.9 AMB POC EKG ROUTINE W/ 12 LEADS, INTER & REP   2. Cardiomyopathy, peripartum, postpartum--resolved on Rx. O90.3 674.54    3. Systolic CHF, chronic (HCC) I50.22 428.22      428.0     Improved; EF 60-65% on echo 7/2019         Discussion: Patient presents at this time stable from a cardiac perspective. BP was well controlled. EF improved on current meds. Pt encouraged to continue. Pleased with present status. Discussed/seen with Dr. Ben Boss: 1. Continue same meds. 2.Encouraged to exercise to tolerance, follow low fat, low cholesterol, low sodium predominantly Plant-based (consider Mediterranean) diet. 3.Follow up: 6 months   --  Call with questions or concerns. -- Will follow up any test results by phone and/or f/u here in office if needed. .        I have discussed the diagnosis with the patient and the intended plan as seen in the above orders. The patient has received an after-visit summary and questions were answered concerning future plans. I have discussed any concerning medication side effects and warnings with the patient as well. Tulio Spann PA-C  11/13/2019  . Patient seen and examined. All pertinent data reviewed. I have reviewed detailed note as outlined by Sandrita Devlin. Case discussed with Nursing/medical assistant staff and Sandrita Devlin. Patient cardiac stable. [facility] compensated. Continue same. Plans as outlined. Segundo Patricia

## 2019-11-13 NOTE — PATIENT INSTRUCTIONS
Your heart's function looks excellent. Please continue the same medications.     6 months' follow up

## 2019-11-15 ENCOUNTER — OFFICE VISIT (OUTPATIENT)
Dept: CARDIOLOGY CLINIC | Age: 34
End: 2019-11-15

## 2019-11-15 DIAGNOSIS — Z95.810 ICD (IMPLANTABLE CARDIOVERTER-DEFIBRILLATOR) IN PLACE: Primary | ICD-10-CM

## 2019-12-09 DIAGNOSIS — R06.83 SNORING: ICD-10-CM

## 2019-12-09 DIAGNOSIS — I10 ESSENTIAL HYPERTENSION: ICD-10-CM

## 2019-12-09 DIAGNOSIS — Z95.810 ICD (IMPLANTABLE CARDIOVERTER-DEFIBRILLATOR) IN PLACE: ICD-10-CM

## 2019-12-09 DIAGNOSIS — I50.22 SYSTOLIC CHF, CHRONIC (HCC): ICD-10-CM

## 2019-12-09 RX ORDER — VALSARTAN 80 MG/1
80 TABLET ORAL 2 TIMES DAILY
Qty: 60 TAB | Refills: 5 | Status: SHIPPED | OUTPATIENT
Start: 2019-12-09 | End: 2020-07-07

## 2020-01-01 NOTE — MR AVS SNAPSHOT
[address] [facility] 
[phone] Patient: Haily Oviedo MRN: [id number] Z:1/83/3642 Visit Information Date & Time Provider Department Dept. Phone Encounter #  
 8/20/2018  3:00 PM Hal Vidal, [facility] Internal Medicine [phone] [id number] Follow-up Instructions Return in about 3 months (around 11/20/2018) for follow up, bp check. Your Appointments 9/20/2018 12:30 PM  
Follow Up with WILMER Calvo [address] [facility], [facility] ([facility]) [facility] [address]  
[phone]  
  
   
 [address]  
  
    
 11/6/2018  9:00 AM  
ESTABLISHED PATIENT with Emili Flanagan MD  
[facility]) Appt Note: 6 MO. F/U  
 [address] [place] [address]  
[phone] [address]  
  
    
 11/16/2018  8:00 AM  
PROCEDURE with [facility]) Appt Note: not an office visit, bio icd (send letter for annual) [address]  
[phone] [address]  
  
    
 11/21/2018  3:45 PM  
ROUTINE CARE with aHl Vidal MD  
[address] [facility] Internal Medicine [facility]) Appt Note: follow up rmw 8.20.18 $0CP  
 [facility] Upcoming Health Maintenance Date Due  
 PAP AKA CERVICAL CYTOLOGY 9/24/2007 Influenza Age 5 to Adult 8/1/2018 DTaP/Tdap/Td series (3 - Td) 5/25/2026 Allergies as of 8/20/2018  Review Complete On: 8/20/2018 By: Kaia Gould LPN Severity Noted Reaction Type Reactions Other Medication  01/24/2013   Side Effect Hives Allergic, to dust mites, molds Pollen Extracts  05/12/2015    Hives Current Immunizations  Reviewed on 5/31/2016 Name Date Influenza Vaccine 10/15/2015,  Deferred (Patient Refused) Influenza Vaccine Intradermal PF 1/16/2017 Pneumococcal Polysaccharide (PPSV-23) 10/15/2015 TDAP Vaccine 8/20/2011 Tdap 5/25/2016  9:21 PM  
  
 Not reviewed this visit You Were Diagnosed With   
  
 Codes Comments Systolic congestive heart failure with reduced left ventricular function, NYHA class 2 ([facility] [address]    -  Primary ICD-10-CM: I50.20 ICD-9-CM: 428.20, 428.0 Cardiomyopathy, peripartum, postpartum     ICD-10-CM: O90.3 ICD-9-CM: 674.54 Snoring     ICD-10-CM: R06.83 
ICD-9-CM: 786.09 Vitamin D deficiency     ICD-10-CM: E55.9 ICD-9-CM: 268.9 ICD (implantable cardioverter-defibrillator) in place     ICD-10-CM: Z95.810 ICD-9-CM: V45.02 Eczema, unspecified type     ICD-10-CM: L30.9 ICD-9-CM: 692.9 Vitals BP Pulse Temp Resp Height(growth percentile) Weight(growth percentile) 104/70 87 98.9 °F (37.2 °C) (Oral) 18 5' 4\" (1.626 m) 179 lb (81.2 kg) LMP BMI OB Status Smoking Status 08/20/2018 (Exact Date) 30.73 kg/m2 Having regular periods Never Smoker Vitals History BMI and BSA Data Body Mass Index Body Surface Area 30.73 kg/m 2 1.91 m 2 Preferred Pharmacy Pharmacy Name Phone [facility][id number]- Adriano FERNA[address] Your Updated Medication List  
  
   
This list is accurate as of 8/20/18  4:03 PM.  Always use your most recent med list.  
  
  
  
  
 carvedilol 25 mg tablet Commonly known as:  COREG  
TAKE 1 TABLET BY MOUTH TWICE DAILY WITH MEALS  
  
 chlorthalidone 25 mg tablet Commonly known as:  Hipolito [place] Take 0.5 Tabs by mouth daily as needed. Indications: Edema COQ10 (UBIQUINOL) PO Take 100 mg by mouth two (2) times a day. fluticasone 50 mcg/actuation nasal spray Commonly known as:  Cheryl Kimble 2 Sprays by Both Nostrils route daily. levocetirizine 5 mg tablet Commonly known as:  Danika Grumbles Take 1 Tab by mouth daily. valsartan 80 mg tablet Commonly known as:  DIOVAN Take 1 Tab by mouth two (2) times a day. Indications: chronic heart failure VITAMIN D3 PO Take  by mouth daily. Follow-up Instructions Return in about 3 months (around 11/20/2018) for follow up, bp check. Introducing [facility]! Melvin Alamo introduces Way[employer] patient portal. Now you can access parts of your medical record, email your doctor's office, and request medication refills online. 1. In your internet browser, go to [email or web address] 2. Click on the First Time User? Click Here link in the Sign In box. You will see the New Member Sign Up page. 3. Enter your Waygo Access Code exactly as it appears below. You will not need to use this code after youve completed the sign-up process. If you do not sign up before the expiration date, you must request a new code. · Waygo Access Code: [email or web address] Expires: 11/15/2018  4:19 PM 
 
4. Enter the last four digits of your Social Security Number (xxxx) and Date of Birth (mm/dd/yyyy) as indicated and click Submit. You will be taken to the next sign-up page. 5. Create a Waygo ID. This will be your Waygo login ID and cannot be changed, so think of one that is secure and easy to remember. 6. Create a Waygo password. You can change your password at any time. 7. Enter your Password Reset Question and Answer. This can be used at a later time if you forget your password. 8. Enter your e-mail address. You will receive e-mail notification when new information is available in [address] 9. Click Sign Up. You can now view and download portions of your medical record. 10. Click the Download Summary menu link to download a portable copy of your medical information. If you have questions, please visit the Frequently Asked Questions section of the [employer] website. Remember, [employer] is NOT to be used for urgent needs. For medical emergencies, dial 911. Now available from your iPhone and Android! Please provide this summary of care documentation to your next provider. Your primary care clinician is listed as NONE. If you have any questions after today's visit, please call [phone]. continue with med.

## 2020-02-14 ENCOUNTER — OFFICE VISIT (OUTPATIENT)
Dept: CARDIOLOGY CLINIC | Age: 35
End: 2020-02-14

## 2020-02-14 DIAGNOSIS — I50.20 SYSTOLIC CONGESTIVE HEART FAILURE WITH REDUCED LEFT VENTRICULAR FUNCTION, NYHA CLASS 2 (HCC): ICD-10-CM

## 2020-02-14 DIAGNOSIS — Z95.810 ICD (IMPLANTABLE CARDIOVERTER-DEFIBRILLATOR) IN PLACE: Primary | ICD-10-CM

## 2020-03-23 DIAGNOSIS — Z95.810 ICD (IMPLANTABLE CARDIOVERTER-DEFIBRILLATOR) IN PLACE: ICD-10-CM

## 2020-03-23 DIAGNOSIS — I10 ESSENTIAL HYPERTENSION: ICD-10-CM

## 2020-03-23 RX ORDER — CARVEDILOL 25 MG/1
TABLET ORAL
Qty: 60 TAB | Refills: 5 | Status: SHIPPED | OUTPATIENT
Start: 2020-03-23 | End: 2020-09-02

## 2020-04-03 ENCOUNTER — OFFICE VISIT (OUTPATIENT)
Dept: CARDIOLOGY CLINIC | Age: 35
End: 2020-04-03

## 2020-04-03 ENCOUNTER — CLINICAL SUPPORT (OUTPATIENT)
Dept: CARDIOLOGY CLINIC | Age: 35
End: 2020-04-03

## 2020-04-03 VITALS
SYSTOLIC BLOOD PRESSURE: 120 MMHG | DIASTOLIC BLOOD PRESSURE: 82 MMHG | WEIGHT: 180 LBS | OXYGEN SATURATION: 98 % | RESPIRATION RATE: 18 BRPM | HEIGHT: 64 IN | HEART RATE: 102 BPM | BODY MASS INDEX: 30.73 KG/M2

## 2020-04-03 DIAGNOSIS — Z95.810 ICD (IMPLANTABLE CARDIOVERTER-DEFIBRILLATOR) IN PLACE: Primary | ICD-10-CM

## 2020-04-03 DIAGNOSIS — Z95.810 ICD (IMPLANTABLE CARDIOVERTER-DEFIBRILLATOR) IN PLACE: ICD-10-CM

## 2020-04-03 DIAGNOSIS — I10 ESSENTIAL HYPERTENSION: ICD-10-CM

## 2020-04-03 DIAGNOSIS — E66.09 CLASS 1 OBESITY DUE TO EXCESS CALORIES WITHOUT SERIOUS COMORBIDITY WITH BODY MASS INDEX (BMI) OF 30.0 TO 30.9 IN ADULT: ICD-10-CM

## 2020-04-03 NOTE — PROGRESS NOTES
Subjective:      Carito Barrios is a 36 y.o. female is here for long EP follow up and device check (last seen ). The patient denies chest pain/ shortness of breath, orthopnea, PND, LE edema, palpitations, syncope, presyncope or fatigue. Questions if she will need another ICD once this reaches the end of the battery.      Patient Active Problem List    Diagnosis Date Noted    Class 1 obesity due to excess calories without serious comorbidity with body mass index (BMI) of 30.0 to 30.9 in adult 2018    Other fatigue     Systolic CHF, chronic ([address]     Systolic congestive heart failure with reduced left ventricular function, NYHA class 2 ([address] 2018    HTN (hypertension) 2015    ICD (implantable cardioverter-defibrillator) in place 2015    Cardiomyopathy, peripartum, postpartum--resolved on Rx. 02/10/2015    Vitamin D deficiency 2011    Snoring 2011      Dama Prader, MD  Past Medical History:   Diagnosis Date    AICD (automatic cardioverter/defibrillator) present     Anemia NEC     Cardiomyopathy, peripartum, postpartum 2/10/2015    ECHO 2013: EF 50%, mild MR/TR  ECHO 2015: EF 30-35% ECHO 2015:LVD, EF 25-30%, [address] ECHO 2016: EF 50% ECHO 2017: EF 40-45%, mild DHK, LVH, RV mild dilated ECHO 2018: EF 50%, no WMA, wall thickness < no (.8-.9 cm), mil MR/TR,   CARDIAC MRI 2016: LVEF 50%, mild GHK, RVEF 60%   AICD : Biotronik     Class 1 obesity due to excess calories without serious comorbidity with body mass index (BMI) of 30.0 to 30.9 in adult 2018    Contact dermatitis and other eczema, due to unspecified cause     hives    Hypertension     Other fatigue     Systolic congestive heart failure with reduced left ventricular function, NYHA class 2 ([address] 3/29/2018      Past Surgical History:   Procedure Laterality Date    HX GYN  13        HX IMPLANTABLE CARDIOVERTER DEFIBRILLATOR  2015 [employer] #[id number]     Allergies   Allergen Reactions    Other Medication Hives     Allergic, to dust mites, molds    Pollen Extracts Hives      Family History   Problem Relation Age of Onset    Hypertension Mother     Hypertension Father     Diabetes Maternal Grandmother     Heart Disease Maternal Grandmother     Arthritis-osteo Maternal Grandfather     Asthma Brother     Arthritis-osteo Paternal Grandmother     Arthritis-osteo Paternal Grandfather     negative for cardiac disease  Social History     Socioeconomic History    Marital status:      Spouse name: Not on file    Number of children: 0    Years of education: Not on file    Highest education level: Not on file   Occupational History    Occupation: [employer]     Employer: UNKNOWN   Tobacco Use    Smoking status: Never Smoker    Smokeless tobacco: Never Used   Substance and Sexual Activity    Alcohol use: No    Drug use: No     Comment: denies     Sexual activity: Yes     Partners: Male     Birth control/protection: Pill   Social History Narrative    ** Merged History Encounter **          Current Outpatient Medications   Medication Sig    carvediloL (COREG) 25 mg tablet TAKE 1 TABLET BY MOUTH TWICE A DAY WITH MEALS    valsartan (DIOVAN) 80 mg tablet TAKE 1 TAB BY MOUTH TWO (2) TIMES A DAY. INDICATIONS: CHRONIC HEART FAILURE    chlorthalidone (HYGROTEN) 25 mg tablet Take 0.5 Tabs by mouth daily as needed. Indications: Edema (Patient taking differently: Take  by mouth daily as needed.)    COQ10, UBIQUINOL, PO Take 100 mg by mouth two (2) times a day.  CHOLECALCIFEROL, VITAMIN D3, (VITAMIN D3 PO) Take  by mouth daily.  levocetirizine (XYZAL) 5 mg tablet Take 1 Tab by mouth daily.  fluticasone (FLONASE) 50 mcg/actuation nasal spray 2 Sprays by Both Nostrils route daily.  valACYclovir (VALTREX) 1 gram tablet      No current facility-administered medications for this visit.        Vitals:    04/03/20 1207 BP: 120/82   Pulse: (!) 102   Resp: 18   SpO2: 98%   Weight: 180 lb (81.6 kg)   Height: 5' 4\" (1.626 m)       I have reviewed the nurses notes, vitals, problem list, allergy list, medical history, family, social history and medications. Review of Symptoms:    General: Pt denies excessive weight gain or loss. Pt is able to conduct ADL's  HEENT: Denies blurred vision, headaches, epistaxis and difficulty swallowing. Respiratory: Denies shortness of breath, MOULTON, wheezing or stridor. Cardiovascular: Denies precordial pain, palpitations, edema or PND  Gastrointestinal: Denies poor appetite, indigestion, abdominal pain or blood in stool  Urinary: Denies dysuria, pyuria  Musculoskeletal: Denies pain or swelling from muscles or joints  Neurologic: Denies tremor, paresthesias, or sensory motor disturbance  Skin: Denies rash, itching or texture change. Psych: Denies depression      Physical Exam:      General: Well developed, in no acute distress. HEENT: Eyes - PERRL, no jvd  Heart:  Normal S1/S2 negative S3 or S4. Regular, no murmur, gallop or rub. Respiratory: Clear bilaterally x 4, no wheezing or rales  Extremities:  No edema, normal cap refill, no cyanosis. Musculoskeletal: No clubbing  Neuro: A&Ox3, speech clear, gait stable. Skin: Skin color is normal. No rashes or lesions. Non diaphoretic  Vascular: 2+ pulses symmetric in all extremities    Cardiographics    Ekg: sinus rhythm, ventricular rate 84. Echo:  · Left Ventricle: Normal wall thickness and systolic function (ejection fraction normal). Estimated left ventricular ejection fraction is 61 - 65%. Visually measured ejection fraction. No regional wall motion abnormality noted. Age-appropriate left ventricular diastolic function. · Left Atrium: Mildly dilated left atrium. · Mitral Valve: Trace mitral valve regurgitation. · Right Ventricle: Increased (hyperdynamic) systolic function.  Pacing wire present  · Pulmonary Artery: There is no evidence of pulmonary hypertension. Results for orders placed or performed during the hospital encounter of 09/16/15   EKG, 12 LEAD, INITIAL   Result Value Ref Range    Ventricular Rate 73 BPM    Atrial Rate 73 BPM    P-R Interval 138 ms    QRS Duration 82 ms    Q-T Interval 448 ms    QTC Calculation (Bezet) 493 ms    Calculated P Axis 51 degrees    Calculated R Axis 44 degrees    Calculated T Axis 2 degrees    Diagnosis       Normal sinus rhythm  Normal ekg  Confirmed by Rosa García ([id number]) on 9/17/2015 7:29:30 AM           Lab Results   Component Value Date/Time    WBC 9.5 09/16/2015 08:29 PM    HGB 12.2 09/16/2015 08:29 PM    HCT 36.4 09/16/2015 08:29 PM    PLATELET 556 69/17/1594 08:29 PM    MCV 92.9 09/16/2015 08:29 PM      Lab Results   Component Value Date/Time    Sodium 139 09/20/2018 12:00 AM    Potassium 3.9 09/20/2018 12:00 AM    Chloride 100 09/20/2018 12:00 AM    CO2 23 09/20/2018 12:00 AM    Anion gap 9 09/16/2015 08:29 PM    Glucose 78 09/20/2018 12:00 AM    BUN 10 09/20/2018 12:00 AM    Creatinine 0.99 09/20/2018 12:00 AM    BUN/Creatinine ratio 10 09/20/2018 12:00 AM    GFR est AA 88 09/20/2018 12:00 AM    GFR est non-AA 76 09/20/2018 12:00 AM    Calcium 9.0 09/20/2018 12:00 AM    Bilirubin, total 0.3 09/16/2015 08:29 PM    AST (SGOT) 15 09/16/2015 08:29 PM    Alk. phosphatase 57 09/16/2015 08:29 PM    Protein, total 7.9 09/16/2015 08:29 PM    Albumin 3.5 09/16/2015 08:29 PM    Globulin 4.4 (H) 09/16/2015 08:29 PM    A-G Ratio 0.8 (L) 09/16/2015 08:29 PM    ALT (SGPT) 18 09/16/2015 08:29 PM      Lab Results   Component Value Date/Time    TSH 1.330 09/20/2018 12:00 AM        Assessment:           ICD-10-CM ICD-9-CM    1. Cardiomyopathy, peripartum, postpartum O90.3 674.54    2. Class 1 obesity due to excess calories without serious comorbidity with body mass index (BMI) of 30.0 to 30.9 in adult E66.09 278.00     Z68.30 V85.30    3. Essential hypertension I10 401.9    4.  ICD (implantable cardioverter-defibrillator) in place Z95.810 V45.02 AMB POC EKG ROUTINE W/ 12 LEADS, INTER & REP     Orders Placed This Encounter    AMB POC EKG ROUTINE W/ 12 LEADS, INTER & REP     Order Specific Question:   Reason for Exam:     Answer:   routine        Plan:     Ms. Hermilo Berg is here for long follow up ICD/Cardiomyopathy management. Her ICD was placed in 2015 for post/iban partum cardiomyopathy. Device interrogation today demonstrates normal functioning with 0% pacing. ST episodes noted. EKG sinus, normotensive. Reviewed that ICDs are replaced when they reach YA. She denies cardiac complaints. She is enrolled in remote monitoring and I will see her back in 1 year. Encouraged her to find a new cardiologist as Dr Dennis Mclean has retired. Continue medical management for HTN, cardiomyopathy. Thank you for allowing me to participate in Riki Shen 's care.       Jonathan Medrano NP

## 2020-04-03 NOTE — PROGRESS NOTES
Verified patient with 2 identifiers   Reviewed record in preparation for visit and obtained necessary documentation. Chief Complaint   Patient presents with    Pacemaker Check     annual follow up - denies cardiac sx      1. Have you been to the ER, urgent care clinic since your last visit? Hospitalized since your last visit? No     2. Have you seen or consulted any other health care providers outside of the [address] [place] [address] since your last visit? Include any pap smears or colon screening.   Yes Dr Ailin Mccoy PCP

## 2020-07-04 DIAGNOSIS — I10 ESSENTIAL HYPERTENSION: ICD-10-CM

## 2020-07-04 DIAGNOSIS — Z95.810 ICD (IMPLANTABLE CARDIOVERTER-DEFIBRILLATOR) IN PLACE: ICD-10-CM

## 2020-07-04 DIAGNOSIS — R06.83 SNORING: ICD-10-CM

## 2020-07-04 DIAGNOSIS — I50.22 SYSTOLIC CHF, CHRONIC (HCC): ICD-10-CM

## 2020-07-07 RX ORDER — VALSARTAN 80 MG/1
80 TABLET ORAL 2 TIMES DAILY
Qty: 60 TAB | Refills: 2 | Status: SHIPPED | OUTPATIENT
Start: 2020-07-07 | End: 2020-10-11

## 2020-07-09 ENCOUNTER — OFFICE VISIT (OUTPATIENT)
Dept: CARDIOLOGY CLINIC | Age: 35
End: 2020-07-09

## 2020-07-09 VITALS
RESPIRATION RATE: 18 BRPM | SYSTOLIC BLOOD PRESSURE: 98 MMHG | HEIGHT: 64 IN | WEIGHT: 187 LBS | HEART RATE: 97 BPM | DIASTOLIC BLOOD PRESSURE: 67 MMHG | BODY MASS INDEX: 31.92 KG/M2 | OXYGEN SATURATION: 99 %

## 2020-07-09 DIAGNOSIS — Z95.810 ICD (IMPLANTABLE CARDIOVERTER-DEFIBRILLATOR) IN PLACE: ICD-10-CM

## 2020-07-09 DIAGNOSIS — I10 ESSENTIAL HYPERTENSION: ICD-10-CM

## 2020-07-09 NOTE — PROGRESS NOTES
Chief Complaint   Patient presents with    CHF     1. Have you been to the ER, urgent care clinic since your last visit? Hospitalized since your last visit? No    2. Have you seen or consulted any other health care providers outside of the [address] [place] [address] since your last visit? Include any pap smears or colon screening.  Yes PCP

## 2020-07-09 NOTE — PROGRESS NOTES
HISTORY OF PRESENT ILLNESS  Kev Cardenas is a 36 y.o. female     SUMMARY:   Problem List  Date Reviewed: 7/9/2020          Codes Class Noted    Class 1 obesity due to excess calories without serious comorbidity with body mass index (BMI) of 30.0 to 30.9 in adult ICD-10-CM: E66.09, Z68.30  ICD-9-CM: 278.00, V85.30  9/20/2018        Other fatigue ICD-10-CM: R53.83  ICD-9-CM: 780.79  3/80/4138        Systolic CHF, chronic (HCC) ICD-10-CM: I50.22  ICD-9-CM: 428.22, 428.0  3/0/9605        Systolic congestive heart failure with reduced left ventricular function, NYHA class 2 (HCC) ICD-10-CM: I50.20  ICD-9-CM: 428.20, 428.0  3/29/2018        HTN (hypertension) ICD-10-CM: I10  ICD-9-CM: 401.9  5/12/2015        ICD (implantable cardioverter-defibrillator) in place ICD-10-CM: Z95.810  ICD-9-CM: V45.02  5/12/2015        Cardiomyopathy, peripartum, postpartum--resolved on Rx. ICD-10-CM: O90.3  ICD-9-CM: 674.54  2/10/2015    Overview Addendum 11/6/2018  9:47 AM by Fariha Chisholm MD     ECHO 2013: EF 50%, mild MR/TR   ECHO 1/2015: EF 30-35%  ECHO 5/2015:LVD, EF 25-30%, [address]  ECHO 2016: EF 50%  ECHO 2017: EF 40-45%, mild DHK, LVH, RV mild dilated ECHO 1/2018: EF 50%, no WMA, wall thickness < no (.8-.9 cm), mil MR/TR,   ECHO 2018: EF 55-60%    CARDIAC MRI 4/2016: LVEF 50%, mild GHK, RVEF 60%      AICD 2015: Biotronik               Vitamin D deficiency ICD-10-CM: E55.9  ICD-9-CM: 268.9  11/29/2011        Snoring ICD-10-CM: R06.83  ICD-9-CM: 786.09 11/23/2011              Current Outpatient Medications on File Prior to Visit   Medication Sig    valsartan (DIOVAN) 80 mg tablet TAKE 1 TAB BY MOUTH TWO (2) TIMES A DAY. INDICATIONS: CHRONIC HEART FAILURE    carvediloL (COREG) 25 mg tablet TAKE 1 TABLET BY MOUTH TWICE A DAY WITH MEALS    chlorthalidone (HYGROTEN) 25 mg tablet Take 0.5 Tabs by mouth daily as needed.  Indications: Edema (Patient taking differently: Take  by mouth daily as needed.)    COQ10, UBIQUINOL, PO Take 100 mg by mouth two (2) times a day.  CHOLECALCIFEROL, VITAMIN D3, (VITAMIN D3 PO) Take  by mouth daily.  levocetirizine (XYZAL) 5 mg tablet Take 1 Tab by mouth daily.  fluticasone (FLONASE) 50 mcg/actuation nasal spray 2 Sprays by Both Nostrils route daily. No current facility-administered medications on file prior to visit. CARDIOLOGY STUDIES TO DATE:  ECHO 2013: EF 50%, mild MR/TR   ECHO 1/2015: EF 30-35%  ECHO 5/2015:LVD, EF 25-30%, [address]  ECHO 2016: EF 50%  ECHO 2017: EF 40-45%, mild DHK, LVH, RV mild dilated   ECHO 1/2018: EF 50%, no WMA, wall thickness < normal (.8-.9 cm), mild MR/TR,   ECHO 6/14/18: LVD, EF 55-60%, mild mitral annular dilation, trivial MR/TR   07/01/19  echo normal lvef, lae, tr mr           CARDIAC MRI 4/2016: LVEF 50%, mild GHK, RVEF 60%      AICD 2015: Biotronik    Chief Complaint   Patient presents with    CHF     HPI :  She was previously followed by Dr. Arti Franco for a peripartum cardiomyopathy. On optimal medical therapy she improved and as of last year her echo had normalized. Her blood pressure is low but she is asymptomatic. She walks about an hour a day almost every day of the week with no worrisome symptoms.   CARDIAC ROS:   negative for chest pain, dyspnea, palpitations, syncope, orthopnea, paroxysmal nocturnal dyspnea, exertional chest pressure/discomfort, claudication, lower extremity edema    Family History   Problem Relation Age of Onset    Hypertension Mother     Hypertension Father     Diabetes Maternal Grandmother     Heart Disease Maternal Grandmother     Arthritis-osteo Maternal Grandfather     Asthma Brother     Arthritis-osteo Paternal Grandmother     Arthritis-osteo Paternal Grandfather        Past Medical History:   Diagnosis Date    AICD (automatic cardioverter/defibrillator) present     Anemia NEC     Cardiomyopathy, peripartum, postpartum 2/10/2015    ECHO 2013: EF 50%, mild MR/TR  ECHO 1/2015: EF 30-35% ECHO 5/2015:LVD, EF 25-30%, [address] ECHO 2016: EF 50% ECHO 2017: EF 40-45%, mild DHK, LVH, RV mild dilated ECHO 1/2018: EF 50%, no WMA, wall thickness < no (.8-.9 cm), mil MR/TR,   CARDIAC MRI 4/2016: LVEF 50%, mild GHK, RVEF 60%   AICD 2015: Biotronik     Class 1 obesity due to excess calories without serious comorbidity with body mass index (BMI) of 30.0 to 30.9 in adult 9/20/2018    Contact dermatitis and other eczema, due to unspecified cause     hives    Hypertension     Other fatigue 5/16/3458    Systolic congestive heart failure with reduced left ventricular function, NYHA class 2 ([address] 3/29/2018       GENERAL ROS:  A comprehensive review of systems was negative except for that written in the HPI.     Visit Vitals  BP 98/67 (BP 1 Location: Right arm, BP Patient Position: Sitting)   Pulse 97   Resp 18   Ht 5' 4\" (1.626 m)   Wt 187 lb (84.8 kg)   SpO2 99%   BMI 32.10 kg/m²       Wt Readings from Last 3 Encounters:   07/09/20 187 lb (84.8 kg)   04/03/20 180 lb (81.6 kg)   11/13/19 175 lb 3.2 oz (79.5 kg)            BP Readings from Last 3 Encounters:   07/09/20 98/67   04/03/20 120/82   11/13/19 112/85       PHYSICAL EXAM  General appearance: alert, cooperative, no distress, appears stated age  Neurologic: Alert and oriented X 3  Neck: supple, symmetrical, trachea midline, no adenopathy, no carotid bruit and no JVD  Lungs: clear to auscultation bilaterally  Heart: regular rate and rhythm, S1, S2 normal, no murmur, click, rub or gallop  Extremities: extremities normal, atraumatic, no cyanosis or edema    Lab Results   Component Value Date/Time    Cholesterol, total 139 06/02/2016 08:19 AM    Cholesterol, total 129 05/12/2015 10:16 AM    Cholesterol, total 156 01/23/2014 11:23 AM    Cholesterol, total 149 12/21/2012 12:00 PM    HDL Cholesterol 45 06/02/2016 08:19 AM    HDL Cholesterol 49 05/12/2015 10:16 AM    HDL Cholesterol 52 01/23/2014 11:23 AM    HDL Cholesterol 54 12/21/2012 12:00 PM    LDL, calculated 76 06/02/2016 08:19 AM LDL, calculated 70 05/12/2015 10:16 AM    LDL, calculated 86 01/23/2014 11:23 AM    LDL, calculated 88 12/21/2012 12:00 PM    Triglyceride 89 06/02/2016 08:19 AM    Triglyceride 48 05/12/2015 10:16 AM    Triglyceride 90 01/23/2014 11:23 AM    Triglyceride 36 12/21/2012 12:00 PM     ASSESSMENT :      She is stable and asymptomatic, well compensated on a reasonable medical regimen. Were going to repeat her echocardiogram and follow those results up by phone. current treatment plan is effective, no change in therapy  lab results and schedule of future lab studies reviewed with patient  reviewed diet, exercise and weight control    Encounter Diagnoses   Name Primary?  Cardiomyopathy, peripartum, postpartum Yes    Essential hypertension     ICD (implantable cardioverter-defibrillator) in place      No orders of the defined types were placed in this encounter. Follow-up and Dispositions    · Return in about 6 months (around 1/9/2021). Gallo Hope MD  7/9/2020  Please note that this dictation was completed with [employer], the computer voice recognition software. Quite often unanticipated grammatical, syntax, homophones, and other interpretive errors are inadvertently transcribed by the computer software. Please disregard these errors. Please excuse any errors that have escaped final proofreading. Thank you.

## 2020-07-10 ENCOUNTER — OFFICE VISIT (OUTPATIENT)
Dept: CARDIOLOGY CLINIC | Age: 35
End: 2020-07-10

## 2020-07-10 DIAGNOSIS — Z95.810 ICD (IMPLANTABLE CARDIOVERTER-DEFIBRILLATOR) IN PLACE: Primary | ICD-10-CM

## 2020-08-11 ENCOUNTER — HOSPITAL ENCOUNTER (OUTPATIENT)
Dept: NON INVASIVE DIAGNOSTICS | Age: 35
Discharge: HOME | End: 2020-08-11
Attending: SPECIALIST
Payer: COMMERCIAL

## 2020-08-11 DIAGNOSIS — Z95.810 ICD (IMPLANTABLE CARDIOVERTER-DEFIBRILLATOR) IN PLACE: ICD-10-CM

## 2020-08-11 DIAGNOSIS — I10 ESSENTIAL HYPERTENSION: ICD-10-CM

## 2020-08-11 LAB
ECHO AO ROOT DIAM: 2.38 CM
ECHO AV AREA PLAN: 3.18 CM2
ECHO EST RA PRESSURE: 5 MMHG
ECHO LA AREA 4C: 15.47 CM2
ECHO LA MAJOR AXIS: 3.1 CM
ECHO LA VOL 4C: 31.63 ML (ref 22–52)
ECHO LV EDV A4C: 124.78 ML
ECHO LV EJECTION FRACTION A4C: 53 PERCENT
ECHO LV ESV A4C: 58.84 ML
ECHO LV INTERNAL DIMENSION DIASTOLIC: 4.9 CM (ref 3.9–5.3)
ECHO LV INTERNAL DIMENSION SYSTOLIC: 3.76 CM
ECHO LV IVSD: 0.88 CM (ref 0.6–0.9)
ECHO LV MASS 2D: 179.6 G (ref 67–162)
ECHO LV POSTERIOR WALL DIASTOLIC: 1.15 CM (ref 0.6–0.9)
ECHO LVOT DIAM: 1.59 CM
ECHO LVOT PEAK GRADIENT: 1.92 MMHG
ECHO LVOT PEAK VELOCITY: 69.32 CM/S
ECHO MV A VELOCITY: 30.49 CM/S
ECHO MV AREA PLAN: 7.56 CM2
ECHO MV E DECELERATION TIME (DT): 0.1 S
ECHO MV E VELOCITY: 23.68 CM/S
ECHO MV E/A RATIO: 0.78
ECHO MV MAX VELOCITY: 41.54 CM/S
ECHO MV MEAN GRADIENT: 0.28 MMHG
ECHO MV PEAK GRADIENT: 0.69 MMHG
ECHO MV VTI: 8.62 CM
ECHO PV PEAK INSTANTANEOUS GRADIENT SYSTOLIC: 0.93 MMHG
ECHO PV REGURGITANT MAX VELOCITY: 110.93 CM/S
ECHO PV REGURGITANT MAX VELOCITY: 48.15 CM/S
ECHO RA AREA 4C: 10.54 CM2
ECHO RIGHT VENTRICULAR SYSTOLIC PRESSURE (RVSP): 14.95 MMHG
ECHO TV REGURGITANT MAX VELOCITY: 157.72 CM/S
ECHO TV REGURGITANT PEAK GRADIENT: 9.95 MMHG

## 2020-08-11 PROCEDURE — 93306 TTE W/DOPPLER COMPLETE: CPT

## 2020-08-11 NOTE — PROGRESS NOTES
Spoke with patient  Verified patient with 2 patient identifiers  Informed per Dr Dulce Mccabe ECHO shows heart muscle a little weaker than last time but still ok. Continue same medications and we will repeat ECHO in 1 yr. Patient verbalized understanding.

## 2020-08-11 NOTE — PROGRESS NOTES
Attempt to contact patient re:ECHO results below.    Patient not available, mailbox full unable to leave message

## 2020-08-11 NOTE — PROGRESS NOTES
Heart muscle a little weaker than last time but still ok.  Stay on meds and we will recheck around time of next visit

## 2020-09-02 DIAGNOSIS — Z95.810 ICD (IMPLANTABLE CARDIOVERTER-DEFIBRILLATOR) IN PLACE: ICD-10-CM

## 2020-09-02 DIAGNOSIS — I10 ESSENTIAL HYPERTENSION: ICD-10-CM

## 2020-09-02 RX ORDER — CARVEDILOL 25 MG/1
TABLET ORAL
Qty: 60 TAB | Refills: 5 | Status: SHIPPED | OUTPATIENT
Start: 2020-09-02 | End: 2021-02-15 | Stop reason: SDUPTHER

## 2020-10-09 ENCOUNTER — OFFICE VISIT (OUTPATIENT)
Dept: CARDIOLOGY CLINIC | Age: 35
End: 2020-10-09
Payer: COMMERCIAL

## 2020-10-09 DIAGNOSIS — Z95.810 ICD (IMPLANTABLE CARDIOVERTER-DEFIBRILLATOR) IN PLACE: Primary | ICD-10-CM

## 2020-10-09 PROCEDURE — 93295 DEV INTERROG REMOTE 1/2/MLT: CPT | Performed by: INTERNAL MEDICINE

## 2020-10-09 PROCEDURE — 93296 REM INTERROG EVL PM/IDS: CPT | Performed by: INTERNAL MEDICINE

## 2020-10-10 DIAGNOSIS — I50.22 SYSTOLIC CHF, CHRONIC (HCC): ICD-10-CM

## 2020-10-10 DIAGNOSIS — R06.83 SNORING: ICD-10-CM

## 2020-10-10 DIAGNOSIS — I10 ESSENTIAL HYPERTENSION: ICD-10-CM

## 2020-10-10 DIAGNOSIS — Z95.810 ICD (IMPLANTABLE CARDIOVERTER-DEFIBRILLATOR) IN PLACE: ICD-10-CM

## 2020-10-11 RX ORDER — VALSARTAN 80 MG/1
80 TABLET ORAL 2 TIMES DAILY
Qty: 60 TAB | Refills: 0 | Status: SHIPPED | OUTPATIENT
Start: 2020-10-11 | End: 2020-11-29

## 2020-10-20 ENCOUNTER — TELEPHONE (OUTPATIENT)
Dept: CARDIOLOGY CLINIC | Age: 35
End: 2020-10-20

## 2020-10-20 DIAGNOSIS — E78.00 HIGH CHOLESTEROL: Primary | ICD-10-CM

## 2020-10-20 NOTE — TELEPHONE ENCOUNTER
Placed call to pt. Two pt identifiers confirmed. Message from NP Laisha Vo relayed to pt. Informed pt lab slip will be mailed. Address verified with pt. Pt verbalized understanding of information discussed w/ no further questions at this time.

## 2020-10-20 NOTE — TELEPHONE ENCOUNTER
----- Message from Olivia Smith NP sent at 10/11/2020  8:28 PM EDT -----  Regarding: Valsartan refill  Please call patient, I refilled her valsartan for 1 month, she needs labs, CMP Lipids. Please send her lab slip, if they were done by her PCP this year then we need a copy.

## 2020-11-20 DIAGNOSIS — E78.00 HIGH CHOLESTEROL: ICD-10-CM

## 2020-11-28 DIAGNOSIS — I50.22 SYSTOLIC CHF, CHRONIC (HCC): ICD-10-CM

## 2020-11-28 DIAGNOSIS — I10 ESSENTIAL HYPERTENSION: ICD-10-CM

## 2020-11-28 DIAGNOSIS — R06.83 SNORING: ICD-10-CM

## 2020-11-28 DIAGNOSIS — Z95.810 ICD (IMPLANTABLE CARDIOVERTER-DEFIBRILLATOR) IN PLACE: ICD-10-CM

## 2020-11-29 RX ORDER — VALSARTAN 80 MG/1
80 TABLET ORAL 2 TIMES DAILY
Qty: 60 TAB | Refills: 0 | Status: SHIPPED | OUTPATIENT
Start: 2020-11-29 | End: 2020-12-30

## 2020-12-29 DIAGNOSIS — Z95.810 ICD (IMPLANTABLE CARDIOVERTER-DEFIBRILLATOR) IN PLACE: ICD-10-CM

## 2020-12-29 DIAGNOSIS — R06.83 SNORING: ICD-10-CM

## 2020-12-29 DIAGNOSIS — I10 ESSENTIAL HYPERTENSION: ICD-10-CM

## 2020-12-29 DIAGNOSIS — I50.22 SYSTOLIC CHF, CHRONIC (HCC): ICD-10-CM

## 2020-12-30 RX ORDER — VALSARTAN 80 MG/1
80 TABLET ORAL 2 TIMES DAILY
Qty: 60 TAB | Refills: 0 | Status: SHIPPED | OUTPATIENT
Start: 2020-12-30 | End: 2021-01-26

## 2021-01-08 ENCOUNTER — OFFICE VISIT (OUTPATIENT)
Dept: CARDIOLOGY CLINIC | Age: 36
End: 2021-01-08
Payer: COMMERCIAL

## 2021-01-08 DIAGNOSIS — Z95.810 ICD (IMPLANTABLE CARDIOVERTER-DEFIBRILLATOR) IN PLACE: Primary | ICD-10-CM

## 2021-01-08 PROCEDURE — 93295 DEV INTERROG REMOTE 1/2/MLT: CPT | Performed by: INTERNAL MEDICINE

## 2021-01-08 PROCEDURE — 93296 REM INTERROG EVL PM/IDS: CPT | Performed by: INTERNAL MEDICINE

## 2021-01-21 ENCOUNTER — OFFICE VISIT (OUTPATIENT)
Dept: CARDIOLOGY CLINIC | Age: 36
End: 2021-01-21
Payer: COMMERCIAL

## 2021-01-21 VITALS
HEART RATE: 90 BPM | SYSTOLIC BLOOD PRESSURE: 113 MMHG | RESPIRATION RATE: 16 BRPM | BODY MASS INDEX: 33.97 KG/M2 | HEIGHT: 64 IN | WEIGHT: 199 LBS | DIASTOLIC BLOOD PRESSURE: 82 MMHG | TEMPERATURE: 98.4 F | OXYGEN SATURATION: 98 %

## 2021-01-21 DIAGNOSIS — I10 ESSENTIAL HYPERTENSION: ICD-10-CM

## 2021-01-21 DIAGNOSIS — I50.22 SYSTOLIC CHF, CHRONIC (HCC): Primary | ICD-10-CM

## 2021-01-21 DIAGNOSIS — Z95.810 ICD (IMPLANTABLE CARDIOVERTER-DEFIBRILLATOR) IN PLACE: ICD-10-CM

## 2021-01-21 PROCEDURE — 99213 OFFICE O/P EST LOW 20 MIN: CPT | Performed by: SPECIALIST

## 2021-01-21 NOTE — PROGRESS NOTES
HISTORY OF PRESENT ILLNESS  Percy Tom is a 30 y.o. female     SUMMARY:   Problem List  Date Reviewed: 1/21/2021          Codes Class Noted    Class 1 obesity due to excess calories without serious comorbidity with body mass index (BMI) of 30.0 to 30.9 in adult ICD-10-CM: E66.09, Z68.30  ICD-9-CM: 278.00, V85.30  9/20/2018        Other fatigue ICD-10-CM: R53.83  ICD-9-CM: 780.79  7/20/9729        Systolic CHF, chronic (HCC) ICD-10-CM: I50.22  ICD-9-CM: 428.22, 428.0  4/5/8031        Systolic congestive heart failure with reduced left ventricular function, NYHA class 2 (HCC) ICD-10-CM: I50.20  ICD-9-CM: 428.20, 428.0  3/29/2018        HTN (hypertension) ICD-10-CM: I10  ICD-9-CM: 401.9  5/12/2015        ICD (implantable cardioverter-defibrillator) in place ICD-10-CM: Z95.810  ICD-9-CM: V45.02  5/12/2015        Cardiomyopathy, peripartum, postpartum--resolved on Rx. ICD-10-CM: O90.3  ICD-9-CM: 674.54  2/10/2015    Overview Addendum 11/6/2018  9:47 AM by Miriam Huitron     ECHO 2013: EF 50%, mild MR/TR   ECHO 1/2015: EF 30-35%  ECHO 5/2015:LVD, EF 25-30%, [address]  ECHO 2016: EF 50%  ECHO 2017: EF 40-45%, mild DHK, LVH, RV mild dilated ECHO 1/2018: EF 50%, no WMA, wall thickness < no (.8-.9 cm), mil MR/TR,   ECHO 2018: EF 55-60%    CARDIAC MRI 4/2016: LVEF 50%, mild GHK, RVEF 60%      AICD 2015: Biotronik               Vitamin D deficiency ICD-10-CM: E55.9  ICD-9-CM: 268.9  11/29/2011        Snoring ICD-10-CM: R06.83  ICD-9-CM: 786.09  11/23/2011              Current Outpatient Medications on File Prior to Visit   Medication Sig    valsartan (DIOVAN) 80 mg tablet TAKE 1 TAB BY MOUTH TWO (2) TIMES A DAY. INDICATIONS: CHRONIC HEART FAILURE    carvediloL (COREG) 25 mg tablet TAKE 1 TABLET BY MOUTH TWICE A DAY WITH MEALS    chlorthalidone (HYGROTEN) 25 mg tablet Take 0.5 Tabs by mouth daily as needed.  Indications: Edema (Patient taking differently: Take  by mouth daily as needed.)    COQ10, UBIQUINOL, PO Take 100 mg by mouth two (2) times a day.  CHOLECALCIFEROL, VITAMIN D3, (VITAMIN D3 PO) Take  by mouth daily.  levocetirizine (XYZAL) 5 mg tablet Take 1 Tab by mouth daily.  fluticasone (FLONASE) 50 mcg/actuation nasal spray 2 Sprays by Both Nostrils route daily. No current facility-administered medications on file prior to visit. CARDIOLOGY STUDIES TO DATE:  [employer]-ICD, DOI 3-19-15     ECHO 2013: EF 50%, mild MR/TR   ECHO 1/2015: EF 30-35%   ECHO 5/2015:LVD, EF 25-30%, [address]   ECHO 2016: EF 50%   ECHO 2017: EF 40-45%, mild DHK, LVH, RV mild dilated   ECHO 1/2018: EF 50%, no WMA, wall thickness < normal (.8-.9 cm), mild MR/TR,   ECHO 6/14/18: LVD, EF 55-60%, mild mitral annular dilation, trivial MR/TR   07/01/19 echo normal lvef, lae, tr mr   CARDIAC MRI 4/2016: LVEF 50%, mild GHK, RVEF 60%       AICD 2015: Biotronik    08/11/20  echo lvef 40-45%, mild tr    Chief Complaint   Patient presents with    Cardiomyopathy     6m f/u, Pt has no complaints. HPI :  She is doing pretty well. She is walking some but not often. She is working from home and she is having no problems with her medications though occasionally she forgets to take her second doses. She wanted to know about coming off medication because she is thinking about family planning and may be having another child. Her EF had completely normalized as of 2019 but last summer we repeated her echo and her EF had dropped a bit. She thinks that was because of inconsistent medication dosing.   CARDIAC ROS:   negative for chest pain, dyspnea, palpitations, syncope, orthopnea, paroxysmal nocturnal dyspnea, exertional chest pressure/discomfort, claudication, lower extremity edema    Family History   Problem Relation Age of Onset    Hypertension Mother     Hypertension Father     Diabetes Maternal Grandmother     Heart Disease Maternal Grandmother     Arthritis-osteo Maternal Grandfather     Asthma Brother     Arthritis-osteo Paternal Grandmother     Arthritis-osteo Paternal Grandfather        Past Medical History:   Diagnosis Date    AICD (automatic cardioverter/defibrillator) present     Anemia NEC     Cardiomyopathy, peripartum, postpartum 2/10/2015    ECHO 2013: EF 50%, mild MR/TR  ECHO 1/2015: EF 30-35% ECHO 5/2015:LVD, EF 25-30%, [address] ECHO 2016: EF 50% ECHO 2017: EF 40-45%, mild DHK, LVH, RV mild dilated ECHO 1/2018: EF 50%, no WMA, wall thickness < no (.8-.9 cm), mil MR/TR,   CARDIAC MRI 4/2016: LVEF 50%, mild GHK, RVEF 60%   AICD 2015: Biotronik     Class 1 obesity due to excess calories without serious comorbidity with body mass index (BMI) of 30.0 to 30.9 in adult 9/20/2018    Contact dermatitis and other eczema, due to unspecified cause     hives    Hypertension     Other fatigue 3/79/5137    Systolic congestive heart failure with reduced left ventricular function, NYHA class 2 ([address] 3/29/2018       GENERAL ROS:  A comprehensive review of systems was negative except for that written in the HPI.     Visit Vitals  /82 (BP 1 Location: Right arm, BP Patient Position: Sitting)   Pulse 90   Temp 98.4 °F (36.9 °C) (Temporal)   Resp 16   Ht 5' 4\" (1.626 m)   Wt 199 lb (90.3 kg)   LMP 01/15/2021   SpO2 98%   BMI 34.16 kg/m²       Wt Readings from Last 3 Encounters:   01/21/21 199 lb (90.3 kg)   07/09/20 187 lb (84.8 kg)   04/03/20 180 lb (81.6 kg)            BP Readings from Last 3 Encounters:   01/21/21 113/82   07/09/20 98/67   04/03/20 120/82       PHYSICAL EXAM  General appearance: alert, cooperative, no distress, appears stated age  Neurologic: Alert and oriented X 3  Neck: supple, symmetrical, trachea midline, no adenopathy, no carotid bruit and no JVD  Lungs: clear to auscultation bilaterally  Heart: regular rate and rhythm, S1, S2 normal, no murmur, click, rub or gallop  Extremities: extremities normal, atraumatic, no cyanosis or edema    Lab Results   Component Value Date/Time    Cholesterol, total 139 06/02/2016 08:19 AM Cholesterol, total 129 05/12/2015 10:16 AM    Cholesterol, total 156 01/23/2014 11:23 AM    Cholesterol, total 149 12/21/2012 12:00 PM    HDL Cholesterol 45 06/02/2016 08:19 AM    HDL Cholesterol 49 05/12/2015 10:16 AM    HDL Cholesterol 52 01/23/2014 11:23 AM    HDL Cholesterol 54 12/21/2012 12:00 PM    LDL, calculated 76 06/02/2016 08:19 AM    LDL, calculated 70 05/12/2015 10:16 AM    LDL, calculated 86 01/23/2014 11:23 AM    LDL, calculated 88 12/21/2012 12:00 PM    Triglyceride 89 06/02/2016 08:19 AM    Triglyceride 48 05/12/2015 10:16 AM    Triglyceride 90 01/23/2014 11:23 AM    Triglyceride 36 12/21/2012 12:00 PM     ASSESSMENT :      Because she had a peripartum cardiomyopathy, and because her EF dropped a little bit perhaps from inconsistent medications, I think it is a unwise move to consider pregnancy. I do not think it would be safe for her to take valsartan and there may be even some issues with carvedilol going forward during her pregnancy. I advised that she discuss this with her OB/GYN as well. Working to repeat her echo in March to see where we stand now that she is taking medications more consistently. current treatment plan is effective, no change in therapy  lab results and schedule of future lab studies reviewed with patient  reviewed diet, exercise and weight control    Encounter Diagnoses   Name Primary?  Systolic CHF, chronic ([address] Yes    Cardiomyopathy, peripartum, postpartum--resolved on Rx.  ICD (implantable cardioverter-defibrillator) in place     Essential hypertension      No orders of the defined types were placed in this encounter. Follow-up and Dispositions    · Return in about 6 months (around 7/21/2021). Mathew Guerra MD  1/21/2021  Please note that this dictation was completed with [employer], the [employer] voice recognition software.   Quite often unanticipated grammatical, syntax, homophones, and other interpretive errors are inadvertently transcribed by the computer software. Please disregard these errors. Please excuse any errors that have escaped final proofreading. Thank you.

## 2021-02-15 DIAGNOSIS — Z95.810 ICD (IMPLANTABLE CARDIOVERTER-DEFIBRILLATOR) IN PLACE: ICD-10-CM

## 2021-02-15 DIAGNOSIS — I10 ESSENTIAL HYPERTENSION: ICD-10-CM

## 2021-02-15 NOTE — TELEPHONE ENCOUNTER
PCP: Patti Dennis MD    Last appt: 1/21/2021  Future Appointments   Date Time Provider Tommy Sahu   3/23/2021  1:00 PM [facility] ECHO MACHINE [facility]   4/14/2021  8:45 AM PACEMAKER, [facility] AMB   4/14/2021  9:00 AM Amara Marquez, ANP [facility] AMB   6/8/2021 10:20 AM Fern Ramos MD [facility] AMB       Requested Prescriptions     Pending Prescriptions Disp Refills    carvediloL (COREG) 25 mg tablet 60 Tab 5     Sig: TAKE 1 TABLET BY MOUTH TWICE A DAY WITH MEALS

## 2021-02-16 RX ORDER — CARVEDILOL 25 MG/1
TABLET ORAL
Qty: 180 TAB | Refills: 1 | Status: SHIPPED | OUTPATIENT
Start: 2021-02-16 | End: 2021-08-13

## 2021-02-16 NOTE — TELEPHONE ENCOUNTER
Requested Prescriptions     Signed Prescriptions Disp Refills    carvediloL (COREG) 25 mg tablet 180 Tab 1     Sig: TAKE 1 TABLET BY MOUTH TWICE A DAY WITH MEALS     Authorizing Provider: Murtaza Cook     Ordering User: Mikala Perea     Verbal order per Dr. BARRIOS [facility].     Future Appointments   Date Time Provider Tommy Sahu   3/23/2021  1:00 PM [facility]   4/14/2021  8:45 AM PACEMAKER, [facility] AMB   4/14/2021  9:00 AM Amara Marquez, ANP [facility] AMB   6/8/2021 10:20 AM Bernadette Raines MD [facility] AMB

## 2021-03-16 ENCOUNTER — OFFICE VISIT (OUTPATIENT)
Dept: CARDIOLOGY CLINIC | Age: 36
End: 2021-03-16
Payer: COMMERCIAL

## 2021-03-16 ENCOUNTER — OFFICE VISIT (OUTPATIENT)
Dept: CARDIOLOGY CLINIC | Age: 36
End: 2021-03-16

## 2021-03-16 VITALS
HEIGHT: 64 IN | BODY MASS INDEX: 33.32 KG/M2 | SYSTOLIC BLOOD PRESSURE: 130 MMHG | RESPIRATION RATE: 14 BRPM | DIASTOLIC BLOOD PRESSURE: 80 MMHG | OXYGEN SATURATION: 99 % | HEART RATE: 80 BPM | WEIGHT: 195.2 LBS

## 2021-03-16 DIAGNOSIS — E78.00 HIGH CHOLESTEROL: ICD-10-CM

## 2021-03-16 DIAGNOSIS — R06.00 DYSPNEA, UNSPECIFIED TYPE: ICD-10-CM

## 2021-03-16 DIAGNOSIS — I10 ESSENTIAL HYPERTENSION: ICD-10-CM

## 2021-03-16 DIAGNOSIS — E66.09 CLASS 1 OBESITY DUE TO EXCESS CALORIES WITHOUT SERIOUS COMORBIDITY WITH BODY MASS INDEX (BMI) OF 30.0 TO 30.9 IN ADULT: ICD-10-CM

## 2021-03-16 DIAGNOSIS — I50.20 SYSTOLIC CONGESTIVE HEART FAILURE WITH REDUCED LEFT VENTRICULAR FUNCTION, NYHA CLASS 2 (HCC): ICD-10-CM

## 2021-03-16 DIAGNOSIS — I50.22 SYSTOLIC CHF, CHRONIC (HCC): Primary | ICD-10-CM

## 2021-03-16 DIAGNOSIS — Z95.810 ICD (IMPLANTABLE CARDIOVERTER-DEFIBRILLATOR) IN PLACE: ICD-10-CM

## 2021-03-16 DIAGNOSIS — I50.22 SYSTOLIC CHF, CHRONIC (HCC): ICD-10-CM

## 2021-03-16 PROCEDURE — 93000 ELECTROCARDIOGRAM COMPLETE: CPT | Performed by: INTERNAL MEDICINE

## 2021-03-16 PROCEDURE — 99204 OFFICE O/P NEW MOD 45 MIN: CPT | Performed by: INTERNAL MEDICINE

## 2021-03-16 RX ORDER — VALSARTAN 160 MG/1
160 TABLET ORAL DAILY
Qty: 90 TAB | Refills: 3 | Status: SHIPPED | OUTPATIENT
Start: 2021-03-16 | End: 2022-02-10

## 2021-03-16 RX ORDER — CHLORTHALIDONE 25 MG/1
12.5 TABLET ORAL
Qty: 45 TAB | Refills: 1 | Status: SHIPPED | OUTPATIENT
Start: 2021-03-16 | End: 2021-07-20

## 2021-03-16 NOTE — LETTER
3/16/2021 Patient: Carito Barrios YOB: 1985 Date of Visit: 3/16/2021 Janet Fink MD 
[address] [place] [address] [phone] Via Fax: [phone] Dear Janet Fink MD, Thank you for referring Ms. Jeffrey New to [facility] for evaluation. My notes for this consultation are attached. If you have questions, please do not hesitate to call me. I look forward to following your patient along with you. Sincerely, Alexander Drew MD

## 2021-03-16 NOTE — PROGRESS NOTES
Reason for consult: establish new cardiovascular care  Requesting physician: Dr. Pili Mcconnell      HPI: Burgess Vallecillo, a 30y.o. year-old who presents for evaluation of . Saw Dr. Jasmin Mc    No chest pain or palpitations  No dyspnea with exertion   No PND or orthopnea  No dizziness   No LE edema     Walks a few times/week - 40 min - 1 hour       One pregnancy - deliver Sept 2013, 6 months later with EF 40-45%  Lowest EF 25% in May 2015  Got ICD in March 2015        Assessment/Plan:    Post partum cardiomyopathy - EF 40-45% by last TTE, will repeat TTE now to reassess, s/p AICD followed by Dr. Gwendolyn Holder   -continue coreg and valsartan          Fam Hx: no early CAD  Soc Hx: no tobacco use, occ etoh use, no drug use       She  has a past medical history of AICD (automatic cardioverter/defibrillator) present, Anemia NEC, Cardiomyopathy, peripartum, postpartum (2/10/2015), Class 1 obesity due to excess calories without serious comorbidity with body mass index (BMI) of 30.0 to 30.9 in adult (9/20/2018), Contact dermatitis and other eczema, due to unspecified cause, Hypertension, Other fatigue (2/41/4165), and Systolic congestive heart failure with reduced left ventricular function, NYHA class 2 ([address] (3/29/2018).  She also has no past medical history of Abnormal Pap smear, Abuse, Acquired hypothyroidism, Asthma, Chlamydia, Complication of anesthesia, Diabetes mellitus, Genital herpes, unspecified, Gonorrhea, Heart abnormalities, Herpes gestationis, Herpes simplex without mention of complication, History of abuse, Human immunodeficiency virus (HIV) disease ([address] OTHER MEDICAL, Infertility, Kidney disease, Liver disease, Phlebitis and thrombophlebitis of unspecified site, Postpartum depression, Psychiatric problem, Rhesus isoimmunization unspecified as to episode of care in pregnancy, Sickle-cell disease, unspecified, Syphilis, Systemic lupus erythematosus ([address] Trauma, Unspecified breast disorder, Unspecified diseases of blood and blood-forming organs, or Unspecified epilepsy without mention of intractable epilepsy. Cardiovascular ROS: no chest pain or dyspnea on exertion  Respiratory ROS: no cough, shortness of breath, or wheezing  Neurological ROS: no TIA or stroke symptoms  All other systems negative except as above. PE  Vitals:    03/16/21 0809   Resp: 14   Weight: 195 lb 3.2 oz (88.5 kg)   Height: 5' 4\" (1.626 m)    Body mass index is 33.51 kg/m².    General appearance - alert, well appearing, and in no distress  Mental status - affect appropriate to mood  Eyes - sclera anicteric, moist mucous membranes  Neck - supple  Lymphatics - not assessed  Chest - clear to auscultation, no wheezes, rales or rhonchi  Heart - normal rate, regular rhythm, normal S1, S2, no murmurs, rubs, clicks or gallops  Abdomen - soft, nontender, nondistended  Back exam - full range of motion, no tenderness  Neurological - cranial nerves II through XII grossly intact, no focal deficit  Musculoskeletal - no muscular tenderness noted, normal strength  Extremities - peripheral pulses normal, no pedal edema  Skin - normal coloration  no rashes    12 lead ECG:    Recent Labs:  Lab Results   Component Value Date/Time    Cholesterol, total 139 06/02/2016 08:19 AM    HDL Cholesterol 45 06/02/2016 08:19 AM    LDL, calculated 76 06/02/2016 08:19 AM    Triglyceride 89 06/02/2016 08:19 AM     Lab Results   Component Value Date/Time    Creatinine 0.99 09/20/2018 12:00 AM     Lab Results   Component Value Date/Time    BUN 10 09/20/2018 12:00 AM     Lab Results   Component Value Date/Time    Potassium 3.9 09/20/2018 12:00 AM     Lab Results   Component Value Date/Time    Hemoglobin A1c 5.6 06/02/2016 08:19 AM     Lab Results   Component Value Date/Time    HGB 12.2 09/16/2015 08:29 PM     Lab Results   Component Value Date/Time    PLATELET 187 35/90/9848 08:29 PM       Reviewed:  Past Medical History:   Diagnosis Date    AICD (automatic cardioverter/defibrillator) present     Anemia NEC     Cardiomyopathy, peripartum, postpartum 2/10/2015    ECHO 2013: EF 50%, mild MR/TR  ECHO 1/2015: EF 30-35% ECHO 5/2015:LVD, EF 25-30%, [address] ECHO 2016: EF 50% ECHO 2017: EF 40-45%, mild DHK, LVH, RV mild dilated ECHO 1/2018: EF 50%, no WMA, wall thickness < no (.8-.9 cm), mil MR/TR,   CARDIAC MRI 4/2016: LVEF 50%, mild GHK, RVEF 60%   AICD 2015: Biotronik     Class 1 obesity due to excess calories without serious comorbidity with body mass index (BMI) of 30.0 to 30.9 in adult 9/20/2018    Contact dermatitis and other eczema, due to unspecified cause     hives    Hypertension     Other fatigue 0/47/1614    Systolic congestive heart failure with reduced left ventricular function, NYHA class 2 ([address] 3/29/2018     Social History     Tobacco Use   Smoking Status Never Smoker   Smokeless Tobacco Never Used     Social History     Substance and Sexual Activity   Alcohol Use No     Allergies   Allergen Reactions    Other Medication Hives     Allergic, to dust mites, molds    Pollen Extracts Hives       Current Outpatient Medications   Medication Sig    carvediloL (COREG) 25 mg tablet TAKE 1 TABLET BY MOUTH TWICE A DAY WITH MEALS    valsartan (DIOVAN) 80 mg tablet TAKE 1 TAB BY MOUTH TWO (2) TIMES A DAY. INDICATIONS: CHRONIC HEART FAILURE    chlorthalidone (HYGROTEN) 25 mg tablet Take 0.5 Tabs by mouth daily as needed. Indications: Edema (Patient taking differently: Take  by mouth daily as needed.)    COQ10, UBIQUINOL, PO Take 100 mg by mouth two (2) times a day.  CHOLECALCIFEROL, VITAMIN D3, (VITAMIN D3 PO) Take  by mouth daily.  levocetirizine (XYZAL) 5 mg tablet Take 1 Tab by mouth daily.  fluticasone (FLONASE) 50 mcg/actuation nasal spray 2 Sprays by Both Nostrils route daily. No current facility-administered medications for this visit.         Varsha Schwartz NP  [facility] [address] [facility] [address] [facility] [address]  [facility]  [phone]

## 2021-03-16 NOTE — PATIENT INSTRUCTIONS
Please change your valsartan to 160mg once daily in the morning   Continue your carvedilol twice daily

## 2021-03-16 NOTE — PROGRESS NOTES
Reason for consult: establish new cardiovascular care  Requesting physician: Dr. Chery Black     HPI: Yuriy Rivera, a 30y.o. year-old who presents for evaluation of post partum cardiomyopathy. She had been seeing Dr. Debra Mckeon at [facility] [place] until his halfway  She saw Dr. Rei Borrego a few times after that but is here to transfer her care today  She has had PPCM since 2014, she delivered in September 2013 and 6 months later developed PPCM  Her lowest EF was 25% in the past and she has an ICD followed by Dr. Kisha Whitley   She is feeling good    No chest pain or palpitations  No dyspnea with exertion   No PND or orthopnea  No dizziness   No LE edema   She walks a few times/week - 40 min - 1 hour   She admits that she forgets her night time medications about once/week       Overall despite her cardiomyopathy she is well compensated and would be a NYHA class I-II. She is not having any worsening of her symptoms or suggestions of volume overload. So at this point I think we will continue her on her current medications. We had a essie discussion today about the risks of repeat pregnancy. She is not actively trying to get pregnant but is not ruling it completely out either. She is due for repeat echo and I will take a look at her prior 1 at her request because of the discrepancy between the machine estimate and physician over read on her EF. We reviewed the symptoms of worsening heart failure and when to call    **After her office visit labs received dated 12/29/20  CMP ok, , HDL 43, TG 83, a1c 5.3%, Vit D 28, CBC ok     Assessment/Plan:  1.   Post partum cardiomyopathy - EF 40-45% by last TTE in 8/20, will repeat TTE now to reassess, s/p AICD followed by Dr. Kisha Whitley   -will change valsartan to 160mg once daily to help with compliance, will continue coreg 25mg BID for now   -takes chlorthalidone as needed for swelling, rarely takes this    -advised her to avoid pregnancy due to worsening PPCM with another pregnancy  -will request her recent labs from her PCP  -she will follow up here again in 4 months   2. Atrial Tachycardia - noted on last device check, on coreg      Echo 8/20 - EF 40-45%, no WMA, mild TR     Fam Hx: no early CAD  Soc Hx: no tobacco use, occ etoh use, no drug use     She  has a past medical history of AICD (automatic cardioverter/defibrillator) present, Anemia NEC, Cardiomyopathy, peripartum, postpartum (2/10/2015), Class 1 obesity due to excess calories without serious comorbidity with body mass index (BMI) of 30.0 to 30.9 in adult (9/20/2018), Contact dermatitis and other eczema, due to unspecified cause, Hypertension, Other fatigue (4/79/4214), and Systolic congestive heart failure with reduced left ventricular function, NYHA class 2 ([facility] [address] (3/29/2018). She also has no past medical history of Abnormal Pap smear, Abuse, Acquired hypothyroidism, Asthma, Chlamydia, Complication of anesthesia, Diabetes mellitus, Genital herpes, unspecified, Gonorrhea, Heart abnormalities, Herpes gestationis, Herpes simplex without mention of complication, History of abuse, Human immunodeficiency virus (HIV) disease ([facility] [address] OTHER MEDICAL, Infertility, Kidney disease, Liver disease, Phlebitis and thrombophlebitis of unspecified site, Postpartum depression, Psychiatric problem, Rhesus isoimmunization unspecified as to episode of care in pregnancy, Sickle-cell disease, unspecified, Syphilis, Systemic lupus erythematosus ([facility] [address] Trauma, Unspecified breast disorder, Unspecified diseases of blood and blood-forming organs, or Unspecified epilepsy without mention of intractable epilepsy. Cardiovascular ROS: no chest pain or dyspnea on exertion  Respiratory ROS: no cough, shortness of breath, or wheezing  Neurological ROS: no TIA or stroke symptoms  All other systems negative except as above. PE  There were no vitals filed for this visit. There is no height or weight on file to calculate BMI.    /80   General appearance - alert, well appearing, and in no distress  Mental status - affect appropriate to mood  Eyes - sclera anicteric, moist mucous membranes  Neck - supple  Lymphatics - not assessed  Chest - clear to auscultation, no wheezes, rales or rhonchi  Heart - normal rate, regular rhythm, normal S1, S2, no murmurs, rubs, clicks or gallops  Abdomen - soft, nontender, nondistended  Back exam - full range of motion, no tenderness  Neurological - cranial nerves II through XII grossly intact, no focal deficit  Musculoskeletal - no muscular tenderness noted, normal strength  Extremities - peripheral pulses normal, no pedal edema  Skin - normal coloration  no rashes    12 lead ECG: NSR, non-specific ST-T wave changes     Recent Labs:  Lab Results   Component Value Date/Time    Cholesterol, total 139 06/02/2016 08:19 AM    HDL Cholesterol 45 06/02/2016 08:19 AM    LDL, calculated 76 06/02/2016 08:19 AM    Triglyceride 89 06/02/2016 08:19 AM     Lab Results   Component Value Date/Time    Creatinine 0.99 09/20/2018 12:00 AM     Lab Results   Component Value Date/Time    BUN 10 09/20/2018 12:00 AM     Lab Results   Component Value Date/Time    Potassium 3.9 09/20/2018 12:00 AM     Lab Results   Component Value Date/Time    Hemoglobin A1c 5.6 06/02/2016 08:19 AM     Lab Results   Component Value Date/Time    HGB 12.2 09/16/2015 08:29 PM     Lab Results   Component Value Date/Time    PLATELET 048 40/88/4520 08:29 PM       Reviewed:  Past Medical History:   Diagnosis Date    AICD (automatic cardioverter/defibrillator) present     Anemia NEC     Cardiomyopathy, peripartum, postpartum 2/10/2015    ECHO 2013: EF 50%, mild MR/TR  ECHO 1/2015: EF 30-35% ECHO 5/2015:LVD, EF 25-30%, [address] ECHO 2016: EF 50% ECHO 2017: EF 40-45%, mild DHK, LVH, RV mild dilated ECHO 1/2018: EF 50%, no WMA, wall thickness < no (.8-.9 cm), mil MR/TR,   CARDIAC MRI 4/2016: LVEF 50%, mild GHK, RVEF 60%   AICD 2015: [employer]     Class 1 obesity due to excess calories without serious comorbidity with body mass index (BMI) of 30.0 to 30.9 in adult 9/20/2018    Contact dermatitis and other eczema, due to unspecified cause     hives    Hypertension     Other fatigue 0/31/5960    Systolic congestive heart failure with reduced left ventricular function, NYHA class 2 ([facility] [address] 3/29/2018     Social History     Tobacco Use   Smoking Status Never Smoker   Smokeless Tobacco Never Used     Social History     Substance and Sexual Activity   Alcohol Use No     Allergies   Allergen Reactions    Other Medication Hives     Allergic, to dust mites, molds    Pollen Extracts Hives       Current Outpatient Medications   Medication Sig    chlorthalidone (HYGROTON) 25 mg tablet Take 0.5 Tabs by mouth daily as needed (swelling).  valsartan (DIOVAN) 160 mg tablet Take 1 Tab by mouth daily. Indications: chronic heart failure    carvediloL (COREG) 25 mg tablet TAKE 1 TABLET BY MOUTH TWICE A DAY WITH MEALS    COQ10, UBIQUINOL, PO Take 100 mg by mouth two (2) times a day.  CHOLECALCIFEROL, VITAMIN D3, (VITAMIN D3 PO) Take  by mouth daily.  levocetirizine (XYZAL) 5 mg tablet Take 1 Tab by mouth daily.  fluticasone (FLONASE) 50 mcg/actuation nasal spray 2 Sprays by Both Nostrils route daily. No current facility-administered medications for this visit.         Imani Islas MD  [facility] [place] [address], [address] [place] [address] [address]  [facility]  [phone]

## 2021-03-22 ENCOUNTER — TRANSCRIBE ORDER (OUTPATIENT)
Dept: SCHEDULING | Age: 36
End: 2021-03-22

## 2021-03-22 DIAGNOSIS — N63.10 MASS OF RIGHT BREAST: Primary | ICD-10-CM

## 2021-03-30 ENCOUNTER — TRANSCRIBE ORDER (OUTPATIENT)
Dept: SCHEDULING | Age: 36
End: 2021-03-30

## 2021-03-30 DIAGNOSIS — R92.8 ABNORMAL MAMMOGRAM: Primary | ICD-10-CM

## 2021-04-02 ENCOUNTER — ANCILLARY PROCEDURE (OUTPATIENT)
Dept: CARDIOLOGY CLINIC | Age: 36
End: 2021-04-02
Payer: COMMERCIAL

## 2021-04-02 ENCOUNTER — HOSPITAL ENCOUNTER (OUTPATIENT)
Dept: MAMMOGRAPHY | Age: 36
Discharge: HOME | End: 2021-04-02
Payer: COMMERCIAL

## 2021-04-02 VITALS — HEIGHT: 64 IN | BODY MASS INDEX: 33.29 KG/M2 | WEIGHT: 195 LBS

## 2021-04-02 DIAGNOSIS — I10 ESSENTIAL HYPERTENSION: ICD-10-CM

## 2021-04-02 DIAGNOSIS — Z95.810 ICD (IMPLANTABLE CARDIOVERTER-DEFIBRILLATOR) IN PLACE: ICD-10-CM

## 2021-04-02 DIAGNOSIS — I50.22 SYSTOLIC CHF, CHRONIC (HCC): ICD-10-CM

## 2021-04-02 DIAGNOSIS — R92.8 ABNORMAL MAMMOGRAM: ICD-10-CM

## 2021-04-02 DIAGNOSIS — N63.10 BREAST MASS, RIGHT: ICD-10-CM

## 2021-04-02 DIAGNOSIS — N63.10 MASS OF RIGHT BREAST: ICD-10-CM

## 2021-04-02 PROCEDURE — 76642 ULTRASOUND BREAST LIMITED: CPT

## 2021-04-02 PROCEDURE — 77066 DX MAMMO INCL CAD BI: CPT

## 2021-04-02 PROCEDURE — 93306 TTE W/DOPPLER COMPLETE: CPT | Performed by: INTERNAL MEDICINE

## 2021-04-06 LAB
ECHO AO ASC DIAM: 2.7 CM
ECHO AO ROOT DIAM: 2.98 CM
ECHO AV AREA PEAK VELOCITY: 2.27 CM2
ECHO AV AREA VTI: 2.58 CM2
ECHO AV AREA/BSA PEAK VELOCITY: 1.2 CM2/M2
ECHO AV AREA/BSA VTI: 1.3 CM2/M2
ECHO AV MEAN GRADIENT: 3.02 MMHG
ECHO AV PEAK GRADIENT: 5.39 MMHG
ECHO AV PEAK VELOCITY: 116.05 CM/S
ECHO AV VTI: 21.71 CM
ECHO IVC PROX: 1.49 CM
ECHO LA AREA 4C: 14.97 CM2
ECHO LA MAJOR AXIS: 3.1 CM
ECHO LA MINOR AXIS: 1.6 CM
ECHO LA VOL 2C: 41.85 ML (ref 22–52)
ECHO LA VOL 4C: 35.81 ML (ref 22–52)
ECHO LA VOL BP: 42.83 ML (ref 22–52)
ECHO LA VOL/BSA BIPLANE: 22.13 ML/M2 (ref 16–28)
ECHO LA VOLUME INDEX A2C: 21.63 ML/M2 (ref 16–28)
ECHO LA VOLUME INDEX A4C: 18.51 ML/M2 (ref 16–28)
ECHO LV E' LATERAL VELOCITY: 8.97 CM/S
ECHO LV E' SEPTAL VELOCITY: 6.94 CM/S
ECHO LV EDV A2C: 87.86 ML
ECHO LV EDV A4C: 71.37 ML
ECHO LV EDV BP: 79.44 ML (ref 56–104)
ECHO LV EDV INDEX A4C: 36.9 ML/M2
ECHO LV EDV INDEX BP: 41.1 ML/M2
ECHO LV EDV NDEX A2C: 45.4 ML/M2
ECHO LV EJECTION FRACTION 3D: 52.9 PERCENT
ECHO LV EJECTION FRACTION A2C: 62 PERCENT
ECHO LV EJECTION FRACTION A4C: 50 PERCENT
ECHO LV EJECTION FRACTION BIPLANE: 55 PERCENT (ref 55–100)
ECHO LV ESV A2C: 33.32 ML
ECHO LV ESV A4C: 35.44 ML
ECHO LV ESV BP: 35.77 ML (ref 19–49)
ECHO LV ESV INDEX A2C: 17.2 ML/M2
ECHO LV ESV INDEX A4C: 18.3 ML/M2
ECHO LV ESV INDEX BP: 18.5 ML/M2
ECHO LV INTERNAL DIMENSION DIASTOLIC: 4.81 CM (ref 3.9–5.3)
ECHO LV INTERNAL DIMENSION SYSTOLIC: 3.49 CM
ECHO LV IVSD: 1.04 CM (ref 0.6–0.9)
ECHO LV MASS 2D: 189.7 G (ref 67–162)
ECHO LV MASS INDEX 2D: 98 G/M2 (ref 43–95)
ECHO LV POSTERIOR WALL DIASTOLIC: 1.12 CM (ref 0.6–0.9)
ECHO LVOT DIAM: 2.17 CM
ECHO LVOT PEAK GRADIENT: 2.04 MMHG
ECHO LVOT PEAK VELOCITY: 71.43 CM/S
ECHO LVOT SV: 56 ML
ECHO LVOT VTI: 15.17 CM
ECHO MV A VELOCITY: 51.27 CM/S
ECHO MV E DECELERATION TIME (DT): 167.21 MS
ECHO MV E VELOCITY: 61.19 CM/S
ECHO MV E/A RATIO: 1.19
ECHO MV E/E' LATERAL: 6.82
ECHO MV E/E' RATIO (AVERAGED): 7.82
ECHO MV E/E' SEPTAL: 8.82
ECHO PV MAX VELOCITY: 74.44 CM/S
ECHO PV PEAK INSTANTANEOUS GRADIENT SYSTOLIC: 2.22 MMHG
ECHO PV REGURGITANT MAX VELOCITY: 115.09 CM/S
ECHO RV TAPSE: 2.21 CM (ref 1.5–2)
ECHO TV REGURGITANT MAX VELOCITY: 222.38 CM/S
ECHO TV REGURGITANT PEAK GRADIENT: 19.78 MMHG
LA VOL DISK BP: 40.16 ML (ref 22–52)

## 2021-04-14 ENCOUNTER — CLINICAL SUPPORT (OUTPATIENT)
Dept: CARDIOLOGY CLINIC | Age: 36
End: 2021-04-14

## 2021-04-14 ENCOUNTER — OFFICE VISIT (OUTPATIENT)
Dept: CARDIOLOGY CLINIC | Age: 36
End: 2021-04-14
Payer: COMMERCIAL

## 2021-04-14 VITALS
DIASTOLIC BLOOD PRESSURE: 88 MMHG | OXYGEN SATURATION: 99 % | BODY MASS INDEX: 33.29 KG/M2 | RESPIRATION RATE: 18 BRPM | HEIGHT: 64 IN | WEIGHT: 195 LBS | HEART RATE: 89 BPM | SYSTOLIC BLOOD PRESSURE: 118 MMHG

## 2021-04-14 DIAGNOSIS — I10 ESSENTIAL HYPERTENSION: ICD-10-CM

## 2021-04-14 DIAGNOSIS — I50.22 SYSTOLIC CHF, CHRONIC (HCC): ICD-10-CM

## 2021-04-14 DIAGNOSIS — Z95.810 ICD (IMPLANTABLE CARDIOVERTER-DEFIBRILLATOR) IN PLACE: ICD-10-CM

## 2021-04-14 DIAGNOSIS — Z95.810 ICD (IMPLANTABLE CARDIOVERTER-DEFIBRILLATOR) IN PLACE: Primary | ICD-10-CM

## 2021-04-14 PROCEDURE — 99214 OFFICE O/P EST MOD 30 MIN: CPT | Performed by: NURSE PRACTITIONER

## 2021-04-14 PROCEDURE — 93282 PRGRMG EVAL IMPLANTABLE DFB: CPT | Performed by: INTERNAL MEDICINE

## 2021-04-14 NOTE — PROGRESS NOTES
Chief Complaint   Patient presents with    Pacemaker Check     Annual follow up - denies cardiac sx      1. Have you been to the ER, urgent care clinic since your last visit? Hospitalized since your last visit? No     2. Have you seen or consulted any other health care providers outside of the [address] [place] [address] since your last visit? Include any pap smears or colon screening.   Yes pcp     Patient had first pfizer vaccine on 3/24/21  - does not have doc on her

## 2021-04-14 NOTE — PROGRESS NOTES
ELECTROPHYSIOLOGY        Subjective:      Enrico Billy is a 30 y.o. female is here for EP follow up. The patient denies chest pain/ shortness of breath, orthopnea, PND, LE edema, palpitations, syncope, presyncope or fatigue.        Patient Active Problem List    Diagnosis Date Noted    Class 1 obesity due to excess calories without serious comorbidity with body mass index (BMI) of 30.0 to 30.9 in adult 2018    Other fatigue     Systolic CHF, chronic ([address]     Systolic congestive heart failure with reduced left ventricular function, NYHA class 2 ([address] 2018    HTN (hypertension) 2015    ICD (implantable cardioverter-defibrillator) in place 2015    Cardiomyopathy, peripartum, postpartum--resolved on Rx. 02/10/2015    Vitamin D deficiency 2011    Snoring 2011      Lizandro Figueroa MD  Past Medical History:   Diagnosis Date    AICD (automatic cardioverter/defibrillator) present     Anemia NEC     Cardiomyopathy, peripartum, postpartum 2/10/2015    ECHO 2013: EF 50%, mild MR/TR  ECHO 2015: EF 30-35% ECHO 2015:LVD, EF 25-30%, [address] ECHO 2016: EF 50% ECHO 2017: EF 40-45%, mild DHK, LVH, RV mild dilated ECHO 2018: EF 50%, no WMA, wall thickness < no (.8-.9 cm), mil MR/TR,   CARDIAC MRI 2016: LVEF 50%, mild GHK, RVEF 60%   AICD : [employer]     Class 1 obesity due to excess calories without serious comorbidity with body mass index (BMI) of 30.0 to 30.9 in adult 2018    Contact dermatitis and other eczema, due to unspecified cause     hives    Hypertension     Other fatigue     Systolic congestive heart failure with reduced left ventricular function, NYHA class 2 ([address] 3/29/2018      Past Surgical History:   Procedure Laterality Date    HX GYN  13        HX IMPLANTABLE CARDIOVERTER DEFIBRILLATOR  2015    [employer] #[id number]     Allergies   Allergen Reactions    Other Medication Hives     Allergic, to dust mites, molds    Pollen Extracts Hives      Family History   Problem Relation Age of Onset    Hypertension Mother     Hypertension Father     Diabetes Maternal Grandmother     Heart Disease Maternal Grandmother     Arthritis-osteo Maternal Grandfather     Asthma Brother     Arthritis-osteo Paternal Grandmother     Arthritis-osteo Paternal Grandfather     negative for cardiac disease  Social History     Socioeconomic History    Marital status:      Spouse name: Not on file    Number of children: 0    Years of education: Not on file    Highest education level: Not on file   Occupational History    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  valsartan (DIOVAN) 160 mg tablet Take 1 Tab by mouth daily. Indications: chronic heart failure    carvediloL (COREG) 25 mg tablet TAKE 1 TABLET BY MOUTH TWICE A DAY WITH MEALS    COQ10, UBIQUINOL, PO Take 100 mg by mouth two (2) times a day.  CHOLECALCIFEROL, VITAMIN D3, (VITAMIN D3 PO) Take 5,000 Units by mouth daily.  levocetirizine (XYZAL) 5 mg tablet Take 1 Tab by mouth daily.  fluticasone (FLONASE) 50 mcg/actuation nasal spray 2 Sprays by Both Nostrils route daily. No current facility-administered medications for this visit.        Vitals:    04/14/21 1003   BP: (!) 140/70   Pulse: 89   Resp: 18   SpO2: 99%   Weight: 195 lb (88.5 kg)   Height: 5' 4\" (1.626 m)       I have reviewed the nurses notes, vitals, problem list, allergy list, medical history, family, social history and medications. Review of Symptoms:    General: Pt denies excessive weight gain or loss. Pt is able to conduct ADL's  HEENT: Denies blurred vision, headaches, epistaxis and difficulty swallowing. Respiratory: Denies shortness of breath, MOULTON, wheezing or stridor. Cardiovascular: Denies precordial pain, palpitations, edema or PND  Gastrointestinal: Denies poor appetite, indigestion, abdominal pain or blood in stool  Urinary: Denies dysuria, pyuria  Musculoskeletal: Denies pain or swelling from muscles or joints  Neurologic: Denies tremor, paresthesias, or sensory motor disturbance  Skin: Denies rash, itching or texture change. Psych: Denies depression      Physical Exam:      General: Well developed, in no acute distress. HEENT: Eyes - PERRL  Heart:  Normal S1/S2 negative S3 or S4. Regular, no murmur  Respiratory: Clear bilaterally x 4, no wheezing or rales  Extremities:  No edema, no cyanosis. Musculoskeletal: No clubbing  Neuro: A&Ox3, speech clear  Skin: No visible rashes or lesions. Non diaphoretic.  No visible ulcers  Vascular: 2+ pulses symmetric in all extremities  Psych - judgement intact and orientation is wnl       Cardiographics      Results for orders placed or performed during the hospital encounter of 09/16/15   EKG, 12 LEAD, INITIAL   Result Value Ref Range    Ventricular Rate 73 BPM    Atrial Rate 73 BPM    P-R Interval 138 ms    QRS Duration 82 ms    Q-T Interval 448 ms    QTC Calculation (Bezet) 493 ms    Calculated P Axis 51 degrees    Calculated R Axis 44 degrees    Calculated T Axis 2 degrees    Diagnosis       Normal sinus rhythm  Normal ekg  Confirmed by Christy Carrillo ([id number]) on 9/17/2015 7:29:30 AM           Lab Results   Component Value Date/Time    WBC 9.5 09/16/2015 08:29 PM    HGB 12.2 09/16/2015 08:29 PM    HCT 36.4 09/16/2015 08:29 PM    PLATELET 986 16/20/7482 08:29 PM    MCV 92.9 09/16/2015 08:29 PM      Lab Results   Component Value Date/Time    Sodium 139 09/20/2018 12:00 AM    Potassium 3.9 09/20/2018 12:00 AM    Chloride 100 09/20/2018 12:00 AM    CO2 23 09/20/2018 12:00 AM    Anion gap 9 09/16/2015 08:29 PM    Glucose 78 09/20/2018 12:00 AM    BUN 10 09/20/2018 12:00 AM    Creatinine 0.99 09/20/2018 12:00 AM    BUN/Creatinine ratio 10 09/20/2018 12:00 AM    GFR est AA 88 09/20/2018 12:00 AM    GFR est non-AA 76 09/20/2018 12:00 AM    Calcium 9.0 09/20/2018 12:00 AM    Bilirubin, total 0.3 09/16/2015 08:29 PM    Alk. phosphatase 57 09/16/2015 08:29 PM    Protein, total 7.9 09/16/2015 08:29 PM    Albumin 3.5 09/16/2015 08:29 PM    Globulin 4.4 (H) 09/16/2015 08:29 PM    A-G Ratio 0.8 (L) 09/16/2015 08:29 PM    ALT (SGPT) 18 09/16/2015 08:29 PM      Lab Results   Component Value Date/Time    TSH 1.330 09/20/2018 12:00 AM      echo 4/2/21-  · LV: Calculated LVEF is 53%. 3D quantification used to measure ejection fraction. Normal cavity size and diastolic function. Upper normal wall thickness. Low normal systolic function. Wall motion: normal.  · RV: Pacer/ICD present. Assessment:           ICD-10-CM ICD-9-CM    1. Cardiomyopathy, peripartum, postpartum--resolved on Rx.  O90.3 674.54    2. Systolic CHF, chronic (HCC)  I50.22 428.22      428.0    3. ICD (implantable cardioverter-defibrillator) in place  Z95.810 V45.02    4. Postpartum cardiomyopathy  O90.3 674.54    5. Essential hypertension  I10 401.9      No orders of the defined types were placed in this encounter. Plan:      Ms. Carrie Asencio is here for annual follow up ICD/Cardiomyopathy management. Her ICD was placed in 2015 for post/iban partum cardiomyopathy. Device interrogation today demonstrates normal functioning with 0% pacing. ST episodes noted. She is normotensive. Noted to have 16  AT/ST with one episode of AF 14 sec. Will continue to monitor remotely. Discussed modifiable risk factors for AF. She denies cardiac complaints.     During this visit,  the patient and I had a comprehensive discussion of device management using principles of shared decision making. we reviewed device therapy, including the potential risks and benefits of device management. These risks include death, myocardial infarction, stroke, cardiac perforation, vascular injury, injury, pacing induced cardiomyopathy, inappropriate shocks (defibrillator) and other less severe complications. The patient demonstrated a clear understanding of these issues during out discussion. Our plans, determined together after thorough consideration, are outlined else where in this note. She is enrolled in remote monitoring and I will see her back in 1 year. Addressed all patient questions and concerns at this visit. Continue medical management for cardiomyopathy. Discussed side effects, efficacy and good medication compliance with pt re: bb/arb. Thank you for allowing me to participate in Giovanny Patricio 's care.       Janis Charles NP

## 2021-06-12 ENCOUNTER — HOSPITAL ENCOUNTER (EMERGENCY)
Age: 36
Discharge: HOME | End: 2021-06-12
Attending: EMERGENCY MEDICINE
Payer: COMMERCIAL

## 2021-06-12 VITALS
BODY MASS INDEX: 32.9 KG/M2 | HEIGHT: 64 IN | WEIGHT: 192.68 LBS | SYSTOLIC BLOOD PRESSURE: 116 MMHG | DIASTOLIC BLOOD PRESSURE: 65 MMHG | OXYGEN SATURATION: 99 % | RESPIRATION RATE: 17 BRPM | TEMPERATURE: 98.7 F | HEART RATE: 99 BPM

## 2021-06-12 DIAGNOSIS — Z20.822 PERSON UNDER INVESTIGATION FOR COVID-19: ICD-10-CM

## 2021-06-12 DIAGNOSIS — J06.9 ACUTE URI: Primary | ICD-10-CM

## 2021-06-12 LAB — SARS-COV-2, COV2: NORMAL

## 2021-06-12 PROCEDURE — 99283 EMERGENCY DEPT VISIT LOW MDM: CPT

## 2021-06-12 PROCEDURE — U0005 INFEC AGEN DETEC AMPLI PROBE: HCPCS

## 2021-06-12 PROCEDURE — 74011250637 HC RX REV CODE- 250/637: Performed by: PHYSICIAN ASSISTANT

## 2021-06-12 RX ORDER — ACETAMINOPHEN 325 MG/1
650 TABLET ORAL
Status: COMPLETED | OUTPATIENT
Start: 2021-06-12 | End: 2021-06-12

## 2021-06-12 RX ADMIN — ACETAMINOPHEN 650 MG: 325 TABLET ORAL at 10:41

## 2021-06-12 NOTE — DISCHARGE INSTRUCTIONS
It was a pleasure taking care of you in our Emergency Department today. We know that when you come to [facility], you are entrusting us with your health, comfort, and safety. Our physicians and nurses honor that trust, and truly appreciate the opportunity to care for you and your loved ones. We also value your feedback. If you receive a survey about your Emergency Department experience today, please fill it out. We care about our patients' feedback, and we listen to what you have to say. Thank you!

## 2021-06-12 NOTE — ED NOTES
Discharge instructions reviewed with and given to pt, no changes, no obvious distress noted, pt ambulatory on own accord.

## 2021-06-12 NOTE — LETTER
[address] 
[facility] EMERGENCY DEPT 
[address] [place] [address] 
[phone] Work/School Note Date: 6/12/2021 To Whom It May concern: 
 
Nelson Goldberg was seen and treated today in the emergency room by the following provider(s): 
Attending Provider: Mary Nash DO Physician Assistant: Franny Ojeda. In light of the current COVID-19 pandemic, please excuse your employee from work under the circumstance below: 
 
1) If patient was exposed but without symptoms, he/she should self-isolate at home for 14 days from day of exposure. 2) If patient has symptoms concerning for COVID-19, such as fever, cough, shortness of breath, regardless if patient received testing or not, patient should self-isolate at home until 3 days after symptoms have resolved AND 7 days after symptoms first started, whichever is later. 3) The patient has a pending COVID-19 test that should result in the next 2-3 days. Thank you. Sincerely, Franny Padilla

## 2021-06-12 NOTE — ED PROVIDER NOTES
EMERGENCY DEPARTMENT HISTORY AND PHYSICAL EXAM      Date: 6/12/2021  Patient Name: Jacobo Arce    History of Presenting Illness     Chief Complaint   Patient presents with    Fever     Ambulatory into the ED with c/o HA, chills, fever x last night - recently treated for strep. No obvious distress noted.  Chills       History Provided By: Patient    HPI: Jacobo Arce, 30 y.o. female with significant PMHx for HTN, presents by POV to the ED with cc of fever, chills, and generalized malaise. The patient was treated with Amoxil 3 weeks ago for strep. Her ST returned mid-week and she went to [facility] and was started on Keflex. She denies cough, SOB, otalgia, loss of sensation of smell or taste. She denies known sick contacts. She is vaccinated for COVID-19 and denies any known sick contacts. She is not taking any over the counter medications for symptomatic relief. There are no other complaints, changes, or physical findings at this time. Social Hx: Tobacco (denies), EtOH (social), Illicit drug use (denies)     PCP: Leigh Brandt MD    No current facility-administered medications on file prior to encounter. Current Outpatient Medications on File Prior to Encounter   Medication Sig Dispense Refill    chlorthalidone (HYGROTON) 25 mg tablet Take 0.5 Tabs by mouth daily as needed (swelling). 45 Tab 1    valsartan (DIOVAN) 160 mg tablet Take 1 Tab by mouth daily. Indications: chronic heart failure 90 Tab 3    carvediloL (COREG) 25 mg tablet TAKE 1 TABLET BY MOUTH TWICE A DAY WITH MEALS 180 Tab 1    COQ10, UBIQUINOL, PO Take 100 mg by mouth two (2) times a day.  CHOLECALCIFEROL, VITAMIN D3, (VITAMIN D3 PO) Take 5,000 Units by mouth daily.  levocetirizine (XYZAL) 5 mg tablet Take 1 Tab by mouth daily. 30 Tab 5    fluticasone (FLONASE) 50 mcg/actuation nasal spray 2 Sprays by Both Nostrils route daily.  1 Bottle 5       Past History     Past Medical History:  Past Medical History: Diagnosis Date    AICD (automatic cardioverter/defibrillator) present     Anemia NEC     Cardiomyopathy, peripartum, postpartum 2/10/2015    ECHO : EF 50%, mild MR/TR  ECHO 2015: EF 30-35% ECHO 2015:LVD, EF 25-30%, [address] ECHO 2016: EF 50% ECHO 2017: EF 40-45%, mild DHK, LVH, RV mild dilated ECHO 2018: EF 50%, no WMA, wall thickness < no (.8-.9 cm), mil MR/TR,   CARDIAC MRI 2016: LVEF 50%, mild GHK, RVEF 60%   AICD : [employer]otronik     Class 1 obesity due to excess calories without serious comorbidity with body mass index (BMI) of 30.0 to 30.9 in adult 2018    Contact dermatitis and other eczema, due to unspecified cause     hives    Hypertension     Other fatigue 3/27/3278    Systolic congestive heart failure with reduced left ventricular function, NYHA class 2 ([address] 3/29/2018       Past Surgical History:  Past Surgical History:   Procedure Laterality Date    HX GYN  13        HX IMPLANTABLE CARDIOVERTER DEFIBRILLATOR  2015    [employer] #[id number]       Family History:  Family History   Problem Relation Age of Onset    Hypertension Mother     Hypertension Father     Diabetes Maternal Grandmother     Heart Disease Maternal Grandmother     Arthritis-osteo Maternal Grandfather     Asthma Brother     Arthritis-osteo Paternal Grandmother     Arthritis-osteo Paternal Grandfather        Social History:  Social History     Tobacco Use    Smoking status: Never Smoker    Smokeless tobacco: Never Used   Vaping Use    Vaping Use: Never used   Substance Use Topics    Alcohol use: Yes     Comment: ocass wine or liquor     Drug use: No     Comment: denies        Allergies: Allergies   Allergen Reactions    Other Medication Hives     Allergic, to dust mites, molds    Pollen Extracts Hives         Review of Systems   Review of Systems   Constitutional: Positive for chills and fever. Negative for diaphoresis. HENT: Positive for congestion and rhinorrhea.  Negative for ear pain and sore throat. Respiratory: Negative for cough and shortness of breath. Cardiovascular: Negative for chest pain. Gastrointestinal: Negative for abdominal pain, constipation, diarrhea, nausea and vomiting. Genitourinary: Negative for difficulty urinating, dysuria, frequency and hematuria. Musculoskeletal: Negative for arthralgias and myalgias. Neurological: Negative for headaches. All other systems reviewed and are negative. Physical Exam   Physical Exam  Vitals and nursing note reviewed. Constitutional:       General: She is not in acute distress. Appearance: She is well-developed. She is not diaphoretic. Comments: 30 y.o. -American female    HENT:      Head: Normocephalic and atraumatic. Right Ear: Tympanic membrane and ear canal normal.      Left Ear: Tympanic membrane and ear canal normal.      Nose: Congestion present. Eyes:      General:         Right eye: No discharge. Left eye: No discharge. Conjunctiva/sclera: Conjunctivae normal.   Cardiovascular:      Rate and Rhythm: Normal rate and regular rhythm. Heart sounds: Normal heart sounds. No murmur heard. Pulmonary:      Effort: Pulmonary effort is normal. No respiratory distress. Breath sounds: Normal breath sounds. Musculoskeletal:      Cervical back: Normal range of motion and neck supple. Skin:     General: Skin is warm and dry. Neurological:      Mental Status: She is alert and oriented to person, place, and time. Psychiatric:         Behavior: Behavior normal.         Diagnostic Study Results     Labs - COVID 19 Test pending at discharge    Radiologic Studies - none    Medical Decision Making   I am the first provider for this patient. I reviewed the vital signs, available nursing notes, past medical history, past surgical history, family history and social history. Vital Signs-Reviewed the patient's vital signs.   Patient Vitals for the past 12 hrs:   Temp Pulse Resp BP SpO2   06/12/21 1103 98.7 °F (37.1 °C) 99 17 116/65 99 %   06/12/21 1011 98.6 °F (37 °C) (!) 108 12 128/75 99 %       Records Reviewed: Nursing Notes    Provider Notes (Medical Decision Making):   Patient presents the ED with stable vital signs for upper respiratory complaints. Differential diagnosis include viral illness, bronchitis, pneumonia, URI, seasonal allergies. The patient is already on antibiotics. COVID-19 testing was done. She should follow with primary care medicine if not improving. ED Course:   Initial assessment performed. The patients presenting problems have been discussed, and they are in agreement with the care plan formulated and outlined with them. I have encouraged them to ask questions as they arise throughout their visit. Critical Care Time: None    Disposition:  DISCHARGE NOTE:  11:05 AM  The pt is ready for discharge. The pt's signs, symptoms, diagnosis, and discharge instructions have been discussed and pt has conveyed their understanding. The pt is to follow up as recommended or return to ER should their symptoms worsen. Plan has been discussed and pt is in agreement. PLAN:  1. Discharge Medication List as of 6/12/2021 10:56 AM        2. Follow-up Information     Follow up With Specialties Details Why Contact Info    Maite Russell MD Family Medicine In 1 week As needed, If not improved [address] [address]  [phone]      [facility] EMERGENCY DEPT Emergency Medicine  If symptoms worsen [address]  [address] [facility]  [phone]        Return to ED if worse     Diagnosis     Clinical Impression:   1. Acute URI    2. Person under investigation for COVID-19          Please note that this dictation was completed with "[employer] the [employer] voice recognition software. Quite often unanticipated grammatical, syntax, homophones, and other interpretive errors are inadvertently transcribed by the computer software.  Please disregards these errors. Please excuse any errors that have escaped final proofreading.

## 2021-06-13 ENCOUNTER — PATIENT OUTREACH (OUTPATIENT)
Dept: CASE MANAGEMENT | Age: 36
End: 2021-06-13

## 2021-06-13 NOTE — PROGRESS NOTES
Patient contacted regarding COVID-19 risk. Discussed COVID-19 related testing which was available at this time. Test results were pending. Patient informed of results, if available? no.     LPN Care Coordinator contacted the patient by telephone to perform post discharge assessment. Call within 2 business days of discharge: Yes Verified name and  with patient as identifiers. Provided introduction to self, and explanation of the CTN/ACM role, and reason for call due to risk factors for infection and/or exposure to COVID-19. Symptoms reviewed with patient who verbalized the following symptoms: no worsening symptoms      Due to no new or worsening symptoms encounter was not routed to provider for escalation. Discussed follow-up appointments. If no appointment was previously scheduled, appointment scheduling offered:  no. [facility] follow up appointment(s):   Future Appointments   Date Time Provider Tommy Sahu   2021  8:00 AM REMOTE_[facility] BS AMB   2021  3:40 PM MD CLAIRE Salvador  AMB   2022  8[facility]45 [facility]   2022  9:00 AM Amara Marquez, ANP [facility] BS AMB     Non-[facility] follow up appointment(s): n/a    Interventions to address risk factors: Obtained and reviewed discharge summary and/or continuity of care documents     Advance Care Planning:   Does patient have an Advance Directive: patient declined education. Educated patient about risk for severe COVID-19 due to risk factors according to CDC guidelines. LPN CC reviewed discharge instructions, medical action plan and red flag symptoms with the patient who verbalized understanding. Discussed COVID vaccination status: no. Education provided on COVID-19 vaccination as appropriate. Discussed exposure protocols and quarantine with CDC Guidelines.  Patient was given an opportunity to verbalize any questions and concerns and agrees to contact LPN CC or health care provider for questions related to their healthcare. Reviewed and educated patient on any new and changed medications related to discharge diagnosis     Was patient discharged with a pulse oximeter? no Discussed and confirmed pulse oximeter discharge instructions and when to notify provider or seek emergency care. LPN CC provided contact information. Plan for follow-up call in 5-7 days based on severity of symptoms and risk factors.

## 2021-06-15 LAB
SARS-COV-2, XPLCVT: NOT DETECTED
SOURCE, COVRS: NORMAL

## 2021-06-28 ENCOUNTER — PATIENT OUTREACH (OUTPATIENT)
Dept: CASE MANAGEMENT | Age: 36
End: 2021-06-28

## 2021-06-28 NOTE — PROGRESS NOTES
Patient resolved from Transition of Care episode on 06/28/21. ACM/CTN was unsuccessful at contacting this patient today. Patient/family was provided the following resources and education related to COVID-19 during the initial call:                         Signs, symptoms and red flags related to COVID-19            CDC exposure and quarantine guidelines            [facility] exposure contact - [phone]            Contact for their local Department of Health                 Patient has not had any additional ED or hospital visits. No further outreach scheduled with this CTN/ACM. Episode of Care resolved. Patient has this CTN/ACM contact information if future needs arise.

## 2021-07-16 ENCOUNTER — OFFICE VISIT (OUTPATIENT)
Dept: CARDIOLOGY CLINIC | Age: 36
End: 2021-07-16
Payer: COMMERCIAL

## 2021-07-16 DIAGNOSIS — Z95.810 ICD (IMPLANTABLE CARDIOVERTER-DEFIBRILLATOR) IN PLACE: Primary | ICD-10-CM

## 2021-07-16 PROCEDURE — 93296 REM INTERROG EVL PM/IDS: CPT | Performed by: INTERNAL MEDICINE

## 2021-07-16 PROCEDURE — 93295 DEV INTERROG REMOTE 1/2/MLT: CPT | Performed by: INTERNAL MEDICINE

## 2021-07-20 ENCOUNTER — OFFICE VISIT (OUTPATIENT)
Dept: CARDIOLOGY CLINIC | Age: 36
End: 2021-07-20
Payer: COMMERCIAL

## 2021-07-20 VITALS
DIASTOLIC BLOOD PRESSURE: 80 MMHG | HEART RATE: 80 BPM | SYSTOLIC BLOOD PRESSURE: 130 MMHG | RESPIRATION RATE: 14 BRPM | BODY MASS INDEX: 33.02 KG/M2 | OXYGEN SATURATION: 99 % | WEIGHT: 193.4 LBS | HEIGHT: 64 IN

## 2021-07-20 DIAGNOSIS — I10 ESSENTIAL HYPERTENSION: ICD-10-CM

## 2021-07-20 DIAGNOSIS — I50.22 SYSTOLIC CHF, CHRONIC (HCC): ICD-10-CM

## 2021-07-20 DIAGNOSIS — Z95.810 ICD (IMPLANTABLE CARDIOVERTER-DEFIBRILLATOR) IN PLACE: Primary | ICD-10-CM

## 2021-07-20 PROCEDURE — 99214 OFFICE O/P EST MOD 30 MIN: CPT | Performed by: INTERNAL MEDICINE

## 2021-07-20 NOTE — PROGRESS NOTES
Reason for consult: establish new cardiovascular care  Requesting physician: Dr. Maia Khan     HPI: Tika Sheth, a 30y.o. year-old who presents for evaluation of post partum cardiomyopathy. She hasnt needed her chlorthalidone, really doing well, no swelling, no pnd no orthopnea, no chest pain. CHF is stable, compensated, she is not walking as much as she should but doing more. Last EF 52% in April, looking good    She had been seeing Dr. Sisi Mtz at [facility] until his shelter  She saw Dr. Gladys Hayden a few times after that but is here to transfer her care today  She has had PPCM since 2014, she delivered in September 2013 and 6 months later developed PPCM  Her lowest EF was 25% in the past and she has an ICD followed by Dr. PLUMMER [facility]   She is feeling good    No chest pain or palpitations  No dyspnea with exertion   No PND or orthopnea  No dizziness   No LE edema   She walks a few times/week - 40 min - 1 hour   She admits that she forgets her night time medications about once/week       Overall despite her cardiomyopathy she is well compensated and would be a NYHA class I-II. She is not having any worsening of her symptoms or suggestions of volume overload. So at this point I think we will continue her on her current medications. We had a essie discussion today about the risks of repeat pregnancy. She is not actively trying to get pregnant but is not ruling it completely out either. We reviewed the symptoms of worsening heart failure and when to call    **After her office visit labs received dated 12/29/20  CMP ok, , HDL 43, TG 83, a1c 5.3%, Vit D 28, CBC ok     Assessment/Plan:  1. Post partum cardiomyopathy - EF 40-45%->53%  -s/p AICD followed by Dr. PLUMMER [facility]   -valsartan, coreg   -takes chlorthalidone as needed for swelling, not needing it now  2.   Atrial Tachycardia - noted on last device check, on coreg      Echo 4/21 Ef 53%   Echo 8/20 - EF 40-45%, no WMA, mild TR  Fam Hx: no early CAD  Soc Hx: no tobacco use, occ etoh use, no drug use     She  has a past medical history of AICD (automatic cardioverter/defibrillator) present, Anemia NEC, Cardiomyopathy, peripartum, postpartum (2/10/2015), Class 1 obesity due to excess calories without serious comorbidity with body mass index (BMI) of 30.0 to 30.9 in adult (9/20/2018), Contact dermatitis and other eczema, due to unspecified cause, Hypertension, Other fatigue (7/13/9047), and Systolic congestive heart failure with reduced left ventricular function, NYHA class 2 ([facility] (3/29/2018). She also has no past medical history of Abnormal Pap smear, Abuse, Acquired hypothyroidism, Asthma, Chlamydia, Complication of anesthesia, Diabetes mellitus, Genital herpes, unspecified, Gonorrhea, Heart abnormalities, Herpes gestationis, Herpes simplex without mention of complication, History of abuse, Human immunodeficiency virus (HIV) disease ([facility] OTHER MEDICAL, Infertility, Kidney disease, Liver disease, Phlebitis and thrombophlebitis of unspecified site, Postpartum depression, Psychiatric problem, Rhesus isoimmunization unspecified as to episode of care in pregnancy, Sickle-cell disease, unspecified, Syphilis, Systemic lupus erythematosus ([facility] Trauma, Unspecified breast disorder, Unspecified diseases of blood and blood-forming organs, or Unspecified epilepsy without mention of intractable epilepsy. Cardiovascular ROS: no chest pain or dyspnea on exertion  Respiratory ROS: no cough, shortness of breath, or wheezing  Neurological ROS: no TIA or stroke symptoms  All other systems negative except as above. PE  Vitals:    07/20/21 1537   BP: 130/80   Pulse: 80   Resp: 14   SpO2: 99%   Weight: 193 lb 6.4 oz (87.7 kg)   Height: 5' 4\" (1.626 m)    Body mass index is 33.2 kg/m².    /80   General appearance - alert, well appearing, and in no distress  Mental status - affect appropriate to mood  Eyes - sclera anicteric, moist mucous membranes  Neck - supple  Lymphatics - not assessed  Chest - clear to auscultation, no wheezes, rales or rhonchi  Heart - normal rate, regular rhythm, normal S1, S2, no murmurs, rubs, clicks or gallops  Abdomen - soft, nontender, nondistended  Back exam - full range of motion, no tenderness  Neurological - cranial nerves II through XII grossly intact, no focal deficit  Musculoskeletal - no muscular tenderness noted, normal strength  Extremities - peripheral pulses normal, no pedal edema  Skin - normal coloration  no rashes    12 lead ECG: NSR, non-specific ST-T wave changes     Recent Labs:  Lab Results   Component Value Date/Time    Cholesterol, total 139 06/02/2016 08:19 AM    HDL Cholesterol 45 06/02/2016 08:19 AM    LDL, calculated 76 06/02/2016 08:19 AM    Triglyceride 89 06/02/2016 08:19 AM     Lab Results   Component Value Date/Time    Creatinine 0.99 09/20/2018 12:00 AM     Lab Results   Component Value Date/Time    BUN 10 09/20/2018 12:00 AM     Lab Results   Component Value Date/Time    Potassium 3.9 09/20/2018 12:00 AM     Lab Results   Component Value Date/Time    Hemoglobin A1c 5.6 06/02/2016 08:19 AM     Lab Results   Component Value Date/Time    HGB 12.2 09/16/2015 08:29 PM     Lab Results   Component Value Date/Time    PLATELET 016 71/79/4951 08:29 PM       Reviewed:  Past Medical History:   Diagnosis Date    AICD (automatic cardioverter/defibrillator) present     Anemia NEC     Cardiomyopathy, peripartum, postpartum 2/10/2015    ECHO 2013: EF 50%, mild MR/TR  ECHO 1/2015: EF 30-35% ECHO 5/2015:LVD, EF 25-30%, [address] ECHO 2016: EF 50% ECHO 2017: EF 40-45%, mild DHK, LVH, RV mild dilated ECHO 1/2018: EF 50%, no WMA, wall thickness < no (.8-.9 cm), mil MR/TR,   CARDIAC MRI 4/2016: LVEF 50%, mild GHK, RVEF 60%   AICD 2015: [employer]ik     Class 1 obesity due to excess calories without serious comorbidity with body mass index (BMI) of 30.0 to 30.9 in adult 9/20/2018    Contact dermatitis and other eczema, due to unspecified cause     hives    Hypertension     Other fatigue 1/94/1089    Systolic congestive heart failure with reduced left ventricular function, NYHA class 2 ([facility] 3/29/2018     Social History     Tobacco Use   Smoking Status Never Smoker   Smokeless Tobacco Never Used     Social History     Substance and Sexual Activity   Alcohol Use Yes    Comment: ocass wine or liquor      Allergies   Allergen Reactions    Other Medication Hives     Allergic, to dust mites, molds    Pollen Extracts Hives       Current Outpatient Medications   Medication Sig    chlorthalidone (HYGROTON) 25 mg tablet Take 0.5 Tabs by mouth daily as needed (swelling).  valsartan (DIOVAN) 160 mg tablet Take 1 Tab by mouth daily. Indications: chronic heart failure    carvediloL (COREG) 25 mg tablet TAKE 1 TABLET BY MOUTH TWICE A DAY WITH MEALS    COQ10, UBIQUINOL, PO Take 100 mg by mouth two (2) times a day.  CHOLECALCIFEROL, VITAMIN D3, (VITAMIN D3 PO) Take 5,000 Units by mouth daily.  levocetirizine (XYZAL) 5 mg tablet Take 1 Tab by mouth daily.  fluticasone (FLONASE) 50 mcg/actuation nasal spray 2 Sprays by Both Nostrils route daily. No current facility-administered medications for this visit.        Brijesh Cline MD  [facility] [address], [address] [facility] [address]  [place] Ocampo  [phone]

## 2021-08-13 DIAGNOSIS — I10 ESSENTIAL HYPERTENSION: ICD-10-CM

## 2021-08-13 DIAGNOSIS — Z95.810 ICD (IMPLANTABLE CARDIOVERTER-DEFIBRILLATOR) IN PLACE: ICD-10-CM

## 2021-08-13 RX ORDER — CARVEDILOL 25 MG/1
TABLET ORAL
Qty: 180 TABLET | Refills: 1 | Status: SHIPPED | OUTPATIENT
Start: 2021-08-13

## 2021-08-13 NOTE — TELEPHONE ENCOUNTER
Requested Prescriptions     Signed Prescriptions Disp Refills    carvediloL (COREG) 25 mg tablet 180 Tablet 1     Sig: Take 1 tablet by mouth twice daily with meals.      Authorizing Provider: Yesica Petersen     Ordering User: Cara Kemp     Per verbal orders

## 2021-10-18 ENCOUNTER — OFFICE VISIT (OUTPATIENT)
Dept: CARDIOLOGY CLINIC | Age: 36
End: 2021-10-18
Payer: COMMERCIAL

## 2021-10-18 DIAGNOSIS — Z95.810 ICD (IMPLANTABLE CARDIOVERTER-DEFIBRILLATOR) IN PLACE: Primary | ICD-10-CM

## 2021-10-18 PROCEDURE — 93296 REM INTERROG EVL PM/IDS: CPT | Performed by: INTERNAL MEDICINE

## 2021-10-18 PROCEDURE — 93295 DEV INTERROG REMOTE 1/2/MLT: CPT | Performed by: INTERNAL MEDICINE

## 2021-11-15 ENCOUNTER — OFFICE VISIT (OUTPATIENT)
Dept: CARDIOLOGY CLINIC | Age: 36
End: 2021-11-15

## 2021-11-15 ENCOUNTER — TELEPHONE (OUTPATIENT)
Dept: CARDIOLOGY CLINIC | Age: 36
End: 2021-11-15

## 2021-11-15 DIAGNOSIS — Z95.810 ICD (IMPLANTABLE CARDIOVERTER-DEFIBRILLATOR) IN PLACE: Primary | ICD-10-CM

## 2021-11-15 NOTE — PROGRESS NOTES
AF noted for 13 min on remote download. Xarelto sent to pharmacy.      Atrial Fibrillation CHADSVASC2 Score Stroke Risk:   29 y.o. <65        + 0    female Female +1   CHF HX: Yes    +1   HTN HX: Yes    +1   Stroke/TIA/Thromboembolism No    +0   Vascular Disease HX: No    + 0   Diabetes Mellitus No    + 0   CHADSVASC 2 Score 3      Annual Stroke Risk 3.2% - moderate-high

## 2021-11-15 NOTE — TELEPHONE ENCOUNTER
Remote transmission was sent and patient had one episode of AF that last 13 min and 45 seconds on 11/13/21. Patient said that morning she felt very nauseous. I sent the information to Yanna Claire NP and advised for her to start on Xarelto 20mg 1 time a day and to increase her Coreg dose form 25mg to 37.5mg 2x per day if her systolic BP is >760. Amara also wanted patient to follow up with Dr. Brenden Lorenzo. Patient said she had a visit with her Cardiologist next week, and I advised to to let her know. I also said that after her appointment with Dr. Brenden Lorenzo there will be a detailed note made if her Cardiologist needed, we can send information over to her. Patient verbalized understanding.

## 2021-11-22 ENCOUNTER — DOCUMENTATION ONLY (OUTPATIENT)
Dept: CARDIOLOGY | Age: 36
End: 2021-11-22

## 2021-11-22 NOTE — PROGRESS NOTES
PCP: Dr. Nehemias Bautista   HPI: Jacobo Al, a 30y.o. year-old who presents for evaluation of post partum cardiomyopathy. She reports having nausea with vomiting on Saturday night. She has been getting a lot of episodes of her HR going high on the apple watch. She has a visit coming up with Dr. Brady Milian tomorrow. She didnt want to start meds until seeing him. The two times she was sick and once she was taking a lot of advil  and once smoking MJ    She did not increase her coreg yet either. Has a GI appt coming up. She wants to know her exact EF and asks about a MUGA scan. This is reasonable to find out her exact EF>     She says there is no chance she can be pregnant. She asks today about what I recommend regarding future pregnancies. We discussed that pregnancy is not recommended after peripartum cardiomyopathy because of the risk of  recurrence and jeopardizing her health and the health of her baby. ICD implant report : had episode of AF that last 13 min and 45 seconds on 11/13/21. Patient said that morning she felt very nauseous. Dr. Brady Milian recommended  Xarelto 20mg 1 time a day and to increase her Coreg dose from 25mg to 37.5mg 2x per day   She hasnt needed her chlorthalidone, really doing well, no swelling, no pnd no orthopnea, no chest pain. CHF is stable, compensated, she is not walking as much as she should but doing more. Last EF 52% in April, looking good    She has not started the Xarelto and does not plan to until she can talk with him more tomorrow.       She had been seeing Dr. Lise Cobb at [facility] until his correction  She saw Dr. Maxi Price a few times after that   She has had PPCM since 2014, she delivered in September 2013 and 6 months later developed PPCM  Her lowest EF was 25% in the past and she has an ICD followed by Dr. Brady Milian   She is feeling good  in general  No chest pain or palpitations  No dyspnea with exertion   No PND or orthopnea, dizziness , LE edema      Overall despite her cardiomyopathy she is well compensated and would be a NYHA class I-II. She is not having any worsening of her symptoms or suggestions of volume overload. So at this point I think we will continue her on her current medications. We had a essie discussion today about the risks of repeat pregnancy. She is not actively trying to get pregnant but is not ruling it completely out either. We reviewed the symptoms of worsening heart failure and when to call also reviewed valsartan teratogenicity     **After her office visit labs received dated 12/29/20  CMP ok, , HDL 43, TG 83, a1c 5.3%, Vit D 28, CBC ok     Assessment/Plan:  1. Post partum cardiomyopathy - EF 40-45%->53%  -s/p AICD followed by Dr. Delgado Labor   -valsartan, coreg   -takes chlorthalidone as needed for swelling, not needing it now  2. Atrial Tachycardia - noted on last device check, on coreg   3. Afib [id number]= 2(HTn, female) vs 3(CHF)      Echo 4/21 EF 53%   Echo 8/20 - EF 40-45%, no WMA, mild TR  Fam Hx: no early CAD  Soc Hx: no tobacco use, occ etoh use, no drug use      She  has a past medical history of AICD (automatic cardioverter/defibrillator) present, Anemia NEC, Cardiomyopathy, peripartum, postpartum (2/10/2015), Class 1 obesity due to excess calories without serious comorbidity with body mass index (BMI) of 30.0 to 30.9 in adult (9/20/2018), Contact dermatitis and other eczema, due to unspecified cause, Hypertension, Other fatigue (0/69/7170), and Systolic congestive heart failure with reduced left ventricular function, NYHA class 2 ([facility] (3/29/2018).  She also has no past medical history of Abnormal Pap smear, Abuse, Acquired hypothyroidism, Asthma, Chlamydia, Complication of anesthesia, Diabetes mellitus, Genital herpes, unspecified, Gonorrhea, Heart abnormalities, Herpes gestationis, Herpes simplex without mention of complication, History of abuse, Human immunodeficiency virus (HIV) disease ([facility] OTHER MEDICAL, Infertility, Kidney disease, Liver disease, Phlebitis and thrombophlebitis of unspecified site, Postpartum depression, Psychiatric problem, Rhesus isoimmunization unspecified as to episode of care in pregnancy, Sickle-cell disease, unspecified, Syphilis, Systemic lupus erythematosus ([facility] Trauma, Unspecified breast disorder, Unspecified diseases of blood and blood-forming organs, or Unspecified epilepsy without mention of intractable epilepsy. Cardiovascular ROS: no chest pain or dyspnea on exertion  Respiratory ROS: no cough, shortness of breath, or wheezing  Neurological ROS: no TIA or stroke symptoms  All other systems negative except as above.

## 2021-11-23 ENCOUNTER — OFFICE VISIT (OUTPATIENT)
Dept: CARDIOLOGY CLINIC | Age: 36
End: 2021-11-23
Payer: COMMERCIAL

## 2021-11-23 VITALS
DIASTOLIC BLOOD PRESSURE: 60 MMHG | OXYGEN SATURATION: 100 % | WEIGHT: 197.4 LBS | BODY MASS INDEX: 33.7 KG/M2 | HEIGHT: 64 IN | RESPIRATION RATE: 18 BRPM | HEART RATE: 93 BPM | SYSTOLIC BLOOD PRESSURE: 102 MMHG

## 2021-11-23 DIAGNOSIS — I10 ESSENTIAL HYPERTENSION: ICD-10-CM

## 2021-11-23 DIAGNOSIS — Z95.810 ICD (IMPLANTABLE CARDIOVERTER-DEFIBRILLATOR) IN PLACE: ICD-10-CM

## 2021-11-23 DIAGNOSIS — I48.0 PAROXYSMAL ATRIAL FIBRILLATION (HCC): ICD-10-CM

## 2021-11-23 PROCEDURE — 99214 OFFICE O/P EST MOD 30 MIN: CPT | Performed by: INTERNAL MEDICINE

## 2021-11-23 PROCEDURE — 93000 ELECTROCARDIOGRAM COMPLETE: CPT | Performed by: INTERNAL MEDICINE

## 2021-11-23 NOTE — LETTER
11/23/2021    Patient: Kyle Estevez   YOB: 1985   Date of Visit: 11/23/2021     Pooja Uribe MD  [address] [facility] [address]  Via Fax: [phone]    Dear Pooja Uribe MD,      Thank you for referring Ms. Karis Novak to [facility] for evaluation. My notes for this consultation are attached. If you have questions, please do not hesitate to call me. I look forward to following your patient along with you.       Sincerely,    Lena Jean MD

## 2021-11-23 NOTE — PROGRESS NOTES
ELECTROPHYSIOLOGY        Subjective:      Angeles Chapa is a 29 y.o. female is here for EP follow up. The patient denies chest pain/ shortness of breath, orthopnea, PND, LE edema, palpitations, syncope, presyncope or fatigue. Is aware of a higher resting HR per her watch. Is for MUGA mine and echo (last ) in 2 weeks.      Patient Active Problem List    Diagnosis Date Noted    Class 1 obesity due to excess calories without serious comorbidity with body mass index (BMI) of 30.0 to 30.9 in adult 2018    Other fatigue 9844    Systolic CHF, chronic ([address]     Systolic congestive heart failure with reduced left ventricular function, NYHA class 2 ([address] 2018    HTN (hypertension) 2015    ICD (implantable cardioverter-defibrillator) in place 2015    Cardiomyopathy, peripartum, postpartum--resolved on Rx. 02/10/2015    Vitamin D deficiency 2011    Snoring 2011      Olinda Soto MD  Past Medical History:   Diagnosis Date    AICD (automatic cardioverter/defibrillator) present     Anemia NEC     Atrial fibrillation ([address]     Cardiomyopathy, peripartum, postpartum 2/10/2015    ECHO : EF 50%, mild MR/TR  ECHO 2015: EF 30-35% ECHO 2015:LVD, EF 25-30%, [address] ECHO 2016: EF 50% ECHO 2017: EF 40-45%, mild DHK, LVH, RV mild dilated ECHO 2018: EF 50%, no WMA, wall thickness < no (.8-.9 cm), mil MR/TR,   CARDIAC MRI 2016: LVEF 50%, mild GHK, RVEF 60%   AICD : [employer]     Class 1 obesity due to excess calories without serious comorbidity with body mass index (BMI) of 30.0 to 30.9 in adult 2018    Contact dermatitis and other eczema, due to unspecified cause     hives    Hypertension     Other fatigue     Systolic congestive heart failure with reduced left ventricular function, NYHA class 2 ([address] 3/29/2018      Past Surgical History:   Procedure Laterality Date    HX GYN  13        HX IMPLANTABLE CARDIOVERTER DEFIBRILLATOR  03/19/2015    [employer] #[id number]     Allergies   Allergen Reactions    Other Medication Hives     Allergic, to dust mites, molds    Pollen Extracts Hives      Family History   Problem Relation Age of Onset    Hypertension Mother     Hypertension Father     Diabetes Maternal Grandmother     Heart Disease Maternal Grandmother     Arthritis-osteo Maternal Grandfather     Asthma Brother     Arthritis-osteo Paternal Grandmother     Arthritis-osteo Paternal Grandfather     negative for cardiac disease  Social History     Socioeconomic History    Marital status:     Number of children: 0   Occupational History    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  valsartan (DIOVAN) 160 mg tablet Take 1 Tab by mouth daily. Indications: chronic heart failure    COQ10, UBIQUINOL, PO Take 100 mg by mouth two (2) times a day.  CHOLECALCIFEROL, VITAMIN D3, (VITAMIN D3 PO) Take 5,000 Units by mouth daily.  levocetirizine (XYZAL) 5 mg tablet Take 1 Tab by mouth daily.  fluticasone (FLONASE) 50 mcg/actuation nasal spray 2 Sprays by Both Nostrils route daily.  rivaroxaban (Xarelto) 20 mg tab tablet Take 1 Tablet by mouth daily (with breakfast). (Patient not taking: Reported on 11/23/2021)     No current facility-administered medications for this visit.       Vitals:    11/23/21 1457   BP: 102/60   Pulse: 93   Resp: 18   SpO2: 100%   Weight: 197 lb 6.4 oz (89.5 kg)   Height: 5' 4\" (1.626 m)       I have reviewed the nurses notes, vitals, problem list, allergy list, medical history, family, social history and medications. Review of Symptoms:  11 systems reviewed, negative other than as stated in the HPI      Physical Exam:      General: Well developed, in no acute distress. HEENT: Eyes - PERRL  Heart:  Normal S1/S2 negative S3 or S4. Regular, no murmur  Respiratory: Clear bilaterally x 4, no wheezing or rales  Extremities:  No edema, no cyanosis. Musculoskeletal: No clubbing  Neuro: A&Ox3, speech clear  Skin: No visible rashes or lesions. Non diaphoretic.  No visible ulcers  Vascular: 2+ pulses symmetric in all extremities  Psych - judgement intact and orientation is wnl       Cardiographics    Ek21  sinus    Results for orders placed or performed during the hospital encounter of 09/16/15   EKG, 12 LEAD, INITIAL   Result Value Ref Range    Ventricular Rate 73 BPM    Atrial Rate 73 BPM    P-R Interval 138 ms    QRS Duration 82 ms    Q-T Interval 448 ms    QTC Calculation (Bezet) 493 ms    Calculated P Axis 51 degrees    Calculated R Axis 44 degrees    Calculated T Axis 2 degrees    Diagnosis       Normal sinus rhythm  Normal ekg  Confirmed by Olivia Clement ([id number]) on 2015 7:29:30 AM           Lab Results   Component Value Date/Time    WBC 9.5 2015 08:29 PM    HGB 12.2 2015 08:29 PM    HCT 36.4 2015 08:29 PM    PLATELET 148  08:29 PM    MCV 92.9 2015 08:29 PM      Lab Results   Component Value Date/Time    Sodium 139 2018 12:00 AM    Potassium 3.9 2018 12:00 AM    Chloride 100 2018 12:00 AM    CO2 23 2018 12:00 AM    Anion gap 9 2015 08:29 PM    Glucose 78 2018 12:00 AM    BUN 10 2018 12:00 AM    Creatinine 0.99 2018 12:00 AM    BUN/Creatinine ratio 10 2018 12:00 AM    GFR est AA 88 2018 12:00 AM    GFR est non-AA 76 2018 12:00 AM    Calcium 9.0 09/20/2018 12:00 AM    Bilirubin, total 0.3 09/16/2015 08:29 PM    Alk. phosphatase 57 09/16/2015 08:29 PM    Protein, total 7.9 09/16/2015 08:29 PM    Albumin 3.5 09/16/2015 08:29 PM    Globulin 4.4 (H) 09/16/2015 08:29 PM    A-G Ratio 0.8 (L) 09/16/2015 08:29 PM    ALT (SGPT) 18 09/16/2015 08:29 PM      Lab Results   Component Value Date/Time    TSH 1.330 09/20/2018 12:00 AM           Assessment:           ICD-10-CM ICD-9-CM    1. Cardiomyopathy, peripartum, postpartum--resolved on Rx.  O90.3 674.54 AMB POC EKG ROUTINE W/ 12 LEADS, INTER & REP   2. Essential hypertension  I10 401.9    3. ICD (implantable cardioverter-defibrillator) in place  Z95.810 V45.02    4. Paroxysmal atrial fibrillation (HCC)  I48.0 427.31      Orders Placed This Encounter    AMB POC EKG ROUTINE W/ 12 LEADS, INTER & REP     Order Specific Question:   Reason for Exam:     Answer:   routine        Plan:     Ms Kash Sullivan is a 29year old female with post partum cardiomyopathy with AF, longest episode 13 min. CHADSVASC 2 of 2 (female and cardiomyopathy). She has not started oac. Discussed risk of stroke despite her young age. She is enrolled in remote monitoring and will return in 1 year. Addressed all patient questions and concerns at this visit. Continue medical management for cardiomyopathy. Discussed side effects, efficacy and good medication compliance with pt re: arb and bb. Thank you for allowing me to participate in Ben Galeas 's care. Constantine Fernandez NP    Patient seen and examined by me with nurse practitioner. I personally performed all components of the history, physical, and medical decision making and agree with the assessment and plan with minor modifications as noted. Pt is in sinus and asymptomatic. She had AF last month. We discussed her chadsvasc score of 2 and her being a candidate for oac. She is not interested - she understands the risks of Cva associated with paf.  She is stable and compliant with med rx for cardiomyopathy and chf. For muga and echo later this year. F/u in 6 months to reassess AF burden - not a candidate for AADs secondary to cardiomyopathy. Britney Flowers MD, [facility]          On this date 11/23/2021 I have spent 30 minutes reviewing previous notes, test results and face to face with the patient discussing the diagnosis and importance of compliance with the treatment plan as well as documenting on the day of the visit.

## 2021-11-23 NOTE — PROGRESS NOTES
Chief Complaint   Patient presents with    Irregular Heart Beat     A Fib found on remote - here to discuss starting xarelto - denies cardiac sx      1. Have you been to the ER, urgent care clinic since your last visit? Hospitalized since your last visit? No     2. Have you seen or consulted any other health care providers outside of the [address] [place] [address] since your last visit? Include any pap smears or colon screening.   Yes ER in [place] in 9/2021 for stomach pain and vomiting

## 2022-01-21 ENCOUNTER — OFFICE VISIT (OUTPATIENT)
Dept: CARDIOLOGY CLINIC | Age: 37
End: 2022-01-21
Payer: COMMERCIAL

## 2022-01-21 DIAGNOSIS — Z95.810 ICD (IMPLANTABLE CARDIOVERTER-DEFIBRILLATOR) IN PLACE: Primary | ICD-10-CM

## 2022-01-21 PROCEDURE — 93295 DEV INTERROG REMOTE 1/2/MLT: CPT | Performed by: INTERNAL MEDICINE

## 2022-01-21 PROCEDURE — 93296 REM INTERROG EVL PM/IDS: CPT | Performed by: INTERNAL MEDICINE

## 2022-01-28 ENCOUNTER — TRANSCRIBE ORDER (OUTPATIENT)
Dept: REGISTRATION | Age: 37
End: 2022-01-28

## 2022-01-28 ENCOUNTER — HOSPITAL ENCOUNTER (OUTPATIENT)
Dept: PREADMISSION TESTING | Age: 37
Discharge: HOME | End: 2022-01-28
Attending: INTERNAL MEDICINE
Payer: COMMERCIAL

## 2022-01-28 DIAGNOSIS — U07.1 COVID-19: Primary | ICD-10-CM

## 2022-01-28 DIAGNOSIS — U07.1 COVID-19: ICD-10-CM

## 2022-01-28 PROCEDURE — U0005 INFEC AGEN DETEC AMPLI PROBE: HCPCS

## 2022-01-30 LAB
SARS-COV-2, XPLCVT: NOT DETECTED
SOURCE, COVRS: NORMAL

## 2022-02-01 ENCOUNTER — HOSPITAL ENCOUNTER (OUTPATIENT)
Age: 37
Setting detail: OUTPATIENT SURGERY
Discharge: HOME | End: 2022-02-01
Attending: INTERNAL MEDICINE | Admitting: INTERNAL MEDICINE
Payer: COMMERCIAL

## 2022-02-01 ENCOUNTER — ANESTHESIA (OUTPATIENT)
Dept: ENDOSCOPY | Age: 37
End: 2022-02-01
Payer: COMMERCIAL

## 2022-02-01 ENCOUNTER — ANESTHESIA EVENT (OUTPATIENT)
Dept: ENDOSCOPY | Age: 37
End: 2022-02-01
Payer: COMMERCIAL

## 2022-02-01 VITALS
HEART RATE: 96 BPM | RESPIRATION RATE: 16 BRPM | DIASTOLIC BLOOD PRESSURE: 78 MMHG | WEIGHT: 188 LBS | TEMPERATURE: 99.3 F | OXYGEN SATURATION: 100 % | BODY MASS INDEX: 32.1 KG/M2 | HEIGHT: 64 IN | SYSTOLIC BLOOD PRESSURE: 103 MMHG

## 2022-02-01 LAB — HCG UR QL: NEGATIVE

## 2022-02-01 PROCEDURE — 74011250637 HC RX REV CODE- 250/637: Performed by: INTERNAL MEDICINE

## 2022-02-01 PROCEDURE — 76060000031 HC ANESTHESIA FIRST 0.5 HR: Performed by: INTERNAL MEDICINE

## 2022-02-01 PROCEDURE — 2709999900 HC NON-CHARGEABLE SUPPLY: Performed by: INTERNAL MEDICINE

## 2022-02-01 PROCEDURE — 77030021593 HC FCPS BIOP ENDOSC BSC -A: Performed by: INTERNAL MEDICINE

## 2022-02-01 PROCEDURE — 81025 URINE PREGNANCY TEST: CPT

## 2022-02-01 PROCEDURE — 88305 TISSUE EXAM BY PATHOLOGIST: CPT

## 2022-02-01 PROCEDURE — 74011250636 HC RX REV CODE- 250/636: Performed by: NURSE ANESTHETIST, CERTIFIED REGISTERED

## 2022-02-01 PROCEDURE — 88342 IMHCHEM/IMCYTCHM 1ST ANTB: CPT

## 2022-02-01 PROCEDURE — 76040000019: Performed by: INTERNAL MEDICINE

## 2022-02-01 RX ORDER — MIDAZOLAM HYDROCHLORIDE 1 MG/ML
.25-5 INJECTION, SOLUTION INTRAMUSCULAR; INTRAVENOUS
Status: DISCONTINUED | OUTPATIENT
Start: 2022-02-01 | End: 2022-02-01 | Stop reason: HOSPADM

## 2022-02-01 RX ORDER — SODIUM CHLORIDE 9 MG/ML
150 INJECTION, SOLUTION INTRAVENOUS CONTINUOUS
Status: DISCONTINUED | OUTPATIENT
Start: 2022-02-01 | End: 2022-02-01 | Stop reason: HOSPADM

## 2022-02-01 RX ORDER — ATROPINE SULFATE 0.1 MG/ML
0.5 INJECTION INTRAVENOUS
Status: DISCONTINUED | OUTPATIENT
Start: 2022-02-01 | End: 2022-02-01 | Stop reason: HOSPADM

## 2022-02-01 RX ORDER — FENTANYL CITRATE 50 UG/ML
200 INJECTION, SOLUTION INTRAMUSCULAR; INTRAVENOUS
Status: DISCONTINUED | OUTPATIENT
Start: 2022-02-01 | End: 2022-02-01 | Stop reason: HOSPADM

## 2022-02-01 RX ORDER — EPINEPHRINE 0.1 MG/ML
1 INJECTION INTRACARDIAC; INTRAVENOUS
Status: DISCONTINUED | OUTPATIENT
Start: 2022-02-01 | End: 2022-02-01 | Stop reason: HOSPADM

## 2022-02-01 RX ORDER — SODIUM CHLORIDE 9 MG/ML
INJECTION, SOLUTION INTRAVENOUS
Status: DISCONTINUED | OUTPATIENT
Start: 2022-02-01 | End: 2022-02-01 | Stop reason: HOSPADM

## 2022-02-01 RX ORDER — DEXTROMETHORPHAN/PSEUDOEPHED 2.5-7.5/.8
1.2 DROPS ORAL
Status: DISCONTINUED | OUTPATIENT
Start: 2022-02-01 | End: 2022-02-01 | Stop reason: HOSPADM

## 2022-02-01 RX ORDER — SODIUM CHLORIDE 0.9 % (FLUSH) 0.9 %
5-40 SYRINGE (ML) INJECTION EVERY 8 HOURS
Status: DISCONTINUED | OUTPATIENT
Start: 2022-02-01 | End: 2022-02-01 | Stop reason: HOSPADM

## 2022-02-01 RX ORDER — SODIUM CHLORIDE 0.9 % (FLUSH) 0.9 %
5-40 SYRINGE (ML) INJECTION AS NEEDED
Status: DISCONTINUED | OUTPATIENT
Start: 2022-02-01 | End: 2022-02-01 | Stop reason: HOSPADM

## 2022-02-01 RX ORDER — PROPOFOL 10 MG/ML
INJECTION, EMULSION INTRAVENOUS AS NEEDED
Status: DISCONTINUED | OUTPATIENT
Start: 2022-02-01 | End: 2022-02-01 | Stop reason: HOSPADM

## 2022-02-01 RX ADMIN — PROPOFOL 50 MG: 10 INJECTION, EMULSION INTRAVENOUS at 08:46

## 2022-02-01 RX ADMIN — PROPOFOL 50 MG: 10 INJECTION, EMULSION INTRAVENOUS at 08:40

## 2022-02-01 RX ADMIN — PROPOFOL 50 MG: 10 INJECTION, EMULSION INTRAVENOUS at 08:56

## 2022-02-01 RX ADMIN — PROPOFOL 50 MG: 10 INJECTION, EMULSION INTRAVENOUS at 08:43

## 2022-02-01 RX ADMIN — SODIUM CHLORIDE: 900 INJECTION, SOLUTION INTRAVENOUS at 08:30

## 2022-02-01 RX ADMIN — PROPOFOL 50 MG: 10 INJECTION, EMULSION INTRAVENOUS at 08:51

## 2022-02-01 RX ADMIN — PROPOFOL 100 MG: 10 INJECTION, EMULSION INTRAVENOUS at 08:39

## 2022-02-01 NOTE — PERIOP NOTES

## 2022-02-01 NOTE — PROCEDURES
Tim Watts [address] [facility], M.D.  Dedrick Funez, [address] [facility]  [phone]               Esophagogastroduodenoscopy (EGD) Procedure Note    NAME: Kaylin Jung  :  1985  MRN:  [id number]    Indications:  Nausea/vomiting     : Annie Bennett MD    Referring Provider:  Thai Lange MD    Staff[facility] Reading: Marta Alegre  Endoscopy RN-1: Boris Rivas RN    Prosthetic devices, grafts, tissues, transplant, or devices implanted: none    Medicine:  MAC anesthesia      Procedure Details:  After informed consent was obtained with all risks and benefits of the procedure explained and preprocedure exam completed, the patient was placed in the left lateral decubitus position. Universal protocol for patient identification was performed and documented in the nursing notes. Throughout the procedure, the patient's blood pressure was monitored at least every five minutes; pulse, and oxygen saturations were monitored continuously. All vital signs were documented in the nursing notes. The endoscope was inserted into the mouth and advanced under direct vision to second portion of the duodenum. A careful inspection was made as the gastroscope was withdrawn, including a retroflexed view of the proximal stomach; findings and interventions are described below.       Findings:   Esophagus:normal  Stomach: mild diffuse erythema in the body and antrum s/p biopsies throughout the stomach  Duodenum: normal    Interventions:    biopsy of stomach whole    Specimens:   ID Type Source Tests Collected by Time Destination   1 : gastric bx r/o  h.pylori Preservative Gastric  Otto Hackett MD 2022 8070 Pathology   2 : random colon bx r/o microscopic colitis Preservative   Otto Hackett MD 2022 8521 Pathology   3 : Sigmoid colon polyp Preservative Sigmoid  Otto Hackett MD 2022 2119 Pathology        EBL: None          Complications:     No immediate complications        Impression:  -See post-procedure diagnoses. Recommendations:  -Await pathology. -H2 blockers PRN    Signed by:  Mary Ann Clayton MD         2/1/2022 9:09 AM

## 2022-02-01 NOTE — PROCEDURES
[place] [id number] Kevin Boone M.D.  [address] [facility], [address] [place] [address]  [phone]               Colonoscopy Procedure Note    NAME: Arpita Rivas  :  1985  MRN:  [id number]    Indications:   Diarrhea     : Mina Ivey MD    Referring Provider:  Agata Rothman MD    Staff: Endoscopy Bertrand Both: Scarlett Opitz  Endoscopy RN-1: Sienna Pradhan RN    Prosthetic devices, grafts, tissues, transplant, or devices implanted: none    Medicines:  MAC anesthesia      Procedure Details:  After informed consent was obtained with all risks and benefits of the procedure explained and preprocedure exam completed, the patient was placed in the left lateral decubitus position. Universal protocol for patient identification was performed and documented in the nursing notes. Throughout the procedure, the patient's blood pressure was monitored at least every five minutes; pulse, and oxygen saturations were monitored continuously. All vital signs were documented in the nursing notes. A digital rectal exam was performed and was normal.  The Olympus videocolonoscope  was inserted in the rectum and carefully advanced to the terminal ileum. The colonoscope was slowly withdrawn with careful evaluation between folds. Retroflexion in the rectum was performed; findings and interventions are described below. Procedure start time, extent reached time/cecum time, and procedure end time are documented in the nursing notes. The quality of preparation was adequate. Findings:   1. Normal TI  2. 2 mm sessile polyp in the sigmoid colon s/p cold forceps polypectomy  3. Small internal hemorrhoids  4.  Rest of the colon was normal s/p biopsies to evaluate for microscopic colitis    Interventions:    biopsy of colon colon    Specimens:   ID Type Source Tests Collected by Time Destination   1 : gastric bx r/o  h.pylori Preservative Gastric  Francisco Huffman MD 2022 6091 Pathology   2 : random colon bx r/o microscopic colitis Preservative   Andra Redding MD 2/1/2022 9811 Pathology   3 : Sigmoid colon polyp Preservative Sigmoid  Andra Redding MD 2/1/2022 7612 Pathology       EBL:  None. Complications:   No immediate complications     Impression:  -See post-procedure diagnoses. Recommendations:   -If adenoma is present, repeat colonoscopy in 5-7 years based on pathology. If < 10 years, reason:  above average risk patient    Resume normal medication(s). Signed by:  Alem Morillo MD          2/1/2022  9:11 AM

## 2022-02-01 NOTE — DISCHARGE INSTRUCTIONS
Tim Watts [address] Yanci Dueñas M.D.  [facility], [address]  [phone]                      EGD/COLONOSCOPY DISCHARGE INSTRUCTIONS    Kaylin Jung  [id number]  1985    DISCOMFORT:  Redness at IV site- apply warm compress to area; if redness or soreness persist- contact your physician  There may be a slight amount of blood passed from the rectum  Gaseous discomfort- walking, belching will help relieve any discomfort  You may not operate a vehicle for 12 hours  You may not  engage in an occupation involving machinery or appliances for rest of today  You may not  drink alcoholic beverages for at least 12 hours  Avoid making any critical decisions for at least 24 hour    DIET:   You may resume your normal diet, but some patients find that heavy or large  meals may lead to indigestion or vomiting. I suggest a light meal as first food  Intake. I recommend a whole food, plant-based diet for your overall health. ACTIVITY:  You may resume your normal daily activities. It is recommended that you spend the remainder of the day resting - avoid any strenuous activity. CALL M.D. ANY SIGN OF   Increasing pain, nausea, vomiting  Abdominal distension (swelling)  New increased bleeding (oral or rectal)  Fever (chills)  Pain in chest area  Bloody discharge from nose or mouth  Shortness of breath    Additional Instructions:   Call Dr. Yanci Dueñas if any questions or problems at [phone]   You should receive the biopsy results by phone or mail within 3 weeks, if not, call my office for the results. Should have a repeat colonoscopy in 5-10 years based on pathology. EGD showed mild stomach redness-may be acid related. Use over the counter famotidine 20 mg twice daily if needed. Colonoscopy showed one small polyp removed and small internal hemorrhoids. Biopsies taken.         Patient Education        Gastritis: Care Instructions  Your Care Instructions     Gastritis is a sore and upset stomach. It happens when something irritates the stomach lining. Many things can cause it. These include an infection such as the flu or something you ate or drank. Medicines or a sore on the lining of the stomach (ulcer) also can cause it. Your belly may bloat and ache. You may belch, vomit, and feel sick to your stomach. You should be able to relieve the problem by taking medicine. And it may help to change your diet. If gastritis lasts, your doctor may prescribe medicine. Follow-up care is a key part of your treatment and safety. Be sure to make and go to all appointments, and call your doctor if you are having problems. It's also a good idea to know your test results and keep a list of the medicines you take. How can you care for yourself at home? · If your doctor prescribed antibiotics, take them as directed. Do not stop taking them just because you feel better. You need to take the full course of antibiotics. · Be safe with medicines. If your doctor prescribed medicine to decrease stomach acid, take it as directed. Call your doctor if you think you are having a problem with your medicine. · Do not take any other medicine, including over-the-counter pain relievers, without talking to your doctor first.  · If your doctor recommends over-the-counter medicine to reduce stomach acid, such as Pepcid AC (famotidine), Prilosec (omeprazole), or Tagamet HB (cimetidine) follow the directions on the label. · Drink plenty of fluids to prevent dehydration. Choose water and other clear liquids. If you have kidney, heart, or liver disease and have to limit fluids, talk with your doctor before you increase the amount of fluids you drink. · Limit how much alcohol you drink. · Limit or avoid caffeine, which is found in coffee, tea, cola drinks, and chocolate. When should you call for help? Call 911 anytime you think you may need emergency care.  For example, call if:    · You vomit blood or what looks like coffee grounds.     · You pass maroon or very bloody stools. Call your doctor now or seek immediate medical care if:    · You start breathing fast and have not produced urine in the last 8 hours.     · You cannot keep fluids down. Watch closely for changes in your health, and be sure to contact your doctor if:    · You do not get better as expected. Where can you learn more? Go to http://www.ramos.com/  Enter Z536 in the search box to learn more about \"Gastritis: Care Instructions. \"  Current as of: February 10, 2021               Content Version: 13.0  © 5430-5496 [employer]. Care instructions adapted under license by [employer] (which disclaims liability or warranty for this information). If you have questions about a medical condition or this instruction, always ask your healthcare professional. [address] any warranty or liability for your use of this information. Learning About Coronavirus [phone])  Coronavirus [phone]): Overview  What is coronavirus (COVID-19)? The coronavirus disease (COVID-19) is caused by a virus. It is an illness that was first found in [place], [place], in December 2019. It has since spread worldwide. The virus can cause fever, cough, and trouble breathing. In severe cases, it can cause pneumonia and make it hard to breathe without help. It can cause death. Coronaviruses are a large group of viruses. They cause the common cold. They also cause more serious illnesses like Middle East respiratory syndrome (MERS) and severe acute respiratory syndrome (SARS). COVID-19 is caused by a novel coronavirus. That means it's a new type that has not been seen in people before. This virus spreads person-to-person through droplets from coughing and sneezing. It can also spread when you are close to someone who is infected.  And it can spread when you touch something that has the virus on it, such as a doorknob or a tabletop. What can you do to protect yourself from coronavirus (COVID-19)? The best way to protect yourself from getting sick is to:  · Avoid areas where there is an outbreak. · Avoid contact with people who may be infected. · Wash your hands often with soap or alcohol-based hand sanitizers. · Avoid crowds and try to stay at least 6 feet away from other people. · Wash your hands often, especially after you cough or sneeze. Use soap and water, and scrub for at least 20 seconds. If soap and water aren't available, use an alcohol-based hand . · Avoid touching your mouth, nose, and eyes. What can you do to avoid spreading the virus to others? To help avoid spreading the virus to others:  · Cover your mouth with a tissue when you cough or sneeze. Then throw the tissue in the trash. · Use a disinfectant to clean things that you touch often. · Stay home if you are sick or have been exposed to the virus. Don't go to school, work, or public areas. And don't use public transportation. · If you are sick:  ? Leave your home only if you need to get medical care. But call the doctor's office first so they know you're coming. And wear a face mask, if you have one.  ? If you have a face mask, wear it whenever you're around other people. It can help stop the spread of the virus when you cough or sneeze. ? Clean and disinfect your home every day. Use household  and disinfectant wipes or sprays. Take special care to clean things that you grab with your hands. These include doorknobs, remote controls, phones, and handles on your refrigerator and microwave. And don't forget countertops, tabletops, bathrooms, and computer keyboards. When to call for help  Call 911 anytime you think you may need emergency care. For example, call if:  · You have severe trouble breathing. (You can't talk at all.)  · You have constant chest pain or pressure. · You are severely dizzy or lightheaded.   · You are confused or can't think clearly. · Your face and lips have a blue color. · You pass out (lose consciousness) or are very hard to wake up. Call your doctor now if you develop symptoms such as:  · Shortness of breath. · Fever. · Cough. If you need to get care, call ahead to the doctor's office for instructions before you go. Make sure you wear a face mask, if you have one, to prevent exposing other people to the virus. Where can you get the latest information? The following health organizations are tracking and studying this virus. Their websites contain the most up-to-date information. Chance Morrow also learn what to do if you think you may have been exposed to the virus. · U.S. Centers for Disease Control and Prevention (CDC): The CDC provides updated news about the disease and travel advice. The website also tells you how to prevent the spread of infection. www.cdc.gov  · World Health Organization [facility]): WHO offers information about the virus outbreaks. WHO also has travel advice. www.who.int  Current as of: April 1, 2020               Content Version: 12.4  © 4870-5414 [employer]. Care instructions adapted under license by your healthcare professional. If you have questions about a medical condition or this instruction, always ask your healthcare professional. [address] any warranty or liability for your use of this information.

## 2022-02-01 NOTE — ANESTHESIA PREPROCEDURE EVALUATION
Relevant Problems   No relevant active problems       Anesthetic History   No history of anesthetic complications            Review of Systems / Medical History  Patient summary reviewed, nursing notes reviewed and pertinent labs reviewed    Pulmonary  Within defined limits                 Neuro/Psych   Within defined limits           Cardiovascular    Hypertension: well controlled      CHF  Dysrhythmias   Pacemaker    Exercise tolerance: >4 METS  Comments:  The calculated EF is 53% 4/2021  post partum cardiomyopathy and AICD-- resolving   GI/Hepatic/Renal                Endo/Other        Morbid obesity     Other Findings            Physical Exam    Airway  Mallampati: I  TM Distance: > 6 cm  Neck ROM: normal range of motion   Mouth opening: Normal     Cardiovascular    Rhythm: regular           Dental  No notable dental hx       Pulmonary  Breath sounds clear to auscultation               Abdominal         Other Findings            Anesthetic Plan    ASA: 3  Anesthesia type: MAC          Induction: Intravenous  Anesthetic plan and risks discussed with: Patient

## 2022-02-01 NOTE — ANESTHESIA POSTPROCEDURE EVALUATION
Post-Anesthesia Evaluation and Assessment    Patient: Kaylin Jung MRN: [id number]  SSN: [id number]    YOB: 1985  Age: 29 y.o. Sex: female      I have evaluated the patient and they are stable and ready for discharge from the PACU. Cardiovascular Function/Vital Signs  Visit Vitals  /78   Pulse 96   Temp 37.4 °C (99.3 °F)   Resp 16   Ht 5' 4\" (1.626 m)   Wt 85.3 kg (188 lb)   SpO2 100%   BMI 32.27 kg/m²       Patient is status post MAC anesthesia for Procedure(s):  COLONOSCOPY  ESOPHAGOGASTRODUODENOSCOPY (EGD)   :-  ESOPHAGOGASTRODUODENAL (EGD) BIOPSY. Nausea/Vomiting: None    Postoperative hydration reviewed and adequate. Pain:  Pain Scale 1: Numeric (0 - 10) (02/01/22 0925)  Pain Intensity 1: 0 (02/01/22 0925)   Managed    Neurological Status: At baseline    Mental Status, Level of Consciousness: Alert and  oriented to person, place, and time    Pulmonary Status:   O2 Device: None (Room air) (02/01/22 0925)   Adequate oxygenation and airway patent    Complications related to anesthesia: None    Post-anesthesia assessment completed. No concerns    Signed By: Navya Salinas MD     February 1, 2022              Procedure(s):  COLONOSCOPY  ESOPHAGOGASTRODUODENOSCOPY (EGD)   :-  ESOPHAGOGASTRODUODENAL (EGD) BIOPSY. MAC    <BSHSIANPOST>    INITIAL Post-op Vital signs:   Vitals Value Taken Time   /78 02/01/22 0925   Temp 37.4 °C (99.3 °F) 02/01/22 0910   Pulse 100 02/01/22 0927   Resp 16 02/01/22 0927   SpO2 100 % 02/01/22 0927   Vitals shown include unvalidated device data.

## 2022-02-15 ENCOUNTER — TRANSCRIBE ORDER (OUTPATIENT)
Dept: SCHEDULING | Age: 37
End: 2022-02-15

## 2022-02-15 DIAGNOSIS — I42.9 FAMILIAL CARDIOMYOPATHY (HCC): Primary | ICD-10-CM

## 2022-02-15 DIAGNOSIS — I50.20 HEART FAILURE, SYSTOLIC (HCC): ICD-10-CM

## 2022-02-21 ENCOUNTER — HOSPITAL ENCOUNTER (OUTPATIENT)
Dept: NUCLEAR MEDICINE | Age: 37
Discharge: HOME | End: 2022-02-21
Attending: INTERNAL MEDICINE
Payer: COMMERCIAL

## 2022-02-21 DIAGNOSIS — I42.9 FAMILIAL CARDIOMYOPATHY (HCC): ICD-10-CM

## 2022-02-21 DIAGNOSIS — I50.20 HEART FAILURE, SYSTOLIC (HCC): ICD-10-CM

## 2022-02-21 LAB — STRESS TARGET HR: 185 BPM

## 2022-02-21 PROCEDURE — 78472 GATED HEART PLANAR SINGLE: CPT

## 2022-03-16 NOTE — PROGRESS NOTES
Her left ventricle is large and the muscle wall is thin. This is why she has the ICD. Her heart function is intermediate at 45%. Please encourage her to continue her medications per Dr. Sylvie Salamanca orders and please do not adjust them on her own. She should follow up with us as planned. Taltz Counseling: I discussed with the patient the risks of ixekizumab including but not limited to immunosuppression, serious infections, worsening of inflammatory bowel disease and drug reactions.  The patient understands that monitoring is required including a PPD at baseline and must alert us or the primary physician if symptoms of infection or other concerning signs are noted.

## 2022-03-18 PROBLEM — R53.83 OTHER FATIGUE: Status: ACTIVE | Noted: 2018-09-20

## 2022-03-18 PROBLEM — I50.20 SYSTOLIC CONGESTIVE HEART FAILURE WITH REDUCED LEFT VENTRICULAR FUNCTION, NYHA CLASS 2 (HCC): Status: ACTIVE | Noted: 2018-03-29

## 2022-03-19 PROBLEM — E66.09 CLASS 1 OBESITY DUE TO EXCESS CALORIES WITHOUT SERIOUS COMORBIDITY WITH BODY MASS INDEX (BMI) OF 30.0 TO 30.9 IN ADULT: Status: ACTIVE | Noted: 2018-09-20

## 2022-03-20 PROBLEM — I50.22 SYSTOLIC CHF, CHRONIC (HCC): Status: ACTIVE | Noted: 2018-05-01

## 2022-06-30 ENCOUNTER — HOSPITAL ENCOUNTER (EMERGENCY)
Age: 37
Discharge: HOME | End: 2022-06-30
Attending: EMERGENCY MEDICINE
Payer: COMMERCIAL

## 2022-06-30 ENCOUNTER — APPOINTMENT (OUTPATIENT)
Dept: GENERAL RADIOLOGY | Age: 37
End: 2022-06-30
Attending: EMERGENCY MEDICINE
Payer: COMMERCIAL

## 2022-06-30 VITALS
HEART RATE: 105 BPM | DIASTOLIC BLOOD PRESSURE: 78 MMHG | BODY MASS INDEX: 34.18 KG/M2 | RESPIRATION RATE: 16 BRPM | OXYGEN SATURATION: 99 % | HEIGHT: 64 IN | SYSTOLIC BLOOD PRESSURE: 119 MMHG | TEMPERATURE: 98.3 F | WEIGHT: 200.18 LBS

## 2022-06-30 DIAGNOSIS — R09.81 SINUS CONGESTION: ICD-10-CM

## 2022-06-30 DIAGNOSIS — J02.9 PHARYNGITIS, UNSPECIFIED ETIOLOGY: ICD-10-CM

## 2022-06-30 DIAGNOSIS — R05.9 COUGH: Primary | ICD-10-CM

## 2022-06-30 PROCEDURE — 99283 EMERGENCY DEPT VISIT LOW MDM: CPT

## 2022-06-30 PROCEDURE — 71045 X-RAY EXAM CHEST 1 VIEW: CPT

## 2022-06-30 PROCEDURE — 74011250637 HC RX REV CODE- 250/637: Performed by: PHYSICIAN ASSISTANT

## 2022-06-30 RX ORDER — PROMETHAZINE HYDROCHLORIDE AND CODEINE PHOSPHATE 6.25; 1 MG/5ML; MG/5ML
5 SOLUTION ORAL
Qty: 100 ML | Refills: 0 | Status: SHIPPED | OUTPATIENT
Start: 2022-06-30 | End: 2022-07-05

## 2022-06-30 RX ORDER — OXYMETAZOLINE HCL 0.05 %
2 SPRAY, NON-AEROSOL (ML) NASAL
Status: DISCONTINUED | OUTPATIENT
Start: 2022-06-30 | End: 2022-07-01 | Stop reason: HOSPADM

## 2022-06-30 RX ORDER — ERYTHROMYCIN 5 MG/G
OINTMENT OPHTHALMIC
Status: COMPLETED | OUTPATIENT
Start: 2022-06-30 | End: 2022-06-30

## 2022-06-30 RX ORDER — DEXAMETHASONE SODIUM PHOSPHATE 4 MG/ML
6 INJECTION, SOLUTION INTRA-ARTICULAR; INTRALESIONAL; INTRAMUSCULAR; INTRAVENOUS; SOFT TISSUE
Status: COMPLETED | OUTPATIENT
Start: 2022-06-30 | End: 2022-06-30

## 2022-06-30 RX ORDER — METHYLPREDNISOLONE 4 MG/1
TABLET ORAL
Qty: 1 DOSE PACK | Refills: 0 | Status: SHIPPED | OUTPATIENT
Start: 2022-06-30

## 2022-06-30 RX ADMIN — DEXAMETHASONE SODIUM PHOSPHATE 6 MG: 4 INJECTION, SOLUTION INTRAMUSCULAR; INTRAVENOUS at 22:54

## 2022-06-30 RX ADMIN — ERYTHROMYCIN: 5 OINTMENT OPHTHALMIC at 22:54

## 2022-06-30 RX ADMIN — OXYMETAZOLINE HCL 2 SPRAY: 0.05 SPRAY NASAL at 22:54

## 2022-07-01 NOTE — ED PROVIDER NOTES
EMERGENCY DEPARTMENT HISTORY AND PHYSICAL EXAM      Date: 6/30/2022  Patient Name: Michelet Hdz    History of Presenting Illness     Chief Complaint   Patient presents with    Cough     Patient stated that she has been coughing for the last two weeks and her chest and throat are starting to hurt from it. She went to [facility] last Sunday where she tested negative for flu and COVID, patient negative for pneumonia but was put on antibiotics when discharged. Denies taking any analgesics. Febrile for two days two weeks ago. History Provided By: Patient    HPI: Michelet Hdz, 29 y.o. female with significant PMHx as noted below, presents ambulatory to the ED with cc of mild, intermittent cough, sinus congestion and sore throat. Patient states symptoms began last week with a runny nose. States she then had a cough and sinus congestion. States she was seen at urgent care on Sunday, 6/26, and was COVID19 and flu negative. She was placed on amoxicillin, but endorses minimal improvement with her cough. States she will coughing episodes that make her throat hurt. Denies chest pain, shortness of breath, or wheezing. Cough is not productive. Endorses mild congestion. States she had fevers at initial onset, but has not had any since. Tolerating p.o. well, no changes in bowel or bladder habits. Denies any leg pain or swelling, recent travel or injuries, recent trauma or surgeries, history of blood clots. States her daughter was sick with similar cold symptoms prior to symptom onset. Has also been taking Tessalon Perles for cough with minimal improvement. No additional exacerbating or alleviating factors. Denies any dizziness, headache, neck pain or stiffness, palpitations, wheezing, abdominal pain, nausea, vomiting, diarrhea, changes in bowel or bladder habits. There are no other complaints, changes, or physical findings at this time.     PCP: Severiano Lipschutz, MD    No current facility-administered medications on file prior to encounter. Current Outpatient Medications on File Prior to Encounter   Medication Sig Dispense Refill    valsartan (DIOVAN) 160 mg tablet Take 1 tablet by mouth daily as indicated for chronic heart failure. 90 Tablet 0    carvediloL (COREG) 25 mg tablet Take 1 tablet by mouth twice daily with meals. 180 Tablet 1    COQ10, UBIQUINOL, PO Take 100 mg by mouth two (2) times a day.  CHOLECALCIFEROL, VITAMIN D3, (VITAMIN D3 PO) Take 5,000 Units by mouth daily.  levocetirizine (XYZAL) 5 mg tablet Take 1 Tab by mouth daily. 30 Tab 5    fluticasone (FLONASE) 50 mcg/actuation nasal spray 2 Sprays by Both Nostrils route daily.  1 Bottle 5       Past History     Past Medical History:  Past Medical History:   Diagnosis Date    AICD (automatic cardioverter/defibrillator) present     Anemia NEC     Atrial fibrillation ([facility] [address]     Cardiomyopathy, peripartum, postpartum 2/10/2015    ECHO : EF 50%, mild MR/TR  ECHO 2015: EF 30-35% ECHO 2015:LVD, EF 25-30%, [address] ECHO 2016: EF 50% ECHO 2017: EF 40-45%, mild DHK, LVH, RV mild dilated ECHO 2018: EF 50%, no WMA, wall thickness < no (.8-.9 cm), mil MR/TR,   CARDIAC MRI 2016: LVEF 50%, mild GHK, RVEF 60%   AICD : [place]     Class 1 obesity due to excess calories without serious comorbidity with body mass index (BMI) of 30.0 to 30.9 in adult 2018    Contact dermatitis and other eczema, due to unspecified cause     hives    Hypertension     Other fatigue 7811    Systolic congestive heart failure with reduced left ventricular function, NYHA class 2 ([address] 3/29/2018       Past Surgical History:  Past Surgical History:   Procedure Laterality Date    COLONOSCOPY N/A 2022    COLONOSCOPY performed by Estrellita Bradford MD at [address] HX GYN  13        HX IMPLANTABLE CARDIOVERTER DEFIBRILLATOR  2015    [employer] #[id number]       Family History:  Family History Problem Relation Age of Onset    Hypertension Mother     Hypertension Father     Diabetes Maternal Grandmother     Heart Disease Maternal Grandmother     OSTEOARTHRITIS Maternal Grandfather     Asthma Brother     OSTEOARTHRITIS Paternal Grandmother     OSTEOARTHRITIS Paternal Grandfather        Social History:  Social History     Tobacco Use    Smoking status: Never Smoker    Smokeless tobacco: Never Used   Vaping Use    Vaping Use: Never used   Substance Use Topics    Alcohol use: Yes     Comment: ocass wine or liquor     Drug use: No     Comment: denies        Allergies: Allergies   Allergen Reactions    Other Medication Hives     Allergic, to dust mites, molds    Pollen Extracts Hives         Review of Systems   Review of Systems   Constitutional: Negative for appetite change, chills and fever. HENT: Positive for congestion, postnasal drip, rhinorrhea and sore throat. Negative for trouble swallowing and voice change. Eyes: Negative for pain. Respiratory: Positive for cough. Negative for shortness of breath. Cardiovascular: Negative for chest pain, palpitations and leg swelling. Gastrointestinal: Negative for abdominal pain, constipation, diarrhea, nausea and vomiting. Genitourinary: Negative for difficulty urinating, dysuria and frequency. Musculoskeletal: Negative for neck pain and neck stiffness. Skin: Negative for rash. Neurological: Negative for syncope and headaches. All other systems reviewed and are negative. Physical Exam   Physical Exam  Vitals and nursing note reviewed. Constitutional:       General: She is not in acute distress. Appearance: Normal appearance. She is not ill-appearing, toxic-appearing or diaphoretic. Comments: 29 y.o. female   HENT:      Head: Normocephalic and atraumatic. Right Ear: Tympanic membrane, ear canal and external ear normal. There is no impacted cerumen.       Left Ear: Tympanic membrane, ear canal and external ear normal. There is no impacted cerumen. Nose: Congestion and rhinorrhea present. Comments: Nares erythematous and boggy with moderate congestion. Mouth/Throat:      Mouth: Mucous membranes are moist.      Pharynx: No oropharyngeal exudate or posterior oropharyngeal erythema. Eyes:      Extraocular Movements: Extraocular movements intact. Conjunctiva/sclera: Conjunctivae normal.   Cardiovascular:      Rate and Rhythm: Normal rate and regular rhythm. Heart sounds: Normal heart sounds. No friction rub. No gallop. Pulmonary:      Effort: Pulmonary effort is normal. No respiratory distress. Breath sounds: Normal breath sounds. No wheezing or rales. Abdominal:      General: There is no distension. Palpations: Abdomen is soft. There is no mass. Tenderness: There is no abdominal tenderness. There is no guarding. Musculoskeletal:         General: Normal range of motion. Cervical back: Normal range of motion. Right lower leg: No edema. Left lower leg: No edema. Comments: Negative Kalpana's sign bilaterally    Skin:     General: Skin is warm and dry. Neurological:      General: No focal deficit present. Mental Status: She is alert and oriented to person, place, and time. Psychiatric:         Mood and Affect: Mood normal.         Behavior: Behavior normal.         Diagnostic Study Results     Labs -   No results found for this or any previous visit (from the past 12 hour(s)). Radiologic Studies -   XR CHEST PORT   Final Result   1. No acute disease           CT Results  (Last 48 hours)    None        CXR Results  (Last 48 hours)               06/30/22 2138  XR CHEST PORT Final result    Impression:  1. No acute disease           Narrative:          INDICATION:  Cough/CP        Exam: Portable chest 2128. Comparison: 9/16/2015. Findings: Cardiomediastinal silhouette is within normal limits.  Left chest   single-lead AICD obscures the mid left chest Pulmonary vasculature is not   engorged. There are no focal parenchymal opacities, effusions, or pneumothorax. Medical Decision Making   I am the first provider for this patient. I reviewed the vital signs, available nursing notes, past medical history, past surgical history, family history and social history. Vital Signs-Reviewed the patient's vital signs. Patient Vitals for the past 12 hrs:   Temp Pulse Resp BP SpO2   06/30/22 2237 -- (!) 105 -- -- 99 %   06/30/22 2100 98.3 °F (36.8 °C) (!) 103 16 119/78 100 %       Records Reviewed: Nursing Notes and Old Medical Records    Provider Notes (Medical Decision Making):   Patient presents with mild, intermittent cough, sinus congestion as noted above. Afebrile, non-toxic appearing. No hypoxia or increased WOB,  Breath sounds clear throughout. Do not suspect PE, ACS, endocarditis, myocarditis, meningitis, or other emergent conditions requiring further evaluation/management acutely here at this time. Advised continuing amoxicillin until completion. We will add short course steroid taper and short course of strong her cough syrup for her symptoms. Discussed medication use and side effect profile. Shared decision-making form and care plan created together, discussed results, diagnosis, treatment plan. PCP follow-up. Verbal return precautions advised. Patient verbalizes understanding and agreement of current plan of care. ED Course:   Initial assessment performed. The patients presenting problems have been discussed, and they are in agreement with the care plan formulated and outlined with them. I have encouraged them to ask questions as they arise throughout their visit. Critical Care Time: None    Disposition:  Discharge     PLAN:  1.    Discharge Medication List as of 6/30/2022 10:53 PM      START taking these medications    Details   methylPREDNISolone (Medrol, Richard,) 4 mg tablet Take as instructed on pack, Normal, Disp-1 Dose Pack, R-0      promethazine-codeine (PHENERGAN with CODEINE) 6.25-10 mg/5 mL syrup Take 5 mL by mouth every six (6) hours as needed for Cough for up to 5 days. Max Daily Amount: 20 mL., Normal, Disp-100 mL, R-0         CONTINUE these medications which have NOT CHANGED    Details   valsartan (DIOVAN) 160 mg tablet Take 1 tablet by mouth daily as indicated for chronic heart failure., Normal, Disp-90 Tablet, R-0Further refills need to come from Dr. Eliezer Worley      carvediloL (COREG) 25 mg tablet Take 1 tablet by mouth twice daily with meals. , Normal, Disp-180 Tablet, R-1      COQ10, UBIQUINOL, PO Take 100 mg by mouth two (2) times a day., Historical Med      CHOLECALCIFEROL, VITAMIN D3, (VITAMIN D3 PO) Take 5,000 Units by mouth daily. , Historical Med      levocetirizine (XYZAL) 5 mg tablet Take 1 Tab by mouth daily. , Normal, Disp-30 Tab, R-5      fluticasone (FLONASE) 50 mcg/actuation nasal spray 2 Sprays by Both Nostrils route daily. , Print, Disp-1 Bottle, R-5           2. Follow-up Information     Follow up With Specialties Details Why Contact Info    [facility] EMERGENCY DEPT Emergency Medicine  As needed, If symptoms worsen [address] [facility] [address]    Jerry Crowley MD Family Medicine In 1 week  [address] [facility] [phone]      [facility] Pulmonary Department   As needed, If symptoms worsen [address] [facility],[address]  [phone]        Return to ED if worse     Diagnosis     Clinical Impression:   1. Cough    2. Sinus congestion    3. Pharyngitis, unspecified etiology          Please note that this dictation was completed with [employer], the [employer] voice recognition software. Quite often unanticipated grammatical, syntax, homophones, and other interpretive errors are inadvertently transcribed by the computer software. Please disregards these errors.  Please excuse any errors that have escaped final proofreading.

## 2022-07-01 NOTE — DISCHARGE INSTRUCTIONS
Continue amoxicillin as previously instructed   Alternate Motrin and Tylenol as directed, as needed for body aches, fevers, and sore throat   Zyrtec and Flonase as we discussed   Afrin nasal spray (twice a day for no more than 3 days)   Steroid taper as prescribed (starting tomorrow, as your first dose was given to you here in the ED)   Cough syrup as directed, as needed for cough (be careful as this medication may cause drowsiness, therefore do not take with other sedating substances)  Rest and drink plenty of fluids     Consider additional symptomatic management techniques such as Chloraseptic spray, throat lozenges, normal saline nasal rinses etc.     Return precautions as we discussed

## 2023-01-26 ENCOUNTER — TRANSCRIBE ORDER (OUTPATIENT)
Dept: SCHEDULING | Age: 38
End: 2023-01-26

## 2023-01-26 DIAGNOSIS — I50.20 HEART FAILURE, SYSTOLIC (HCC): Primary | ICD-10-CM

## 2023-01-31 ENCOUNTER — HOSPITAL ENCOUNTER (OUTPATIENT)
Dept: NUCLEAR MEDICINE | Age: 38
Discharge: HOME | End: 2023-01-31
Attending: INTERNAL MEDICINE
Payer: COMMERCIAL

## 2023-01-31 DIAGNOSIS — I50.20 HEART FAILURE, SYSTOLIC (HCC): ICD-10-CM

## 2023-01-31 LAB — STRESS TARGET HR: 184 BPM

## 2023-01-31 PROCEDURE — 78472 GATED HEART PLANAR SINGLE: CPT

## (undated) DEVICE — TUBING HYDR IRR --

## (undated) DEVICE — FORCEPS BX L240CM JAW DIA2.8MM L CAP W/ NDL MIC MESH TOOTH